# Patient Record
Sex: MALE | Race: WHITE | Employment: OTHER | URBAN - METROPOLITAN AREA
[De-identification: names, ages, dates, MRNs, and addresses within clinical notes are randomized per-mention and may not be internally consistent; named-entity substitution may affect disease eponyms.]

---

## 2019-07-14 ENCOUNTER — HOSPITAL ENCOUNTER (OUTPATIENT)
Dept: MRI IMAGING | Age: 74
Discharge: HOME | End: 2019-07-14
Attending: SPECIALIST
Payer: MEDICARE

## 2019-07-14 DIAGNOSIS — M54.9 BACK PAIN: ICD-10-CM

## 2019-07-14 PROCEDURE — 72148 MRI LUMBAR SPINE W/O DYE: CPT

## 2019-11-04 NOTE — PROGRESS NOTES
Neurology Note    Patient ID:  Claudetta Few  <[id number]>  75 y.o.  1945      Date of Consultation:  November 5, 2019    Referring Physician: Tamra Burns NP    Reason for Consultation:  Leg stiffness    Subjective: my legs are stiff. History of Present Illness:   Claudetta Few is a 75 y.o. male who presents for an evaluation of balance difficulties and leg stiffness. He states that he has had these difficulties for well over a year now. He states he has been losing his flexibility and mobility in his legs. He began to seek additional attention approximately 2 months ago. He did see his primary care doctor and then did have a referral to a neurosurgeon. He did have imaging of his spine which did reportedly show lumbar degeneration. He then did have 2 injections into his spine which provided very little in the way of improvement. Of note, he does have 2 separate charts with in the [facility] system. He is listed under Sulema Buchanan in 1 and GWENDOLYN Mckay in today's note. He was then arranged to receive physical therapy twice a week. He has been there for 2 weeks. No real benefit. He has fallen in the past, about 2 months ago was in last fall. He doesn't use a cane or hiking stick. He does feels that he is slower. Turning makes his balance notably worse. He does have shower bars now. He did have one fall in the shower a few years back. At times, he does feel that his legs are heavy and that his feet do not have good contact with the ground. He states there is a slight bit of numbness but no true pain. He does not notice any tingling either. In regards to his overall medical health, he did break out with a rash in June/July after having a reaction to a medication and sunlight. Now he is getting treatment for skin cancer. This is continuing to improve.     Past Medical History:   Diagnosis Date    Arthritis     Essential hypertension         Past Surgical History:   Procedure Laterality Date    HX PROSTATE SURGERY          Family History   Problem Relation Age of Onset    Seizures Mother     Stroke Mother         Social History     Tobacco Use    Smoking status: Never Smoker    Smokeless tobacco: Never Used   Substance Use Topics    Alcohol use: Yes        Allergies not on file     Prior to Admission medications    Medication Sig Start Date End Date Taking? Authorizing Provider   carvedilol (COREG) 25 mg tablet Take 25 mg by mouth two (2) times daily (with meals). Yes Provider, Historical   omega 3-dha-epa-fish oil (FISH OIL) 100-160-1,000 mg cap Take  by mouth daily. Yes Provider, Historical   multivitamin (ONE A DAY) tablet Take 1 Tab by mouth daily. Yes Provider, Historical     On celebrex to help with arthritis. Review of Systems:    General, constitutional: negative  Eyes, vision: negative  Ears, nose, throat: negative  Cardiovascular, heart: negative  Respiratory: negative  Gastrointestinal: negative  Genitourinary: negative  Musculoskeletal: Back and joint pain  Skin and integumentary: Multiple skin bruises  Psychiatric: negative  Endocrine: negative  Neurological: negative, except for HPI  Hematologic/lymphatic: negative  Allergy/immunology: negative      Objective:     Visit Vitals  /70   Pulse 69   Ht 5' 11\" (1.803 m)   Wt 183 lb (83 kg)   SpO2 98%   BMI 25.52 kg/m²       Physical Exam:      General:  appears well nourished in no acute distress  Neck: no carotid bruits  Lungs: clear to auscultation  Heart:  no murmurs, regular rate  Lower extremity: peripheral pulses palpable and no edema. He does have a notable bruise on his left dorsum of the foot  Skin: intact, ever there is multiple skin lesions    Neurological exam:    Awake, alert, oriented to person, place and time  Recent and remote memory were normal  Attention and concentration were intact  Language was intact.   There was no aphasia  Speech: no dysarthria  Fund of knowledge was preserved    Cranial nerves:   II-XII were tested    Perrrla  Fundoscopic examination revealed venous pulsations and no clear abnormalities  Visual fields were full  Eomi, no evidence of nystagmus  Facial sensation:  normal and symmetric  Facial motor: normal and symmetric  Hearing is diminished  SCM strength intact  Tongue: midline without fasciculations    Motor: Tone normal    No evidence of fasciculations    Strength testing:   deltoid triceps biceps Wrist ext. Wrist flex. intrinsics Hip flex. Hip ext. Knee ext. Knee flex Dorsi flex Plantar flex   Right 5 5 5 5 5 5 5 5 5 5 5 5   Left 5 5 5 5 5 5 5 5 5 5 5 5         Sensory:  Upper extremity: intact to pp, light touch, and vibration > 10 seconds  Lower extremity: His vibration is absent at his toes and present for only 2 seconds at his ankles. It is present for 6 seconds and his knees. His pinprick is decreased to just above his ankles    Reflexes:    Right Left  Biceps  2 2  Triceps 2 2  Brachiorad. 2 2  Patella  3 3  Achilles 1 1    Plantar response:  flexor bilaterally      Cerebellar testing:  no tremor apparent, finger/nose and frank were intact    Romberg: Present    Gait: steady however wide-based. He is unable to tandem gait. He also could not walk on his toes or heels. Labs:     I do not have any laboratory results for my review. Imaging:    I did review his lumbar spine MRI which is listed under [facility]. This was performed in July 2019. There is multilevel degenerative disease that was apparent. I did also review the final interpretation of the radiologist      Assessment and Plan: This is a pleasant 25-year-old gentleman with multiple problems including lumbar degeneration which is impacting his overall neurological health who presents with difficulty with balance and leg stiffness. 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   Interestingly, he does have brisk patellar reflexes which give a suggestion of a possible myeloneuropathy. Balance difficulties: There is clearly some element of a neuropathy, which may be a myeloneuropathy. .  This may be due to combined neurological issues of a neuropathy as well as a  cervical or thoracic cord impingement. I discussed this with the patient today. I would like to obtain serological testing looking for causes of neuropathy. I discussed the rationale with him today and we will obtain his labs  I would like him to obtain an EMG nerve conduction study to help determine whether this is demyelinating versus axonal.  He should continue with physical therapy. I do think he should get a hiking stick to help with his balance as I do fear for falls with him. Neuropathy:  we reviewed the causes contributing to the neuropathy. We discussed the importance of exercise and activity. I also reviewed the importance of safety with ambulation and ways to prevents falls. I did provide him with educational information about peripheral neuropathy. 2. Lumbar spine disease:    He should continue with his stretches and exercises as well as physical therapy. He will return to clinic in 1 month's time to review all of the serological testing as well as the EMG nerve conduction study results.                Signed By:  Jane Barron DO FAAN    November 5, 2019

## 2019-11-05 ENCOUNTER — OFFICE VISIT (OUTPATIENT)
Dept: NEUROLOGY | Age: 74
End: 2019-11-05

## 2019-11-05 VITALS
HEART RATE: 69 BPM | DIASTOLIC BLOOD PRESSURE: 70 MMHG | BODY MASS INDEX: 25.62 KG/M2 | WEIGHT: 183 LBS | OXYGEN SATURATION: 98 % | HEIGHT: 71 IN | SYSTOLIC BLOOD PRESSURE: 140 MMHG

## 2019-11-05 DIAGNOSIS — M62.9 DISORDER OF MUSCLE, UNSPECIFIED: ICD-10-CM

## 2019-11-05 DIAGNOSIS — R73.9 HYPERGLYCEMIA, UNSPECIFIED: ICD-10-CM

## 2019-11-05 DIAGNOSIS — R68.89 OTHER GENERAL SYMPTOMS AND SIGNS: ICD-10-CM

## 2019-11-05 DIAGNOSIS — G62.9 NEUROPATHY: Primary | ICD-10-CM

## 2019-11-05 RX ORDER — CARVEDILOL 25 MG/1
25 TABLET ORAL 2 TIMES DAILY WITH MEALS
COMMUNITY
End: 2020-01-07 | Stop reason: SDUPTHER

## 2019-11-05 RX ORDER — BISMUTH SUBSALICYLATE 262 MG
1 TABLET,CHEWABLE ORAL DAILY
COMMUNITY
End: 2020-01-07 | Stop reason: SDUPTHER

## 2019-11-05 NOTE — PATIENT INSTRUCTIONS

## 2019-11-06 LAB
ALBUMIN SERPL ELPH-MCNC: NORMAL G/DL
ALBUMIN SERPL ELPH-MCNC: NORMAL G/DL
ALBUMIN SERPL-MCNC: 4.1 G/DL (ref 3.5–4.8)
ALBUMIN SERPL-MCNC: 4.1 G/DL (ref 3.5–4.8)
ALBUMIN/GLOB SERPL: 1.6 {RATIO} (ref 1.2–2.2)
ALBUMIN/GLOB SERPL: 1.6 {RATIO} (ref 1.2–2.2)
ALBUMIN/GLOB SERPL: NORMAL {RATIO}
ALBUMIN/GLOB SERPL: NORMAL {RATIO}
ALP SERPL-CCNC: 66 IU/L (ref 39–117)
ALP SERPL-CCNC: 66 IU/L (ref 39–117)
ALPHA1 GLOB SERPL ELPH-MCNC: NORMAL G/DL
ALPHA1 GLOB SERPL ELPH-MCNC: NORMAL G/DL
ALPHA2 GLOB SERPL ELPH-MCNC: NORMAL G/DL
ALPHA2 GLOB SERPL ELPH-MCNC: NORMAL G/DL
ALT SERPL-CCNC: 15 IU/L (ref 0–44)
ALT SERPL-CCNC: 15 IU/L (ref 0–44)
AST SERPL-CCNC: 25 IU/L (ref 0–40)
AST SERPL-CCNC: 25 IU/L (ref 0–40)
B-GLOBULIN SERPL ELPH-MCNC: NORMAL G/DL
B-GLOBULIN SERPL ELPH-MCNC: NORMAL G/DL
BASOPHILS # BLD AUTO: 0.1 X10E3/UL (ref 0–0.2)
BASOPHILS # BLD AUTO: NORMAL 10*3/UL
BASOPHILS NFR BLD AUTO: 1 %
BASOPHILS NFR BLD AUTO: NORMAL %
BILIRUB SERPL-MCNC: 0.4 MG/DL (ref 0–1.2)
BILIRUB SERPL-MCNC: 0.4 MG/DL (ref 0–1.2)
BUN SERPL-MCNC: 12 MG/DL (ref 8–27)
BUN SERPL-MCNC: 12 MG/DL (ref 8–27)
BUN/CREAT SERPL: 12 (ref 10–24)
BUN/CREAT SERPL: 12 (ref 10–24)
CALCIUM SERPL-MCNC: 9.1 MG/DL (ref 8.6–10.2)
CALCIUM SERPL-MCNC: 9.1 MG/DL (ref 8.6–10.2)
CENTROMERE B AB SER-ACNC: <0.2 AI (ref 0–0.9)
CENTROMERE B AB SER-ACNC: NORMAL
CHLORIDE SERPL-SCNC: 97 MMOL/L (ref 96–106)
CHLORIDE SERPL-SCNC: 97 MMOL/L (ref 96–106)
CHROMATIN AB SERPL-ACNC: <0.2 AI (ref 0–0.9)
CHROMATIN AB SERPL-ACNC: NORMAL
CO2 SERPL-SCNC: 21 MMOL/L (ref 20–29)
CO2 SERPL-SCNC: 21 MMOL/L (ref 20–29)
CREAT SERPL-MCNC: 0.98 MG/DL (ref 0.76–1.27)
CREAT SERPL-MCNC: 0.98 MG/DL (ref 0.76–1.27)
CRP SERPL HS-MCNC: 4.56 MG/L (ref 0–3)
CRP SERPL HS-MCNC: 4.56 MG/L (ref 0–3)
DSDNA AB SER-ACNC: <1 IU/ML (ref 0–9)
DSDNA AB SER-ACNC: NORMAL [IU]/ML
ENA JO1 AB SER-ACNC: <0.2 AI (ref 0–0.9)
ENA JO1 AB SER-ACNC: NORMAL
ENA RNP AB SER-ACNC: <0.2 AI (ref 0–0.9)
ENA RNP AB SER-ACNC: NORMAL
ENA SCL70 AB SER-ACNC: <0.2 AI (ref 0–0.9)
ENA SCL70 AB SER-ACNC: NORMAL
ENA SM AB SER-ACNC: <0.2 AI (ref 0–0.9)
ENA SM AB SER-ACNC: NORMAL
ENA SM+RNP AB SER-ACNC: <0.2 AI (ref 0–0.9)
ENA SM+RNP AB SER-ACNC: NORMAL
ENA SS-A AB SER-ACNC: <0.2 AI (ref 0–0.9)
ENA SS-A AB SER-ACNC: NORMAL
ENA SS-B AB SER-ACNC: <0.2 AI (ref 0–0.9)
ENA SS-B AB SER-ACNC: NORMAL
EOSINOPHIL # BLD AUTO: 0.6 X10E3/UL (ref 0–0.4)
EOSINOPHIL # BLD AUTO: NORMAL 10*3/UL
EOSINOPHIL NFR BLD AUTO: 7 %
EOSINOPHIL NFR BLD AUTO: NORMAL %
ERYTHROCYTE [DISTWIDTH] IN BLOOD BY AUTOMATED COUNT: 15.1 % (ref 12.3–15.4)
ERYTHROCYTE [DISTWIDTH] IN BLOOD BY AUTOMATED COUNT: NORMAL %
GAMMA GLOB SERPL ELPH-MCNC: NORMAL G/DL
GAMMA GLOB SERPL ELPH-MCNC: NORMAL G/DL
GLOBULIN SER CALC-MCNC: 2.6 G/DL (ref 1.5–4.5)
GLOBULIN SER CALC-MCNC: 2.6 G/DL (ref 1.5–4.5)
GLOBULIN SER-MCNC: NORMAL G/DL
GLOBULIN SER-MCNC: NORMAL G/DL
GLUCOSE SERPL-MCNC: 92 MG/DL (ref 65–99)
GLUCOSE SERPL-MCNC: 92 MG/DL (ref 65–99)
HBA1C MFR BLD: 5.4 % (ref 4.8–5.6)
HBA1C MFR BLD: NORMAL %
HCT VFR BLD AUTO: 32.6 % (ref 37.5–51)
HCT VFR BLD AUTO: NORMAL %
HGB BLD-MCNC: 11.2 G/DL (ref 13–17.7)
HGB BLD-MCNC: NORMAL G/DL
IGA SERPL-MCNC: NORMAL MG/DL
IGA SERPL-MCNC: NORMAL MG/DL
IGG SERPL-MCNC: NORMAL MG/DL
IGG SERPL-MCNC: NORMAL MG/DL
IGM SERPL-MCNC: NORMAL MG/DL
IGM SERPL-MCNC: NORMAL MG/DL
IMM GRANULOCYTES # BLD AUTO: 0.3 X10E3/UL (ref 0–0.1)
IMM GRANULOCYTES # BLD AUTO: NORMAL 10*3/UL
IMM GRANULOCYTES NFR BLD AUTO: 3 %
IMM GRANULOCYTES NFR BLD AUTO: NORMAL %
IMMATURE CELLS, 115398: NORMAL
INTERPRETATION SERPL IEP-IMP: NORMAL
INTERPRETATION SERPL IEP-IMP: NORMAL
LABORATORY REPORT: NORMAL
LABORATORY REPORT: NORMAL
LYMPHOCYTES # BLD AUTO: 1.8 X10E3/UL (ref 0.7–3.1)
LYMPHOCYTES # BLD AUTO: NORMAL 10*3/UL
LYMPHOCYTES NFR BLD AUTO: 21 %
LYMPHOCYTES NFR BLD AUTO: NORMAL %
Lab: NORMAL
Lab: NORMAL
M PROTEIN SERPL ELPH-MCNC: NORMAL G/DL
M PROTEIN SERPL ELPH-MCNC: NORMAL G/DL
MCH RBC QN AUTO: 33.2 PG (ref 26.6–33)
MCH RBC QN AUTO: NORMAL PG
MCHC RBC AUTO-ENTMCNC: 34.4 G/DL (ref 31.5–35.7)
MCHC RBC AUTO-ENTMCNC: NORMAL G/DL
MCV RBC AUTO: 97 FL (ref 79–97)
MCV RBC AUTO: NORMAL FL
METHYLMALONATE SERPL-SCNC: NORMAL
METHYLMALONATE SERPL-SCNC: NORMAL
MONOCYTES # BLD AUTO: 1.2 X10E3/UL (ref 0.1–0.9)
MONOCYTES # BLD AUTO: NORMAL 10*3/UL
MONOCYTES NFR BLD AUTO: 13 %
MONOCYTES NFR BLD AUTO: NORMAL %
MORPHOLOGY BLD-IMP: NORMAL
NEUTROPHILS # BLD AUTO: 5 X10E3/UL (ref 1.4–7)
NEUTROPHILS # BLD AUTO: NORMAL 10*3/UL
NEUTROPHILS NFR BLD AUTO: 55 %
NEUTROPHILS NFR BLD AUTO: NORMAL %
NRBC BLD AUTO-RTO: NORMAL %
PLATELET # BLD AUTO: 161 X10E3/UL (ref 150–450)
PLATELET # BLD AUTO: NORMAL 10*3/UL
PLEASE NOTE:, 149534: NORMAL
PLEASE NOTE:, 149534: NORMAL
POTASSIUM SERPL-SCNC: 4.4 MMOL/L (ref 3.5–5.2)
POTASSIUM SERPL-SCNC: 4.4 MMOL/L (ref 3.5–5.2)
PROT SERPL-MCNC: 6.7 G/DL (ref 6–8.5)
PROT SERPL-MCNC: 6.7 G/DL (ref 6–8.5)
RBC # BLD AUTO: 3.37 X10E6/UL (ref 4.14–5.8)
RBC # BLD AUTO: NORMAL 10*6/UL
RIBOSOMAL P AB SER-ACNC: <0.2 AI (ref 0–0.9)
RIBOSOMAL P AB SER-ACNC: NORMAL
SEE BELOW:, 164879: NORMAL
SEE BELOW:, 164879: NORMAL
SODIUM SERPL-SCNC: 131 MMOL/L (ref 134–144)
SODIUM SERPL-SCNC: 131 MMOL/L (ref 134–144)
T4 FREE SERPL-MCNC: 0.85 NG/DL (ref 0.82–1.77)
T4 FREE SERPL-MCNC: 0.85 NG/DL (ref 0.82–1.77)
TSH SERPL DL<=0.005 MIU/L-ACNC: 5.42 UIU/ML (ref 0.45–4.5)
TSH SERPL DL<=0.005 MIU/L-ACNC: 5.42 UIU/ML (ref 0.45–4.5)
VIT B12 SERPL-MCNC: 877 PG/ML (ref 232–1245)
VIT B12 SERPL-MCNC: 877 PG/ML (ref 232–1245)
WBC # BLD AUTO: 8.9 X10E3/UL (ref 3.4–10.8)
WBC # BLD AUTO: NORMAL 10*3/UL

## 2019-11-08 ENCOUNTER — OFFICE VISIT (OUTPATIENT)
Dept: NEUROLOGY | Age: 74
End: 2019-11-08

## 2019-11-08 VITALS
WEIGHT: 183 LBS | BODY MASS INDEX: 25.62 KG/M2 | HEART RATE: 68 BPM | SYSTOLIC BLOOD PRESSURE: 145 MMHG | HEIGHT: 71 IN | OXYGEN SATURATION: 98 % | DIASTOLIC BLOOD PRESSURE: 62 MMHG

## 2019-11-08 DIAGNOSIS — G60.9 IDIOPATHIC PERIPHERAL NEUROPATHY: ICD-10-CM

## 2019-11-08 LAB
ALBUMIN SERPL ELPH-MCNC: NORMAL G/DL
ALBUMIN SERPL-MCNC: 4.1 G/DL (ref 3.5–4.8)
ALBUMIN/GLOB SERPL: 1.6 {RATIO} (ref 1.2–2.2)
ALBUMIN/GLOB SERPL: NORMAL {RATIO}
ALP SERPL-CCNC: 66 IU/L (ref 39–117)
ALPHA1 GLOB SERPL ELPH-MCNC: NORMAL G/DL
ALPHA2 GLOB SERPL ELPH-MCNC: NORMAL G/DL
ALT SERPL-CCNC: 15 IU/L (ref 0–44)
AST SERPL-CCNC: 25 IU/L (ref 0–40)
B-GLOBULIN SERPL ELPH-MCNC: NORMAL G/DL
BASOPHILS # BLD AUTO: 0.1 X10E3/UL (ref 0–0.2)
BASOPHILS NFR BLD AUTO: 1 %
BILIRUB SERPL-MCNC: 0.4 MG/DL (ref 0–1.2)
BUN SERPL-MCNC: 12 MG/DL (ref 8–27)
BUN/CREAT SERPL: 12 (ref 10–24)
CALCIUM SERPL-MCNC: 9.1 MG/DL (ref 8.6–10.2)
CENTROMERE B AB SER-ACNC: <0.2 AI (ref 0–0.9)
CHLORIDE SERPL-SCNC: 97 MMOL/L (ref 96–106)
CHROMATIN AB SERPL-ACNC: <0.2 AI (ref 0–0.9)
CO2 SERPL-SCNC: 21 MMOL/L (ref 20–29)
CREAT SERPL-MCNC: 0.98 MG/DL (ref 0.76–1.27)
CRP SERPL HS-MCNC: 4.56 MG/L (ref 0–3)
DSDNA AB SER-ACNC: <1 IU/ML (ref 0–9)
ENA JO1 AB SER-ACNC: <0.2 AI (ref 0–0.9)
ENA RNP AB SER-ACNC: <0.2 AI (ref 0–0.9)
ENA SCL70 AB SER-ACNC: <0.2 AI (ref 0–0.9)
ENA SM AB SER-ACNC: <0.2 AI (ref 0–0.9)
ENA SM+RNP AB SER-ACNC: <0.2 AI (ref 0–0.9)
ENA SS-A AB SER-ACNC: <0.2 AI (ref 0–0.9)
ENA SS-B AB SER-ACNC: <0.2 AI (ref 0–0.9)
EOSINOPHIL # BLD AUTO: 0.6 X10E3/UL (ref 0–0.4)
EOSINOPHIL NFR BLD AUTO: 7 %
ERYTHROCYTE [DISTWIDTH] IN BLOOD BY AUTOMATED COUNT: 15.1 % (ref 12.3–15.4)
GAMMA GLOB SERPL ELPH-MCNC: NORMAL G/DL
GLOBULIN SER CALC-MCNC: 2.6 G/DL (ref 1.5–4.5)
GLOBULIN SER-MCNC: NORMAL G/DL
GLUCOSE SERPL-MCNC: 92 MG/DL (ref 65–99)
HBA1C MFR BLD: 5.4 % (ref 4.8–5.6)
HCT VFR BLD AUTO: 32.6 % (ref 37.5–51)
HGB BLD-MCNC: 11.2 G/DL (ref 13–17.7)
IGA SERPL-MCNC: NORMAL MG/DL
IGG SERPL-MCNC: NORMAL MG/DL
IGM SERPL-MCNC: NORMAL MG/DL
IMM GRANULOCYTES # BLD AUTO: 0.3 X10E3/UL (ref 0–0.1)
IMM GRANULOCYTES NFR BLD AUTO: 3 %
INTERPRETATION SERPL IEP-IMP: NORMAL
LABORATORY REPORT: NORMAL
LYMPHOCYTES # BLD AUTO: 1.8 X10E3/UL (ref 0.7–3.1)
LYMPHOCYTES NFR BLD AUTO: 21 %
Lab: NORMAL
M PROTEIN SERPL ELPH-MCNC: NORMAL G/DL
MCH RBC QN AUTO: 33.2 PG (ref 26.6–33)
MCHC RBC AUTO-ENTMCNC: 34.4 G/DL (ref 31.5–35.7)
MCV RBC AUTO: 97 FL (ref 79–97)
METHYLMALONATE SERPL-SCNC: 205 NMOL/L (ref 0–378)
MONOCYTES # BLD AUTO: 1.2 X10E3/UL (ref 0.1–0.9)
MONOCYTES NFR BLD AUTO: 13 %
NEUTROPHILS # BLD AUTO: 5 X10E3/UL (ref 1.4–7)
NEUTROPHILS NFR BLD AUTO: 55 %
PLATELET # BLD AUTO: 161 X10E3/UL (ref 150–450)
PLEASE NOTE:, 149534: NORMAL
POTASSIUM SERPL-SCNC: 4.4 MMOL/L (ref 3.5–5.2)
PROT SERPL-MCNC: 6.7 G/DL (ref 6–8.5)
RBC # BLD AUTO: 3.37 X10E6/UL (ref 4.14–5.8)
RIBOSOMAL P AB SER-ACNC: <0.2 AI (ref 0–0.9)
SEE BELOW:, 164879: NORMAL
SODIUM SERPL-SCNC: 131 MMOL/L (ref 134–144)
T4 FREE SERPL-MCNC: 0.85 NG/DL (ref 0.82–1.77)
TSH SERPL DL<=0.005 MIU/L-ACNC: 5.42 UIU/ML (ref 0.45–4.5)
VIT B12 SERPL-MCNC: 877 PG/ML (ref 232–1245)
WBC # BLD AUTO: 8.9 X10E3/UL (ref 3.4–10.8)

## 2019-11-08 NOTE — PROCEDURES
ELECTRODIAGNOSTIC REPORT      Test Date:  2019    Patient: Marie Torres : 1945 Physician: Dr. Kojo Presley D.O.   ID#: [id number] SEX: Male Ref. Phys:      Patient History / Exam:  Please see the neuromuscular consult    EMG & NCV Finding    Nerve conduction studies as listed below were absent for the bilateral superficial peroneal sensory and sural sensory. The right median sensory, radial sensory were normal.  The left peroneal motor was normal.  The right peroneal motor showed only a very mild reduction in conduction velocity with low normal amplitude. The right median motor and ulnar motor studies were normal.  The bilateral tibial motor responses were low normal.    Disposable concentric needle examination of the left lower extremity was essentially normal.    Impression: This study was abnormal.  There is electrodiagnostic evidence upon today's examination suggestin. A sensory greater than motor length dependent bilateral peripheral neuropathy. 2.  There was no evidence of a lower extremity radiculopathy. _____________  Cheryl VILLALPANDO FAAN    Nerve Conduction Studies  Anti Sensory Summary Table     Stim Site NR Onset (ms) Peak (ms) O-P Amp (µV) Norm Peak (ms) Norm O-P Amp Site1 Site2 Dist (cm) Norm Suman (m/s)   Right Median Anti Sensory (2nd Digit)  31.9°C   Wrist    2.9 3.9 22.3 <4 >11 Wrist 2nd Digit 14.0    Right Radial Anti Sensory (Base 1st Digit)  32.2°C   Wrist    2.0 2.4 31.2 <2.8 7 Wrist Base 1st Digit 10.0    Left Sup Fibular Anti Sensory (Lat ankle)  32.3°C   Lower leg NR    <4.4 >5.0 Lower leg Lat ankle 10.0 >32   Right Sup Fibular Anti Sensory (Lat ankle)  31.1°C   Lower leg NR    <4.4 >5.0 Lower leg Lat ankle 10.0 >32   Left Sural Anti Sensory (Lat Mall)  32.3°C   Calf NR    <4.5 >4.0 Calf Lat Mall 14.0    Right Sural Anti Sensory (Lat Mall)  30.6°C   Calf NR    <4.5 >4.0 Calf Lat Mall 14.0    Right Ulnar Anti Sensory (5th Digit)  32.1°C   Wrist    3.3 4.0 22.4 <4.0 >10 Wrist 5th Digit 14.0      Motor Summary Table     Stim Site NR Onset (ms) Norm Onset (ms) O-P Amp (mV) Norm O-P Amp P-T Amp (mV) Site1 Site2 Dist (cm) Suman (m/s)   Left Fibular Motor (Ext Dig Brev)  32.3°C   Ankle    5.2 <6.5 3.0 >1.1  Ankle Ext Dig Brev 8.0 15   B Fib    13.1  1.9   B Fib Ankle 32.0 41   Poplt    15.2  1.9   Poplt B Fib 10.0 48   Right Fibular Motor (Ext Dig Brev)   Ankle    4.7 <6.5 2.9 >1.1  Ankle Ext Dig Brev 8.0 17   B Fib    13.8  1.5   B Fib Ankle 33.0 36   Poplt    15.9  1.4   Poplt B Fib 11.0 52   Right Median Motor (Abd Poll Brev)  32.3°C   Wrist    3.6 <4.5 8.1 >4.1  Wrist Abd Poll Brev 8.0 22   Elbow    8.4  7.8   Elbow Wrist 27.0 56   Left Tibial Motor (Abd Rehman Brev)  32.1°C   Ankle    4.8 <6.1 2.7 >1.1  Ankle Abd Rehman Brev 8.0 17   Knee    15.0  2.1   Knee Ankle 40.0 39   Right Tibial Motor (Abd Rehman Brev)  31.3°C   Ankle    4.5 <6.1 3.0 >1.1  Ankle Abd Rehman Brev 8.0 18   Knee    15.1  2.3   Knee Ankle 41.0 39   Right Ulnar Motor (Abd Dig Minimi)  32.4°C   Wrist    3.1 <3.1 10.9 >7.0  Wrist Abd Dig Minimi 8.0 26   B Elbow    7.8  8.7   B Elbow Wrist 24.0 51   A Elbow    9.8  8.5   A Elbow B Elbow 10.0 50       EMG     Side Muscle Nerve Root Ins Act Fibs Psw Recrt Duration Amp Poly Comment                 Left AntTibialis Dp Br Peron L4-5 Nml Nml Nml Nml Nml Nml Nml    Left MedGastroc Tibial S1-2 Incr Nml Nml Nml Nml Nml Nml    Left Peroneus Long   Nml Nml Nml Nml Nml Nml Nml    Left VastusLat Femoral L2-4 Nml Nml Nml Nml Nml Nml Nml    Left Tensor Fascia Katherine Sciatic L5 Nml Nml Nml Nml Nml Nml Nml    Left GluteusMed SupGluteal L4-S1 Nml Nml Nml Nml Nml Nml Nml      Waveforms:

## 2019-11-11 LAB
ALBUMIN SERPL ELPH-MCNC: 3.9 G/DL (ref 2.9–4.4)
ALBUMIN SERPL-MCNC: 4.1 G/DL (ref 3.5–4.8)
ALBUMIN/GLOB SERPL: 1.4 {RATIO} (ref 0.7–1.7)
ALBUMIN/GLOB SERPL: 1.6 {RATIO} (ref 1.2–2.2)
ALP SERPL-CCNC: 66 IU/L (ref 39–117)
ALPHA1 GLOB SERPL ELPH-MCNC: 0.2 G/DL (ref 0–0.4)
ALPHA2 GLOB SERPL ELPH-MCNC: 0.5 G/DL (ref 0.4–1)
ALT SERPL-CCNC: 15 IU/L (ref 0–44)
AST SERPL-CCNC: 25 IU/L (ref 0–40)
B-GLOBULIN SERPL ELPH-MCNC: 0.8 G/DL (ref 0.7–1.3)
BASOPHILS # BLD AUTO: 0.1 X10E3/UL (ref 0–0.2)
BASOPHILS NFR BLD AUTO: 1 %
BILIRUB SERPL-MCNC: 0.4 MG/DL (ref 0–1.2)
BUN SERPL-MCNC: 12 MG/DL (ref 8–27)
BUN/CREAT SERPL: 12 (ref 10–24)
CALCIUM SERPL-MCNC: 9.1 MG/DL (ref 8.6–10.2)
CENTROMERE B AB SER-ACNC: <0.2 AI (ref 0–0.9)
CHLORIDE SERPL-SCNC: 97 MMOL/L (ref 96–106)
CHROMATIN AB SERPL-ACNC: <0.2 AI (ref 0–0.9)
CO2 SERPL-SCNC: 21 MMOL/L (ref 20–29)
CREAT SERPL-MCNC: 0.98 MG/DL (ref 0.76–1.27)
CRP SERPL HS-MCNC: 4.56 MG/L (ref 0–3)
DSDNA AB SER-ACNC: <1 IU/ML (ref 0–9)
ENA JO1 AB SER-ACNC: <0.2 AI (ref 0–0.9)
ENA RNP AB SER-ACNC: <0.2 AI (ref 0–0.9)
ENA SCL70 AB SER-ACNC: <0.2 AI (ref 0–0.9)
ENA SM AB SER-ACNC: <0.2 AI (ref 0–0.9)
ENA SM+RNP AB SER-ACNC: <0.2 AI (ref 0–0.9)
ENA SS-A AB SER-ACNC: <0.2 AI (ref 0–0.9)
ENA SS-B AB SER-ACNC: <0.2 AI (ref 0–0.9)
EOSINOPHIL # BLD AUTO: 0.6 X10E3/UL (ref 0–0.4)
EOSINOPHIL NFR BLD AUTO: 7 %
ERYTHROCYTE [DISTWIDTH] IN BLOOD BY AUTOMATED COUNT: 15.1 % (ref 12.3–15.4)
GAMMA GLOB SERPL ELPH-MCNC: 1.2 G/DL (ref 0.4–1.8)
GLOBULIN SER CALC-MCNC: 2.6 G/DL (ref 1.5–4.5)
GLOBULIN SER-MCNC: 2.8 G/DL (ref 2.2–3.9)
GLUCOSE SERPL-MCNC: 92 MG/DL (ref 65–99)
HBA1C MFR BLD: 5.4 % (ref 4.8–5.6)
HCT VFR BLD AUTO: 32.6 % (ref 37.5–51)
HGB BLD-MCNC: 11.2 G/DL (ref 13–17.7)
IGA SERPL-MCNC: 133 MG/DL (ref 61–437)
IGG SERPL-MCNC: 1322 MG/DL (ref 700–1600)
IGM SERPL-MCNC: 76 MG/DL (ref 15–143)
IMM GRANULOCYTES # BLD AUTO: 0.3 X10E3/UL (ref 0–0.1)
IMM GRANULOCYTES NFR BLD AUTO: 3 %
INTERPRETATION SERPL IEP-IMP: NORMAL
LYMPHOCYTES # BLD AUTO: 1.8 X10E3/UL (ref 0.7–3.1)
LYMPHOCYTES NFR BLD AUTO: 21 %
Lab: NORMAL
M PROTEIN SERPL ELPH-MCNC: NORMAL G/DL
MCH RBC QN AUTO: 33.2 PG (ref 26.6–33)
MCHC RBC AUTO-ENTMCNC: 34.4 G/DL (ref 31.5–35.7)
MCV RBC AUTO: 97 FL (ref 79–97)
METHYLMALONATE SERPL-SCNC: 205 NMOL/L (ref 0–378)
MONOCYTES # BLD AUTO: 1.2 X10E3/UL (ref 0.1–0.9)
MONOCYTES NFR BLD AUTO: 13 %
NEUTROPHILS # BLD AUTO: 5 X10E3/UL (ref 1.4–7)
NEUTROPHILS NFR BLD AUTO: 55 %
PLATELET # BLD AUTO: 161 X10E3/UL (ref 150–450)
PLEASE NOTE:, 149534: NORMAL
POTASSIUM SERPL-SCNC: 4.4 MMOL/L (ref 3.5–5.2)
PROT SERPL-MCNC: 6.7 G/DL (ref 6–8.5)
RBC # BLD AUTO: 3.37 X10E6/UL (ref 4.14–5.8)
RIBOSOMAL P AB SER-ACNC: <0.2 AI (ref 0–0.9)
SEE BELOW:, 164879: NORMAL
SODIUM SERPL-SCNC: 131 MMOL/L (ref 134–144)
T4 FREE SERPL-MCNC: 0.85 NG/DL (ref 0.82–1.77)
TSH SERPL DL<=0.005 MIU/L-ACNC: 5.42 UIU/ML (ref 0.45–4.5)
VIT B12 SERPL-MCNC: 877 PG/ML (ref 232–1245)
WBC # BLD AUTO: 8.9 X10E3/UL (ref 3.4–10.8)

## 2019-11-15 ENCOUNTER — TELEPHONE (OUTPATIENT)
Dept: NEUROLOGY | Age: 74
End: 2019-11-15

## 2019-11-18 NOTE — TELEPHONE ENCOUNTER
Hi Mr Tayler Roman,    There was no significant lab abnormalities to explain your neuropathy. Your inflammation marker was slightly elevated, but did not determine a specific cause.     Sincerely,  Dr. Halle Woods

## 2019-12-24 ENCOUNTER — HOSPITAL ENCOUNTER (EMERGENCY)
Age: 74
Discharge: HOME | End: 2019-12-24
Attending: EMERGENCY MEDICINE
Payer: MEDICARE

## 2019-12-24 ENCOUNTER — APPOINTMENT (OUTPATIENT)
Dept: GENERAL RADIOLOGY | Age: 74
End: 2019-12-24
Attending: EMERGENCY MEDICINE
Payer: MEDICARE

## 2019-12-24 VITALS
RESPIRATION RATE: 11 BRPM | OXYGEN SATURATION: 98 % | HEART RATE: 68 BPM | DIASTOLIC BLOOD PRESSURE: 57 MMHG | WEIGHT: 180 LBS | HEIGHT: 71 IN | SYSTOLIC BLOOD PRESSURE: 136 MMHG | TEMPERATURE: 98.4 F | BODY MASS INDEX: 25.2 KG/M2

## 2019-12-24 DIAGNOSIS — R55 SYNCOPE AND COLLAPSE: Primary | ICD-10-CM

## 2019-12-24 LAB
ALBUMIN SERPL-MCNC: 3.3 G/DL (ref 3.5–5)
ALBUMIN/GLOB SERPL: 0.9 {RATIO} (ref 1.1–2.2)
ALP SERPL-CCNC: 98 U/L (ref 45–117)
ALT SERPL-CCNC: 46 U/L (ref 12–78)
ANION GAP SERPL CALC-SCNC: 6 MMOL/L (ref 5–15)
AST SERPL-CCNC: 45 U/L (ref 15–37)
BASOPHILS # BLD: 0.1 K/UL (ref 0–0.1)
BASOPHILS NFR BLD: 1 % (ref 0–1)
BILIRUB SERPL-MCNC: 0.5 MG/DL (ref 0.2–1)
BUN SERPL-MCNC: 13 MG/DL (ref 6–20)
BUN/CREAT SERPL: 14 (ref 12–20)
CALCIUM SERPL-MCNC: 8.1 MG/DL (ref 8.5–10.1)
CHLORIDE SERPL-SCNC: 102 MMOL/L (ref 97–108)
CO2 SERPL-SCNC: 25 MMOL/L (ref 21–32)
CREAT SERPL-MCNC: 0.93 MG/DL (ref 0.7–1.3)
DIFFERENTIAL METHOD BLD: ABNORMAL
EOSINOPHIL # BLD: 0.5 K/UL (ref 0–0.4)
EOSINOPHIL NFR BLD: 5 % (ref 0–7)
ERYTHROCYTE [DISTWIDTH] IN BLOOD BY AUTOMATED COUNT: 15.9 % (ref 11.5–14.5)
GLOBULIN SER CALC-MCNC: 3.7 G/DL (ref 2–4)
GLUCOSE SERPL-MCNC: 102 MG/DL (ref 65–100)
HCT VFR BLD AUTO: 32.7 % (ref 36.6–50.3)
HGB BLD-MCNC: 11.2 G/DL (ref 12.1–17)
IMM GRANULOCYTES # BLD AUTO: 0.1 K/UL (ref 0–0.04)
IMM GRANULOCYTES NFR BLD AUTO: 1 % (ref 0–0.5)
LYMPHOCYTES # BLD: 1.6 K/UL (ref 0.8–3.5)
LYMPHOCYTES NFR BLD: 15 % (ref 12–49)
MCH RBC QN AUTO: 33.1 PG (ref 26–34)
MCHC RBC AUTO-ENTMCNC: 34.3 G/DL (ref 30–36.5)
MCV RBC AUTO: 96.7 FL (ref 80–99)
MONOCYTES # BLD: 1.2 K/UL (ref 0–1)
MONOCYTES NFR BLD: 11 % (ref 5–13)
NEUTS SEG # BLD: 7.3 K/UL (ref 1.8–8)
NEUTS SEG NFR BLD: 67 % (ref 32–75)
NRBC # BLD: 0 K/UL (ref 0–0.01)
NRBC BLD-RTO: 0 PER 100 WBC
PLATELET # BLD AUTO: 123 K/UL (ref 150–400)
PMV BLD AUTO: 9.3 FL (ref 8.9–12.9)
POTASSIUM SERPL-SCNC: 4.1 MMOL/L (ref 3.5–5.1)
PROT SERPL-MCNC: 7 G/DL (ref 6.4–8.2)
RBC # BLD AUTO: 3.38 M/UL (ref 4.1–5.7)
SODIUM SERPL-SCNC: 133 MMOL/L (ref 136–145)
TROPONIN I SERPL-MCNC: <0.05 NG/ML
WBC # BLD AUTO: 10.7 K/UL (ref 4.1–11.1)

## 2019-12-24 PROCEDURE — 71045 X-RAY EXAM CHEST 1 VIEW: CPT

## 2019-12-24 PROCEDURE — 99285 EMERGENCY DEPT VISIT HI MDM: CPT

## 2019-12-24 PROCEDURE — 74011250636 HC RX REV CODE- 250/636: Performed by: EMERGENCY MEDICINE

## 2019-12-24 PROCEDURE — 84484 ASSAY OF TROPONIN QUANT: CPT

## 2019-12-24 PROCEDURE — 93005 ELECTROCARDIOGRAM TRACING: CPT

## 2019-12-24 PROCEDURE — 80053 COMPREHEN METABOLIC PANEL: CPT

## 2019-12-24 PROCEDURE — 36415 COLL VENOUS BLD VENIPUNCTURE: CPT

## 2019-12-24 PROCEDURE — 85025 COMPLETE CBC W/AUTO DIFF WBC: CPT

## 2019-12-24 RX ADMIN — SODIUM CHLORIDE 500 ML: 900 INJECTION, SOLUTION INTRAVENOUS at 13:05

## 2019-12-24 NOTE — ED TRIAGE NOTES
Pt was at home at table with friend when per EMS friend stated pt went unresponsive and friend assisted pt to floor. Pt did not hit head or fall. Per friend pt was out for about a minute and then came back around but was lethargic. BS per .  Pt AOx4   Pt states last night he had a sore throat and nasal congestion and took an [place] to help him sleep

## 2019-12-24 NOTE — DISCHARGE INSTRUCTIONS
Patient Education        Fainting: Care Instructions  Your Care Instructions    When you faint, or pass out, you lose consciousness for a short time. A brief drop in blood flow to the brain often causes it. When you fall or lie down, more blood flows to your brain and you regain consciousness. Emotional stress, pain, or overheating--especially if you have been standing--can make you faint. In these cases, fainting is usually not serious. But fainting can be a sign of a more serious problem. Your doctor may want you to have more tests to rule out other causes. The treatment you need depends on the reason why you fainted. The doctor has checked you carefully, but problems can develop later. If you notice any problems or new symptoms, get medical treatment right away. Follow-up care is a key part of your treatment and safety. Be sure to make and go to all appointments, and call your doctor if you are having problems. It's also a good idea to know your test results and keep a list of the medicines you take. How can you care for yourself at home? · Drink plenty of fluids to prevent dehydration. If you have kidney, heart, or liver disease and have to limit fluids, talk with your doctor before you increase your fluid intake. When should you call for help? Call 911 anytime you think you may need emergency care. For example, call if:    · You have symptoms of a heart problem. These may include:  ? Chest pain or pressure. ? Severe trouble breathing. ? A fast or irregular heartbeat. ? Lightheadedness or sudden weakness. ? Coughing up pink, foamy mucus. ? Passing out. After you call 911, the  may tell you to chew 1 adult-strength or 2 to 4 low-dose aspirin. Wait for an ambulance. Do not try to drive yourself.     · You have symptoms of a stroke. These may include:  ? Sudden numbness, tingling, weakness, or loss of movement in your face, arm, or leg, especially on only one side of your body.   ? Sudden vision changes. ? Sudden trouble speaking. ? Sudden confusion or trouble understanding simple statements. ? Sudden problems with walking or balance. ? A sudden, severe headache that is different from past headaches.     · You passed out (lost consciousness) again.    Watch closely for changes in your health, and be sure to contact your doctor if:    · You do not get better as expected. Where can you learn more? Go to http://roxane-jennifer.info/. Enter D989 in the search box to learn more about \"Fainting: Care Instructions. \"  Current as of: June 26, 2019  Content Version: 12.2  © 0792-9167 "[employer]", [employer]. Care instructions adapted under license by [employer] (which disclaims liability or warranty for this information). If you have questions about a medical condition or this instruction, always ask your healthcare professional. [address] any warranty or liability for your use of this information.

## 2019-12-24 NOTE — ED PROVIDER NOTES
EMERGENCY DEPARTMENT HISTORY AND PHYSICAL EXAM      Date: 12/24/2019  Patient Name: Nanda Silveira. Patient Age and Sex: 75 y.o. male     History of Presenting Illness     Chief Complaint   Patient presents with    Syncope     Per EMS pt had sycopal episode at home witnessed by friend around 1200pm today       History Provided By: Patient    HPI: Nanda Silveira. is a 28-year-old male presenting with syncope. Patient states that today he was sitting at the kitchen table talking with a friend when he started to get hot, sweaty, lightheaded and then passed out. His friend brought him slowly to the floor so he did not hit his head. Was out for a few minutes and was disoriented coming back. Feels better now just weak all over. Patient states that about 5 or 6 days ago he was dealing with diarrhea and had a syncopal episode when standing up from the kitchen table. Patient states he has been eating and drinking. Denies any cough, fevers, chest pain, shortness of breath, abdominal pain. There are no other complaints, changes, or physical findings at this time. PCP: Tiffani Gann MD    No current facility-administered medications on file prior to encounter. Current Outpatient Medications on File Prior to Encounter   Medication Sig Dispense Refill    carvedilol (COREG) 25 mg tablet Take 25 mg by mouth two (2) times daily (with meals).  omega 3-dha-epa-fish oil (FISH OIL) 100-160-1,000 mg cap Take  by mouth daily.  multivitamin (ONE A DAY) tablet Take 1 Tab by mouth daily.  HYDROcodone-acetaminophen (NORCO) 5-325 mg per tablet Take 1 Tab by mouth every four (4) hours as needed for Pain. Max Daily Amount: 6 Tabs. 20 Tab 0    bisacodyl (DULCOLAX) 5 mg EC tablet Take 2 Tabs by mouth daily as needed for Constipation. 30 Tab 1    multivitamins-minerals-lutein (CENTRUM SILVER) Tab Take  by mouth daily.         sodium chloride (OCEAN) 0.65 % nasal spray 1 Spray by Nasal route as needed.  carvedilol (COREG) 25 mg tablet Take 25 mg by mouth two (2) times daily (with meals).  simvastatin (ZOCOR) 20 mg tablet Take 20 mg by mouth nightly.  omega-3 fatty acids-vitamin e (FISH OIL) 1,000 mg Cap Take 2 Caps by mouth daily.  hydrochlorothiazide (HYDRODIURIL) 25 mg tablet Take 25 mg by mouth daily. Past History     Past Medical History:  Past Medical History:   Diagnosis Date    Arthritis     back,neck,shoulders    Arthritis     CAD (coronary artery disease)     per 10/2011 cardiology note    Cancer [facility]) Oct. 2011    prostate    Cardiomyopathy, alcoholic ([facility] [address]     per 48/5201 cardiology note    Essential hypertension     Hypercholesteremia     Hypertension     Other ill-defined conditions(799.89)     elevated cholesterol       Past Surgical History:  Past Surgical History:   Procedure Laterality Date    CARDIAC SURG PROCEDURE UNLIST      cardiac cath x2 normal,2003    COLONOSCOPY N/A 11/8/2016    COLON[facility]ita [facility]tt [facility]OSCOPY    HX HEENT      tonsillectomy    HX ORTHOPAEDIC      left ankle plate & screws    HX OTHER SURGICAL      colonoscopy    HX PROSTATE SURGERY      HX PROSTATECTOMY  10/19/11    ROBOTIC ASSISTED LAPAROSCOPIC PROSTATECTOMY WITH LEFT PELVIC LYMPH NODE DISSECTION    AR PROSTATE BIOPSY, NEEDLE, SATURATION SAMPLING      08/17/2011       Family History:  Family History   Problem Relation Age of Onset    Seizures Mother     Stroke Mother        Social History:  Social History     Tobacco Use    Smoking status: Never Smoker    Smokeless tobacco: Never Used   Substance Use Topics    Alcohol use: Yes    Drug use: Never       Allergies: Allergies   Allergen Reactions    Adhesive Itching     bandaide causes itching,irritates skin  (9/1/15 - patient states that bandaids only cause irritation if left on for long period.   Tested negative for latex allergy)         Review of Systems   Review of Systems   Constitutional: Positive for diaphoresis and fatigue. Negative for chills and fever. Respiratory: Negative for cough and shortness of breath. Cardiovascular: Negative for chest pain and leg swelling. Gastrointestinal: Positive for diarrhea. Negative for abdominal pain, constipation, nausea and vomiting. Genitourinary: Negative for dysuria and hematuria. Neurological: Positive for weakness (general). Negative for numbness. All other systems reviewed and are negative. Physical Exam   Physical Exam  Vitals signs and nursing note reviewed. Constitutional:       Appearance: He is well-developed. HENT:      Head: Normocephalic and atraumatic. Comments: Slightly dry mucous membranes  Eyes:      Conjunctiva/sclera: Conjunctivae normal.   Neck:      Musculoskeletal: Normal range of motion and neck supple. Cardiovascular:      Rate and Rhythm: Normal rate and regular rhythm. Pulmonary:      Effort: Pulmonary effort is normal. No respiratory distress. Breath sounds: Normal breath sounds. Abdominal:      General: There is no distension. Palpations: Abdomen is soft. Tenderness: There is no tenderness. Musculoskeletal: Normal range of motion. Skin:     General: Skin is warm and dry. Neurological:      Mental Status: He is alert and oriented to person, place, and time.           Diagnostic Study Results     Labs -     Recent Results (from the past 12 hour(s))   CBC WITH AUTOMATED DIFF    Collection Time: 12/24/19  1:01 PM   Result Value Ref Range    WBC 10.7 4.1 - 11.1 K/uL    RBC 3.38 (L) 4.10 - 5.70 M/uL    HGB 11.2 (L) 12.1 - 17.0 g/dL    HCT 32.7 (L) 36.6 - 50.3 %    MCV 96.7 80.0 - 99.0 FL    MCH 33.1 26.0 - 34.0 PG    MCHC 34.3 30.0 - 36.5 g/dL    RDW 15.9 (H) 11.5 - 14.5 %    PLATELET 704 (L) 669 - 400 K/uL    MPV 9.3 8.9 - 12.9 FL    NRBC 0.0 0  WBC    ABSOLUTE NRBC 0.00 0.00 - 0.01 K/uL    NEUTROPHILS 67 32 - 75 %    LYMPHOCYTES 15 12 - 49 % MONOCYTES 11 5 - 13 %    EOSINOPHILS 5 0 - 7 %    BASOPHILS 1 0 - 1 %    IMMATURE GRANULOCYTES 1 (H) 0.0 - 0.5 %    ABS. NEUTROPHILS 7.3 1.8 - 8.0 K/UL    ABS. LYMPHOCYTES 1.6 0.8 - 3.5 K/UL    ABS. MONOCYTES 1.2 (H) 0.0 - 1.0 K/UL    ABS. EOSINOPHILS 0.5 (H) 0.0 - 0.4 K/UL    ABS. BASOPHILS 0.1 0.0 - 0.1 K/UL    ABS. IMM. GRANS. 0.1 (H) 0.00 - 0.04 K/UL    DF AUTOMATED     METABOLIC PANEL, COMPREHENSIVE    Collection Time: 12/24/19  1:01 PM   Result Value Ref Range    Sodium 133 (L) 136 - 145 mmol/L    Potassium 4.1 3.5 - 5.1 mmol/L    Chloride 102 97 - 108 mmol/L    CO2 25 21 - 32 mmol/L    Anion gap 6 5 - 15 mmol/L    Glucose 102 (H) 65 - 100 mg/dL    BUN 13 6 - 20 MG/DL    Creatinine 0.93 0.70 - 1.30 MG/DL    BUN/Creatinine ratio 14 12 - 20      GFR est AA >60 >60 ml/min/1.73m2    GFR est non-AA >60 >60 ml/min/1.73m2    Calcium 8.1 (L) 8.5 - 10.1 MG/DL    Bilirubin, total 0.5 0.2 - 1.0 MG/DL    ALT (SGPT) 46 12 - 78 U/L    AST (SGOT) 45 (H) 15 - 37 U/L    Alk. phosphatase 98 45 - 117 U/L    Protein, total 7.0 6.4 - 8.2 g/dL    Albumin 3.3 (L) 3.5 - 5.0 g/dL    Globulin 3.7 2.0 - 4.0 g/dL    A-G Ratio 0.9 (L) 1.1 - 2.2     TROPONIN I    Collection Time: 12/24/19  1:01 PM   Result Value Ref Range    Troponin-I, Qt. <0.05 <0.05 ng/mL       Radiologic Studies -   XR CHEST PORT   Final Result   IMPRESSION: No acute findings. CT Results  (Last 48 hours)    None        CXR Results  (Last 48 hours)               12/24/19 1323  XR CHEST PORT Final result    Impression:  IMPRESSION: No acute findings. Narrative:  EXAM: XR CHEST PORT       INDICATION: syncope       COMPARISON: June 2012       FINDINGS: A portable AP radiograph of the chest was obtained at 1259 hours. The   patient is on a cardiac monitor. The lungs are clear. There is atherosclerosis   of the aorta. The bones and soft tissues are grossly within normal limits.                     Medical Decision Making   I am the first provider for this patient. I reviewed the vital signs, available nursing notes, past medical history, past surgical history, family history and social history. Vital Signs-Reviewed the patient's vital signs. Patient Vitals for the past 12 hrs:   Temp Pulse Resp BP SpO2   12/24/19 1257 -- 77 -- 140/62 --   12/24/19 1256 -- (!) 7 -- 140/64 --   12/24/19 1254 -- 73 -- 141/64 --   12/24/19 1250 -- -- -- -- 100 %   12/24/19 1246 98 °F (36.7 °C) 72 14 145/67 99 %       Records Reviewed: Nursing Notes and Old Medical Records    Provider Notes (Medical Decision Making):   Patient presenting with syncope in the setting of recent viral infection and diarrhea. Most likely orthostatic syncope versus dehydration. Will get labs, vitals, EKG and orthostatics. Less likely ACS, PE, but possibly electrolyte related. ED Course:   Initial assessment performed. The patients presenting problems have been discussed, and they are in agreement with the care plan formulated and outlined with them. I have encouraged them to ask questions as they arise throughout their visit. ED Course as of Dec 24 1408   Tue Dec 24, 2019   1314 EKG interpreted by me. Shows normal sinus rhythm with a rate of 70. Old left bundle branch block. No ST elevations depressions concerning for ischemia. [JS]      ED Course User Index  [JS] Ginette Landry MD     Critical Care Time:   0    Disposition:  Discharge Note:  The patient has been re-evaluated and is ready for discharge. Reviewed available results with patient. Counseled patient on diagnosis and care plan. Patient has expressed understanding, and all questions have been answered. Patient agrees with plan and agrees to follow up as recommended, or to return to the ED if their symptoms worsen. Discharge instructions have been provided and explained to the patient, along with reasons to return to the ED. PLAN:  Current Discharge Medication List        2.    Follow-up Information     Follow up With Specialties Details Why Contact Info    Ruby Holliday MD Family Practice  As needed [address] [address]  [phone]          3. Return to ED if worse     Diagnosis     Clinical Impression:   1. Syncope and collapse        Attestations:    Aristides Ortiz M.D. Please note that this dictation was completed with ParaShoot, the computer voice recognition software. Quite often unanticipated grammatical, syntax, homophones, and other interpretive errors are inadvertently transcribed by the computer software. Please disregard these errors. Please excuse any errors that have escaped final proofreading. Thank you.

## 2019-12-24 NOTE — ED NOTES
Reviewed discharge instructions with patient. No questions verbalized at this time. Pt ambulatory on discharge.

## 2019-12-25 LAB
ATRIAL RATE: 70 BPM
CALCULATED P AXIS, ECG09: 19 DEGREES
CALCULATED R AXIS, ECG10: -22 DEGREES
CALCULATED T AXIS, ECG11: 79 DEGREES
DIAGNOSIS, 93000: NORMAL
P-R INTERVAL, ECG05: 156 MS
Q-T INTERVAL, ECG07: 440 MS
QRS DURATION, ECG06: 152 MS
QTC CALCULATION (BEZET), ECG08: 475 MS
VENTRICULAR RATE, ECG03: 70 BPM

## 2020-01-07 ENCOUNTER — OFFICE VISIT (OUTPATIENT)
Dept: NEUROLOGY | Age: 75
End: 2020-01-07

## 2020-01-07 VITALS
WEIGHT: 179 LBS | OXYGEN SATURATION: 97 % | SYSTOLIC BLOOD PRESSURE: 128 MMHG | HEART RATE: 72 BPM | HEIGHT: 71 IN | DIASTOLIC BLOOD PRESSURE: 78 MMHG | BODY MASS INDEX: 25.06 KG/M2

## 2020-01-07 DIAGNOSIS — G60.9 IDIOPATHIC PERIPHERAL NEUROPATHY: Primary | ICD-10-CM

## 2020-01-07 RX ORDER — AMLODIPINE BESYLATE 5 MG/1
5 TABLET ORAL DAILY
COMMUNITY
End: 2022-08-23

## 2020-01-07 RX ORDER — CELECOXIB 200 MG/1
CAPSULE ORAL DAILY
COMMUNITY
End: 2022-08-23

## 2020-01-07 NOTE — PROGRESS NOTES
Neurology Note    Patient ID:  Riley Yao  [id number]  51 y.o.  1945      Date of Consultation:  January 7, 2020    Referring Physician: Ophelia Hines NP    Reason for Consultation:  Leg stiffness    Subjective: my legs are stiff. History of Present Illness:   Riley Bland is a 75 y.o. male who returns to the neurology clinic at [facility] for an evaluation. Please see my initial history and physical from November 5, 2019 which outlines the history of his present illness. After that visit he did have an EMG nerve conduction study performed on November 8. 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 He also did have serology looking for a possible etiology for his neuropathy, but no clear identifiable cause was found. Since that visit, he Did have a bad case of diarrhea and then the next day he went to see his pcp. He tried to keep hydrated. He then stood up too quickly after getting home and fell to the ground. He passed out but came through quickly. This then happened again and was dehydrated. Came to the ER and given additional fluids. He still is a bit orthostatic and needs to be cautious of his movement. There have been no more episodes. He is using hiking stick outside of the house now and that helps. He still has the numbness in his feet bilaterally. This is no worse. He continues to follow closely with his dermatologist for all of his skin lesions.     Past Medical History:   Diagnosis Date    Arthritis     back,neck,shoulders    Arthritis     CAD (coronary artery disease)     per 10/2011 cardiology note    Cancer [facility]) Oct. 2011    prostate    Cardiomyopathy, alcoholic ([facility] [address]     per 04/7267 cardiology note    Essential hypertension     Hypercholesteremia     Hypertension     Other ill-defined conditions(799.89)     elevated cholesterol        Past Surgical History:   Procedure Laterality Date    CARDIAC SURG PROCEDURE UNLIST      cardiac cath x2 normal,2003    COLONOSCOPY N/A 11/8/2016    COLONOS[facility] Adnrew Cottrell [facility] at [facility] ENDOSCOPY    HX HEENT      tonsillectomy    HX ORTHOPAEDIC      left ankle plate & screws    HX OTHER SURGICAL      colonoscopy    HX PROSTATE SURGERY      HX PROSTATECTOMY  10/19/11    ROBOTIC ASSISTED LAPAROSCOPIC PROSTATECTOMY WITH LEFT PELVIC LYMPH NODE DISSECTION    SD PROSTATE BIOPSY, NEEDLE, SATURATION SAMPLING      08/17/2011        Family History   Problem Relation Age of Onset    Seizures Mother     Stroke Mother         Social History     Tobacco Use    Smoking status: Never Smoker    Smokeless tobacco: Never Used   Substance Use Topics    Alcohol use: Yes        Allergies   Allergen Reactions    Adhesive Itching     bandaide causes itching,irritates skin  (9/1/15 - patient states that bandaids only cause irritation if left on for long period. Tested negative for latex allergy)        Prior to Admission medications    Medication Sig Start Date End Date Taking? Authorizing Provider   amLODIPine (NORVASC) 5 mg tablet Take 5 mg by mouth daily. Yes Provider, Historical   celecoxib (CELEBREX) 200 mg capsule Take  by mouth daily. Yes Provider, Historical   omega 3-dha-epa-fish oil (FISH OIL) 100-160-1,000 mg cap Take  by mouth daily. Yes Provider, Historical   multivitamins-minerals-lutein (CENTRUM SILVER) Tab Take  by mouth daily. Yes Other, MD Sheila   carvedilol (COREG) 25 mg tablet Take 25 mg by mouth two (2) times daily (with meals). Yes Provider, Historical   HYDROcodone-acetaminophen (NORCO) 5-325 mg per tablet Take 1 Tab by mouth every four (4) hours as needed for Pain. Max Daily Amount: 6 Tabs. 6/4/16   Terrence Sterling NP   bisacodyl (DULCOLAX) 5 mg EC tablet Take 2 Tabs by mouth daily as needed for Constipation. 6/17/12   Milana Moreno PA-C   sodium chloride (OCEAN) 0.65 % nasal spray 1 Spray by Nasal route as needed. Provider, Historical   simvastatin (ZOCOR) 20 mg tablet Take 20 mg by mouth nightly. Provider, Historical   hydrochlorothiazide (HYDRODIURIL) 25 mg tablet Take 25 mg by mouth daily. Provider, Historical     On celebrex to help with arthritis. Review of Systems:    General, constitutional: negative  Eyes, vision: negative  Ears, nose, throat: negative  Cardiovascular, heart: negative  Respiratory: negative  Gastrointestinal: negative  Genitourinary: negative  Musculoskeletal: Back and joint pain  Skin and integumentary: Multiple skin bruises  Psychiatric: negative  Endocrine: negative  Neurological: negative, except for HPI  Hematologic/lymphatic: negative  Allergy/immunology: negative      Objective:     Visit Vitals  /78   Pulse 72   Ht 5' 11\" (1.803 m)   Wt 179 lb (81.2 kg)   SpO2 97%   BMI 24.97 kg/m²       Physical Exam:      General:  appears well nourished in no acute distress  Neck: no carotid bruits  Lungs: clear to auscultation  Heart:  no murmurs, regular rate  Lower extremity: peripheral pulses palpable and no edema. Skin: intact, ever there is multiple skin lesions    Neurological exam:    Awake, alert, oriented to person, place and time  Recent and remote memory were normal  Attention and concentration were intact  Language was intact. There was no aphasia  Speech: no dysarthria  Fund of knowledge was preserved    Cranial nerves:   II-XII were tested    [employer] fields were full  Eomi, no evidence of nystagmus  Facial sensation:  normal and symmetric  Facial motor: normal and symmetric  Hearing is diminished  SCM strength intact  Tongue: midline without fasciculations    Motor: Tone normal    No evidence of fasciculations    Strength testing:   deltoid triceps biceps Wrist ext. Wrist flex. intrinsics Hip flex. Hip ext. Knee ext.   Knee flex Dorsi flex Plantar flex   Right 5 5 5 5 5 5 5 5 5 5 5 5   Left 5 5 5 5 5 5 5 5 5 5 5 5         Sensory:  Upper extremity: intact to pp, light touch, and vibration > 10 seconds  Lower extremity: His vibration is absent at his toes and present for only 2 seconds at his ankles. It is present for 6 seconds and his knees. His pinprick is decreased to just above his ankles  This is unchanged from his prior visit. Reflexes:    Right Left  Biceps  2 2  Triceps 2 2  Brachiorad. 2 2  Patella  3 3  Achilles 1 1    Plantar response:  flexor bilaterally      Cerebellar testing:  no tremor apparent, finger/nose and frank were intact    Romberg: Present    Gait: steady however wide-based. He is unable to tandem gait. He also could not walk on his toes or heels. Labs:     Labs pertinent to his neuropathy were checked. This includes a normal vitamin B12 level. A normal serum immunofixation. A normal methylmalonic acid. A normal hemoglobin A1c. Normal liver functioning  Normal free T4  Sjogren's testing was normal  Normal CARSON        Assessment and Plan: This is a pleasant 76-year-old gentleman with multiple problems including lumbar degeneration which is impacting his overall neurological health who presents with difficulty with balance and leg stiffness. 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 1. Sensory motor axonal neuropathy  He did have significant serology which was performed which did not provide any clear etiology for his neuropathy. This does fall in the category of idiopathic. I did explain this to him at length today. His lumbar spine disease does contribute some to his overall symptoms of sensory loss. We did talk about medication that can help with neuropathic pain but at this time he only has symptoms of numbness. Given this, we will hold off on any medication. If he does develop neuropathic pain, he will notify me and we can look into starting a medication    Neuropathy:  we reviewed the causes contributing to the neuropathy.   We discussed the importance of exercise and activity. I also reviewed the importance of safety with ambulation and ways to prevents falls. I really stressed to him the importance of preventing falls. He will continue to use his hiking stick and I commended him for this. 2. Lumbar spine disease  He will continue with his stretching and core strengthening. There was no surgical intervention that was recommended at this time. The patient should return to clinic in 6 months    Renewed medication: none today    I spent 25    minutes with the patient  with over 50 % of the time counseling and coordinating the care plan in regards to the diagnosis, diagnostic testing, and treatment plan. The patient had the ability to ask questions and all questions were answered.          Signed By:  Ricardo Espinoza DO FAAN    January 7, 2020

## 2020-01-07 NOTE — PATIENT INSTRUCTIONS
A Healthy Lifestyle: Care Instructions  Your Care Instructions    A healthy lifestyle can help you feel good, stay at a healthy weight, and have plenty of energy for both work and play. A healthy lifestyle is something you can share with your whole family. A healthy lifestyle also can lower your risk for serious health problems, such as high blood pressure, heart disease, and diabetes. You can follow a few steps listed below to improve your health and the health of your family. Follow-up care is a key part of your treatment and safety. Be sure to make and go to all appointments, and call your doctor if you are having problems. It's also a good idea to know your test results and keep a list of the medicines you take. How can you care for yourself at home? · Do not eat too much sugar, fat, or fast foods. You can still have dessert and treats now and then. The goal is moderation. · Start small to improve your eating habits. Pay attention to portion sizes, drink less juice and soda pop, and eat more fruits and vegetables. ? Eat a healthy amount of food. A 3-ounce serving of meat, for example, is about the size of a deck of cards. Fill the rest of your plate with vegetables and whole grains. ? Limit the amount of soda and sports drinks you have every day. Drink more water when you are thirsty. ? Eat at least 5 servings of fruits and vegetables every day. It may seem like a lot, but it is not hard to reach this goal. A serving or helping is 1 piece of fruit, 1 cup of vegetables, or 2 cups of leafy, raw vegetables. Have an apple or some carrot sticks as an afternoon snack instead of a candy bar. Try to have fruits and/or vegetables at every meal.  · Make exercise part of your daily routine. You may want to start with simple activities, such as walking, bicycling, or slow swimming. Try to be active 30 to 60 minutes every day. You do not need to do all 30 to 60 minutes all at once.  For example, you can exercise 3 times a day for 10 or 20 minutes. Moderate exercise is safe for most people, but it is always a good idea to talk to your doctor before starting an exercise program.  · Keep moving. Austyn Hymanter the lawn, work in the garden, or [employer]. Take the stairs instead of the elevator at work. · If you smoke, quit. People who smoke have an increased risk for heart attack, stroke, cancer, and other lung illnesses. Quitting is hard, but there are ways to boost your chance of quitting tobacco for good. ? Use nicotine gum, patches, or lozenges. ? Ask your doctor about stop-smoking programs and medicines. ? Keep trying. In addition to reducing your risk of diseases in the future, you will notice some benefits soon after you stop using tobacco. If you have shortness of breath or asthma symptoms, they will likely get better within a few weeks after you quit. · Limit how much alcohol you drink. Moderate amounts of alcohol (up to 2 drinks a day for men, 1 drink a day for women) are okay. But drinking too much can lead to liver problems, high blood pressure, and other health problems. Family health  If you have a family, there are many things you can do together to improve your health. · Eat meals together as a family as often as possible. · Eat healthy foods. This includes fruits, vegetables, lean meats and dairy, and whole grains. · Include your family in your fitness plan. Most people think of activities such as jogging or tennis as the way to fitness, but there are many ways you and your family can be more active. Anything that makes you breathe hard and gets your heart pumping is exercise. Here are some tips:  ? Walk to do errands or to take your child to school or the bus.  ? Go for a family bike ride after dinner instead of watching TV. Where can you learn more? Go to http://roxane-jennifer.info/. Enter R529 in the search box to learn more about \"A Healthy Lifestyle: Care Instructions. \"  Current as of: May 28, 2019  Content Version: 12.2  © 1600-0066 [employer]. Care instructions adapted under license by [employer] (which disclaims liability or warranty for this information). If you have questions about a medical condition or this instruction, always ask your healthcare professional. [address] any warranty or liability for your use of this information.

## 2020-07-05 NOTE — PROGRESS NOTES
Neurology Note    Patient ID:  Summer Wylie  [id number]  43 y.o.  1945      Date of Consultation:  July 6, 2020    Referring Physician: Arely Carter NP    Reason for Consultation: Neuropathy    Subjective: my feet are still numb       History of Present Illness:   Summer Grant is a 75 y.o. male who returns to the neurology clinic at [facility] for an evaluation. He was last seen in clinic on January 7, 2020. Please see my history of present illness, examination, and treatment based plan from that day. He was being followed for an idiopathic sensorimotor axonal neuropathy. Since that time, he feels that the neuropathy is about the same. Numbness is about the same. He feels this most in his toes. He feels cold often. He has not had any falls. He continues to use his hiking stick outside. He denies any new weakness. He continues to follow closely with his dermatologist for all of his skin lesions. It continues to bruise quite easily. He did have some diarrhea recently, but not bad today. There has been no hospitalizations since he was last here.         Past Medical History:   Diagnosis Date    Arthritis     back,neck,shoulders    Arthritis     CAD (coronary artery disease)     per 10/2011 cardiology note    Cancer [facility]) Oct. 2011    prostate    Cardiomyopathy, alcoholic ([facility] [address]     per 81/4138 cardiology note    Essential hypertension     Hypercholesteremia     Hypertension     Other ill-defined conditions(799.89)     elevated cholesterol        Past Surgical History:   Procedure Laterality Date    CARDIAC SURG PROCEDURE UNLIST      cardiac cath x2 normal,2003    COLONOSCOPY N/A 11/8/2016    COLONOSCOPY performed by [facility]igna Jaquez [facility] at [facility] ENDOSCOPY    HX HEENT      tonsillectomy    HX ORTHOPAEDIC      left ankle plate & screws    HX OTHER SURGICAL      colonoscopy    HX PROSTATE SURGERY      HX PROSTATECTOMY  10/19/11    ROBOTIC ASSISTED LAPAROSCOPIC PROSTATECTOMY WITH LEFT PELVIC LYMPH NODE DISSECTION    LA PROSTATE BIOPSY, NEEDLE, SATURATION SAMPLING      08/17/2011        Family History   Problem Relation Age of Onset    Seizures Mother     Stroke Mother         Social History     Tobacco Use    Smoking status: Never Smoker    Smokeless tobacco: Never Used   Substance Use Topics    Alcohol use: Yes        Allergies   Allergen Reactions    Adhesive Itching     bandaide causes itching,irritates skin  (9/1/15 - patient states that bandaids only cause irritation if left on for long period. Tested negative for latex allergy)        Prior to Admission medications    Medication Sig Start Date End Date Taking? Authorizing Provider   losartan (COZAAR) 50 mg tablet Take  by mouth daily. Yes Provider, Historical   omega 3-dha-epa-fish oil (FISH OIL) 100-160-1,000 mg cap Take  by mouth daily. Yes Provider, Historical   multivitamins-minerals-lutein (CENTRUM SILVER) Tab Take  by mouth daily. Yes Other, MD Sheila   carvedilol (COREG) 25 mg tablet Take 25 mg by mouth two (2) times daily (with meals). Yes Provider, Historical   amLODIPine (NORVASC) 5 mg tablet Take 5 mg by mouth daily. Provider, Historical   celecoxib (CELEBREX) 200 mg capsule Take  by mouth daily. Provider, Historical   HYDROcodone-acetaminophen (NORCO) 5-325 mg per tablet Take 1 Tab by mouth every four (4) hours as needed for Pain. Max Daily Amount: 6 Tabs. 6/4/16   Adina Quiroz NP   bisacodyl (DULCOLAX) 5 mg EC tablet Take 2 Tabs by mouth daily as needed for Constipation. 6/17/12   Petty Moreno PA-C   sodium chloride (OCEAN) 0.65 % nasal spray 1 Spray by Nasal route as needed. Provider, Historical   simvastatin (ZOCOR) 20 mg tablet Take 20 mg by mouth nightly. Provider, Historical   hydrochlorothiazide (HYDRODIURIL) 25 mg tablet Take 25 mg by mouth daily. Provider, Historical     On celebrex to help with arthritis.      Review of Systems:    General, constitutional: negative  Eyes, vision: negative  Ears, nose, throat: negative  Cardiovascular, heart: negative  Respiratory: negative  Gastrointestinal: negative  Genitourinary: negative  Musculoskeletal: Back and joint pain  Skin and integumentary: Multiple skin bruises  Psychiatric: negative  Endocrine: negative  Neurological: negative, except for HPI  Hematologic/lymphatic: negative  Allergy/immunology: negative      Objective:     Visit Vitals  /64   Pulse 94   Ht 5' 11\" (1.803 m)   Wt 182 lb (82.6 kg)   SpO2 98%   BMI 25.38 kg/m²       Physical Exam:  General:  appears well nourished in no acute distress  Neck: no carotid bruits  Lungs: clear to auscultation  Heart:  no murmurs, regular rate  Lower extremity: peripheral pulses palpable and no edema. Skin: intact, ever there is multiple skin lesions    Neurological exam:    Awake, alert, oriented to person, place and time  Recent and remote memory were normal  Attention and concentration were intact  Language was intact. There was no aphasia  Speech: no dysarthria  Fund of knowledge was preserved    Cranial nerves:   II-XII were tested    [employer] fields were full  Eomi, no evidence of nystagmus  Facial sensation:  normal and symmetric  Facial motor: normal and symmetric  Hearing is diminished  SCM strength intact  Tongue: midline without fasciculations    Motor: Tone normal    No evidence of fasciculations    Strength testing:   deltoid triceps biceps Wrist ext. Wrist flex. intrinsics Hip flex. Hip ext. Knee ext. Knee flex Dorsi flex Plantar flex   Right 5 5 5 5 5 5 5 5 5 5 5 5   Left 5 5 5 5 5 5 5 5 5 5 5 5         Sensory:  Upper extremity: intact to pp, light touch, and vibration > 10 seconds  Lower extremity: His vibration is absent at his toes and present for only 2 seconds at his ankles. It is present for 6 seconds and his knees.   His pinprick is decreased to just above his ankles  This is essentially unchanged from his prior visit of January      Reflexes:    Right Left  Biceps  2 2  Triceps 2 2  Brachiorad. 2 2  Patella  3 3  Achilles 1 1    Plantar response:  flexor bilaterally      Cerebellar testing:  no tremor apparent, finger/nose and frank were intact    Romberg: Present    Gait: steady however wide-based. He is unable to tandem gait. He also could not walk on his toes or heels. Labs:     Labs pertinent to his neuropathy were checked. This includes a normal vitamin B12 level. A normal serum immunofixation. A normal methylmalonic acid. A normal hemoglobin A1c. Normal liver functioning  Normal free T4  Sjogren's testing was normal  Normal CARSON        Assessment and Plan: This is a pleasant 69-year-old gentleman with multiple problems including lumbar degeneration which is impacting his overall neurological health who presents with difficulty with balance and leg stiffness. 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 Examination is relatively unchanged from prior visit 6 months ago    1. Sensory motor axonal neuropathy  He did have significant serology which was performed which did not provide any clear etiology for his neuropathy. This does fall in the category of idiopathic. I did explain this to him at length today. His lumbar spine disease does contribute some to his overall symptoms of sensory loss. We did talk about medication that can help with neuropathic pain but at this time he only has symptoms of numbness. Given this, we will hold off on any medication. He is content with this. If he does develop neuropathic pain, he will notify me and we can look into starting a medication    Neuropathy:  we reviewed the causes contributing to the neuropathy. We discussed the importance of exercise and activity. I also reviewed the importance of safety with ambulation and ways to prevents falls.    I really stressed to him the importance of preventing falls. He will continue to use his hiking stick and I commended him for this. We also talked about the importance of trying to increase his daily activity. 2. Lumbar spine disease  He will continue with his stretching and core strengthening. There was no surgical intervention that was recommended at this time. 3. Coronavirus pandemic:  I did discuss with the patient at length the importance of social distancing and proper hygiene especially during these times. The patient is at a higher risk of having a more severe course of the disease and needs to follow all CDC recommendations. The patient acknowledges. The patient should return to clinic in 6 months    Renewed medication: none today    I spent  30    minutes with the patient  with over 50 % of the time counseling and coordinating the care plan in regards to the diagnosis, diagnostic testing, and treatment plan. The patient had the ability to ask questions and all questions were answered.          Signed By:  Maria Del Carmen Root DO FAAN    July 6, 2020

## 2020-07-06 ENCOUNTER — OFFICE VISIT (OUTPATIENT)
Dept: NEUROLOGY | Age: 75
End: 2020-07-06

## 2020-07-06 VITALS
BODY MASS INDEX: 25.48 KG/M2 | HEIGHT: 71 IN | OXYGEN SATURATION: 98 % | DIASTOLIC BLOOD PRESSURE: 64 MMHG | HEART RATE: 94 BPM | SYSTOLIC BLOOD PRESSURE: 122 MMHG | WEIGHT: 182 LBS

## 2020-07-06 DIAGNOSIS — G60.9 IDIOPATHIC PERIPHERAL NEUROPATHY: Primary | ICD-10-CM

## 2020-07-06 RX ORDER — LOSARTAN POTASSIUM 50 MG/1
TABLET ORAL DAILY
COMMUNITY
End: 2022-08-23

## 2021-01-02 ENCOUNTER — HOSPITAL ENCOUNTER (EMERGENCY)
Age: 76
Discharge: HOME | End: 2021-01-03
Attending: EMERGENCY MEDICINE
Payer: MEDICARE

## 2021-01-02 VITALS
DIASTOLIC BLOOD PRESSURE: 69 MMHG | OXYGEN SATURATION: 96 % | HEART RATE: 60 BPM | RESPIRATION RATE: 16 BRPM | SYSTOLIC BLOOD PRESSURE: 163 MMHG | HEIGHT: 71 IN | WEIGHT: 170 LBS | TEMPERATURE: 97.4 F | BODY MASS INDEX: 23.8 KG/M2

## 2021-01-02 DIAGNOSIS — M79.18 RHOMBOID MUSCLE PAIN: Primary | ICD-10-CM

## 2021-01-02 PROCEDURE — 74011250636 HC RX REV CODE- 250/636: Performed by: EMERGENCY MEDICINE

## 2021-01-02 PROCEDURE — 99283 EMERGENCY DEPT VISIT LOW MDM: CPT

## 2021-01-02 PROCEDURE — 74011000250 HC RX REV CODE- 250: Performed by: EMERGENCY MEDICINE

## 2021-01-02 PROCEDURE — 74011250637 HC RX REV CODE- 250/637: Performed by: EMERGENCY MEDICINE

## 2021-01-02 PROCEDURE — 96372 THER/PROPH/DIAG INJ SC/IM: CPT

## 2021-01-02 RX ORDER — NAPROXEN 500 MG/1
500 TABLET ORAL
Qty: 20 TAB | Refills: 0 | Status: SHIPPED | OUTPATIENT
Start: 2021-01-02 | End: 2022-08-23

## 2021-01-02 RX ORDER — METHOCARBAMOL 500 MG/1
500 TABLET, FILM COATED ORAL 4 TIMES DAILY
Qty: 20 TAB | Refills: 0 | Status: SHIPPED | OUTPATIENT
Start: 2021-01-02 | End: 2022-08-23

## 2021-01-02 RX ORDER — LIDOCAINE 4 G/100G
1 PATCH TOPICAL EVERY 12 HOURS
Qty: 10 PATCH | Refills: 0 | Status: SHIPPED | OUTPATIENT
Start: 2021-01-02 | End: 2022-08-23

## 2021-01-02 RX ORDER — METHOCARBAMOL 750 MG/1
750 TABLET, FILM COATED ORAL
Status: COMPLETED | OUTPATIENT
Start: 2021-01-02 | End: 2021-01-02

## 2021-01-02 RX ORDER — KETOROLAC TROMETHAMINE 30 MG/ML
30 INJECTION, SOLUTION INTRAMUSCULAR; INTRAVENOUS
Status: COMPLETED | OUTPATIENT
Start: 2021-01-02 | End: 2021-01-02

## 2021-01-02 RX ORDER — LIDOCAINE 4 G/100G
1 PATCH TOPICAL EVERY 24 HOURS
Status: DISCONTINUED | OUTPATIENT
Start: 2021-01-02 | End: 2021-01-03 | Stop reason: HOSPADM

## 2021-01-02 RX ADMIN — METHOCARBAMOL TABLETS 750 MG: 750 TABLET, COATED ORAL at 23:43

## 2021-01-02 RX ADMIN — KETOROLAC TROMETHAMINE 30 MG: 30 INJECTION, SOLUTION INTRAMUSCULAR; INTRAVENOUS at 23:43

## 2021-01-03 NOTE — ED TRIAGE NOTES
Pt presents w/ c/o right upper back and shoulder pain x3 days.  Pt called EMS because it was keeping him awake

## 2021-01-03 NOTE — ED PROVIDER NOTES
EMERGENCY DEPARTMENT HISTORY AND PHYSICAL EXAM      Date: 1/2/2021  Patient Name: Asael Swift. Patient Age and Sex: 67 y.o. male     History of Presenting Illness     Chief Complaint   Patient presents with    Back Pain    Shoulder Pain       History Provided By: Patient    HPI: Asael Swift. 69-year-old male with a history of hypertension, arthritis, presenting with back pain. Patient states that overnight was having terrible pain near his shoulder blade. States that every time he moves his arm has terrible pain near his shoulder pain and radiating to his spine. Denies any trauma to the area or any trauma to his shoulder. Patient states that he took extra strength Tylenol with minimal response. Patient states that at 6 AM the pain got even worse. States he has been struggling all day. Has not taken any anti-inflammatories. Denies any pulling of his shoulder or any trauma that he can remember. States that he did have a biopsy removal from his back recently. There are no other complaints, changes, or physical findings at this time. PCP: Dhruv Holder MD    No current facility-administered medications on file prior to encounter. Current Outpatient Medications on File Prior to Encounter   Medication Sig Dispense Refill    losartan (COZAAR) 50 mg tablet Take  by mouth daily.  amLODIPine (NORVASC) 5 mg tablet Take 5 mg by mouth daily.  celecoxib (CELEBREX) 200 mg capsule Take  by mouth daily.  omega 3-dha-epa-fish oil (FISH OIL) 100-160-1,000 mg cap Take  by mouth daily.  HYDROcodone-acetaminophen (NORCO) 5-325 mg per tablet Take 1 Tab by mouth every four (4) hours as needed for Pain. Max Daily Amount: 6 Tabs. 20 Tab 0    bisacodyl (DULCOLAX) 5 mg EC tablet Take 2 Tabs by mouth daily as needed for Constipation. 30 Tab 1    multivitamins-minerals-lutein (CENTRUM SILVER) Tab Take  by mouth daily.         sodium chloride (OCEAN) 0.65 % nasal spray 1 Spray by Nasal route as needed.  carvedilol (COREG) 25 mg tablet Take 25 mg by mouth two (2) times daily (with meals).  simvastatin (ZOCOR) 20 mg tablet Take 20 mg by mouth nightly.  hydrochlorothiazide (HYDRODIURIL) 25 mg tablet Take 25 mg by mouth daily. Past History     Past Medical History:  Past Medical History:   Diagnosis Date    Arthritis     back,neck,shoulders    Arthritis     CAD (coronary artery disease)     per 10/2011 cardiology note    Cancer [facility]) Oct. 2011    prostate    Cardiomyopathy, alcoholic ([facility] [address]     per 38/9274 cardiology note    Essential hypertension     Hypercholesteremia     Hypertension     Other ill-defined conditions(799.89)     elevated cholesterol       Past Surgical History:  Past Surgical History:   Procedure Laterality Date    CARDIAC SURG PROCEDURE UNLIST      cardiac cath x2 normal,2003    COLONOSCOPY N/A 11/8/2016    COL[facility]OS[facility] Lui Delcid [facility] ENDOSCOPY    HX HEENT      tonsillectomy    HX ORTHOPAEDIC      left ankle plate & screws    HX OTHER SURGICAL      colonoscopy    HX PROSTATE SURGERY      HX PROSTATECTOMY  10/19/11    ROBOTIC ASSISTED LAPAROSCOPIC PROSTATECTOMY WITH LEFT PELVIC LYMPH NODE DISSECTION    MO PROSTATE BIOPSY, NEEDLE, SATURATION SAMPLING      08/17/2011       Family History:  Family History   Problem Relation Age of Onset    Seizures Mother     Stroke Mother        Social History:  Social History     Tobacco Use    Smoking status: Never Smoker    Smokeless tobacco: Never Used   Substance Use Topics    Alcohol use: Yes    Drug use: Never       Allergies: Allergies   Allergen Reactions    Adhesive Itching     bandaide causes itching,irritates skin  (9/1/15 - patient states that bandaids only cause irritation if left on for long period. Tested negative for latex allergy)         Review of Systems   Review of Systems   Constitutional: Negative for chills and fever.    Respiratory: Negative for cough and shortness of breath. Cardiovascular: Negative for chest pain. Gastrointestinal: Negative for abdominal pain, constipation, diarrhea, nausea and vomiting. Genitourinary: Negative for dysuria, frequency and hematuria. Musculoskeletal: Positive for back pain and myalgias. Neurological: Negative for weakness and numbness. All other systems reviewed and are negative. Physical Exam   Physical Exam  Constitutional:       General: He is not in acute distress. Appearance: He is well-developed. HENT:      Head: Normocephalic and atraumatic. Nose: Nose normal.      Mouth/Throat:      Mouth: Mucous membranes are moist.   Eyes:      Extraocular Movements: Extraocular movements intact. Conjunctiva/sclera: Conjunctivae normal.   Neck:      Musculoskeletal: Normal range of motion. Cardiovascular:      Comments: Well perfused  Pulmonary:      Effort: Pulmonary effort is normal. No respiratory distress. Musculoskeletal: Normal range of motion. Comments: She is laying on his left side. He is very tender medial to his right scapula and wincing in pain. Pain elicited when ranging his shoulder though able to do it. Neurological:      General: No focal deficit present. Mental Status: He is alert and oriented to person, place, and time. Psychiatric:         Mood and Affect: Mood normal.          Diagnostic Study Results     Labs -   No results found for this or any previous visit (from the past 12 hour(s)). Radiologic Studies -   No orders to display     CT Results  (Last 48 hours)    None        CXR Results  (Last 48 hours)    None            Medical Decision Making   I am the first provider for this patient. I reviewed the vital signs, available nursing notes, past medical history, past surgical history, family history and social history. Vital Signs-Reviewed the patient's vital signs.   Patient Vitals for the past 12 hrs:   Temp Pulse Resp BP SpO2   01/02/21 2308 97.4 °F (36.3 °C) 60 16 (!) 163/69 96 %       Records Reviewed: Nursing Notes and Old Medical Records    Provider Notes (Medical Decision Making):   Presenting with pain over his rhomboid muscle and near his scapula. Most likely muscle strain or muscle spasms. No trauma so unlikely any fracture. We will plan to treat his pain with muscle relaxants and anti-inflammatories. ED Course:   Initial assessment performed. The patients presenting problems have been discussed, and they are in agreement with the care plan formulated and outlined with them. I have encouraged them to ask questions as they arise throughout their visit. Critical Care Time:   0    Disposition:  Discharge Note:  The patient has been re-evaluated and is ready for discharge. Reviewed available results with patient. Counseled patient on diagnosis and care plan. Patient has expressed understanding, and all questions have been answered. Patient agrees with plan and agrees to follow up as recommended, or to return to the ED if their symptoms worsen. Discharge instructions have been provided and explained to the patient, along with reasons to return to the ED. PLAN:  Current Discharge Medication List      START taking these medications    Details   naproxen (NAPROSYN) 500 mg tablet Take 1 Tab by mouth every twelve (12) hours as needed for Pain. Qty: 20 Tab, Refills: 0      methocarbamoL (Robaxin) 500 mg tablet Take 1 Tab by mouth four (4) times daily. Qty: 20 Tab, Refills: 0      lidocaine 4 % patch 1 Patch by TransDERmal route every twelve (12) hours every twelve (12) hours. Qty: 10 Patch, Refills: 0           2. Follow-up Information     Follow up With Specialties Details Why Contact Info    Rory Dennis MD Family Medicine  As needed [address]  [phone]          3. Return to ED if worse     Diagnosis     Clinical Impression:   1.  Rhomboid muscle pain        Attestations:    Bobbi Fournier Aman Mijares M.D. Please note that this dictation was completed with [employer], the computer voice recognition software. Quite often unanticipated grammatical, syntax, homophones, and other interpretive errors are inadvertently transcribed by the computer software. Please disregard these errors. Please excuse any errors that have escaped final proofreading. Thank you.

## 2021-01-11 ENCOUNTER — OFFICE VISIT (OUTPATIENT)
Dept: NEUROLOGY | Age: 76
End: 2021-01-11
Payer: MEDICARE

## 2021-01-11 VITALS
BODY MASS INDEX: 25.06 KG/M2 | HEART RATE: 83 BPM | HEIGHT: 71 IN | TEMPERATURE: 98.2 F | OXYGEN SATURATION: 98 % | SYSTOLIC BLOOD PRESSURE: 120 MMHG | DIASTOLIC BLOOD PRESSURE: 80 MMHG | WEIGHT: 179 LBS | RESPIRATION RATE: 18 BRPM

## 2021-01-11 DIAGNOSIS — G60.9 IDIOPATHIC PERIPHERAL NEUROPATHY: ICD-10-CM

## 2021-01-11 DIAGNOSIS — M51.36 LUMBAR DEGENERATIVE DISC DISEASE: ICD-10-CM

## 2021-01-11 DIAGNOSIS — G62.9 NEUROPATHY: Primary | ICD-10-CM

## 2021-01-11 PROCEDURE — G8427 DOCREV CUR MEDS BY ELIG CLIN: HCPCS | Performed by: PSYCHIATRY & NEUROLOGY

## 2021-01-11 PROCEDURE — G8432 DEP SCR NOT DOC, RNG: HCPCS | Performed by: PSYCHIATRY & NEUROLOGY

## 2021-01-11 PROCEDURE — 99214 OFFICE O/P EST MOD 30 MIN: CPT | Performed by: PSYCHIATRY & NEUROLOGY

## 2021-01-11 PROCEDURE — 1101F PT FALLS ASSESS-DOCD LE1/YR: CPT | Performed by: PSYCHIATRY & NEUROLOGY

## 2021-01-11 PROCEDURE — G8536 NO DOC ELDER MAL SCRN: HCPCS | Performed by: PSYCHIATRY & NEUROLOGY

## 2021-01-11 PROCEDURE — G8420 CALC BMI NORM PARAMETERS: HCPCS | Performed by: PSYCHIATRY & NEUROLOGY

## 2021-01-11 NOTE — PROGRESS NOTES
Neurology Note    Patient ID:  Syd Velazquez  [id number]  67 y.o.  1945      Date of Consultation:  January 11, 2021      Subjective: my feet are still numb       History of Present Illness:   Syd Mandel is a 67 y.o. male who returns to the neurology clinic at [facility] for an evaluation. He was last seen in clinic on July 6,2020. Please see my history of present illness, examination, and treatment based plan from that day. He was being followed for an idiopathic sensorimotor axonal neuropathy. Since that time, he feels that the neuropathy is about the same or maybe just a slight bit worse. Numbness is about the same. He feels this most in his toes. He feels cold more often. This is probably the only change. He does now sleep in socks. He has not had any falls. He continues to use his hiking stick outside. He denies any new weakness. He did have one ER visit after falling asleep in a chair. He developed pain in his right scapula area. He tried tylenol which wasn't helping. Also with a fever/chills. He was given a muscle relaxant/pain shot and began to feel much better. .        No falls.      He  Asks about getting a DMV placard for his neuropathy    Past Medical History:   Diagnosis Date    Arthritis     back,neck,shoulders    Arthritis     CAD (coronary artery disease)     per 10/2011 cardiology note    Cancer [facility]) Oct. 2011    prostate    Cardiomyopathy, alcoholic ([address]     per 46/1083 cardiology note    Essential hypertension     Hypercholesteremia     Hypertension     Other ill-defined conditions(799.89)     elevated cholesterol        Past Surgical History:   Procedure Laterality Date    COLONOSCOPY N/A 11/8/2016    COLONOSCOPY performed by Gage Ferguson MD at [facility] ENDOSCOPY    HX HEENT      tonsillectomy    HX ORTHOPAEDIC      left ankle plate & screws    HX OTHER SURGICAL      colonoscopy    HX PROSTATE SURGERY      HX PROSTATECTOMY  10/19/11    ROBOTIC ASSISTED LAPAROSCOPIC PROSTATECTOMY WITH LEFT PELVIC LYMPH NODE DISSECTION    MD CARDIAC SURG PROCEDURE UNLIST      cardiac cath x2 normal,2003    MD PROSTATE BIOPSY, NEEDLE, SATURATION SAMPLING      08/17/2011        Family History   Problem Relation Age of Onset    Seizures Mother     Stroke Mother         Social History     Tobacco Use    Smoking status: Never Smoker    Smokeless tobacco: Never Used   Substance Use Topics    Alcohol use: Yes        Allergies   Allergen Reactions    Adhesive Itching     bandaide causes itching,irritates skin  (9/1/15 - patient states that bandaids only cause irritation if left on for long period. Tested negative for latex allergy)        Prior to Admission medications    Medication Sig Start Date End Date Taking? Authorizing Provider   naproxen (NAPROSYN) 500 mg tablet Take 1 Tab by mouth every twelve (12) hours as needed for Pain. 1/2/21  Yes Bib Petty MD   methocarbamoL (Robaxin) 500 mg tablet Take 1 Tab by mouth four (4) times daily. 1/2/21  Yes Bib Petty MD   lidocaine 4 % patch 1 Patch by TransDERmal route every twelve (12) hours every twelve (12) hours. 1/2/21  Yes Bib Petty MD   losartan (COZAAR) 50 mg tablet Take  by mouth daily. Yes Provider, Historical   celecoxib (CELEBREX) 200 mg capsule Take  by mouth daily. Yes Provider, Historical   omega 3-dha-epa-fish oil (FISH OIL) 100-160-1,000 mg cap Take  by mouth daily. Yes Provider, Historical   bisacodyl (DULCOLAX) 5 mg EC tablet Take 2 Tabs by mouth daily as needed for Constipation. 6/17/12  Yes Lindsay Hanson PA-C   multivitamins-minerals-lutein (CENTRUM SILVER) Tab Take  by mouth daily. Yes Other, MD Sheila   sodium chloride (OCEAN) 0.65 % nasal spray 1 Spray by Nasal route as needed. Yes Provider, Historical   carvedilol (COREG) 25 mg tablet Take 25 mg by mouth two (2) times daily (with meals).    Yes Provider, Historical   simvastatin (ZOCOR) 20 mg tablet Take 20 mg by mouth nightly. Yes Provider, Historical   hydrochlorothiazide (HYDRODIURIL) 25 mg tablet Take 25 mg by mouth daily. Yes Provider, Historical   amLODIPine (NORVASC) 5 mg tablet Take 5 mg by mouth daily. Provider, Historical   HYDROcodone-acetaminophen (NORCO) 5-325 mg per tablet Take 1 Tab by mouth every four (4) hours as needed for Pain. Max Daily Amount: 6 Tabs. 6/4/16   Cisco Taylor NP       Review of Systems:    General, constitutional: negative  Eyes, vision: negative  Ears, nose, throat: negative  Cardiovascular, heart: negative  Respiratory: negative  Gastrointestinal: negative  Genitourinary: negative  Musculoskeletal: Back and joint pain  Skin and integumentary: Multiple skin bruises  Psychiatric: negative  Endocrine: negative  Neurological: negative, except for HPI  Hematologic/lymphatic: negative  Allergy/immunology: negative      Objective:     Visit Vitals  /80   Pulse 83   Temp 98.2 °F (36.8 °C) (Temporal)   Resp 18   Ht 5' 11\" (1.803 m)   Wt 179 lb (81.2 kg)   SpO2 98%   BMI 24.97 kg/m²       Physical Exam:  General:  appears well nourished in no acute distress  Neck: no carotid bruits  Lungs: clear to auscultation  Heart:  no murmurs, regular rate  Lower extremity: peripheral pulses palpable and no edema. Skin: intact, ever there is multiple skin lesions    Neurological exam:    Awake, alert, oriented to person, place and time  Recent and remote memory were normal  Attention and concentration were intact  Language was intact. There was no aphasia  Speech: no dysarthria  Fund of knowledge was preserved    Cranial nerves:   II-XII were tested    Perrrla  Visual fields were full  Eomi, no evidence of nystagmus  Facial sensation:  normal and symmetric  Facial motor: normal and symmetric  Hearing is diminished. unchanged  SCM strength intact  Tongue: midline without fasciculations    Motor:    Tone normal    No evidence of fasciculations    Strength testing:   deltoid triceps biceps Wrist ext. Wrist flex. intrinsics Hip flex. Hip ext. Knee ext.  Knee flex Dorsi flex Plantar flex   Right 5 5 5 5 5 5 5 5 5 5 5 5   Left 5 5 5 5 5 5 5 5 5 5 5 5         Sensory:  Upper extremity: intact to pp, light touch, and vibration > 10 seconds  Lower extremity: His vibration is absent at his toes and present for only 2 seconds at his ankles.  It is present for 6 seconds at his knees.  This is unchanged.  His pinprick is decreased to mid shin.      Reflexes:    Right Left  Biceps  2 2  Triceps 2 2  Brachiorad. 2 2  Patella  3 3  Achilles 1 1    Plantar response:  flexor bilaterally      Cerebellar testing:  no tremor apparent, finger/nose and frank were intact    Romberg: Present    Gait: steady however wide-based.  He is unable to tandem gait.  He also could not walk on his toes or heels.    Labs:     Labs pertinent to his neuropathy were checked in the past:  This includes a normal vitamin B12 level.  A normal serum immunofixation.  A normal methylmalonic acid.  A normal hemoglobin A1c.  Normal liver functioning  Normal free T4  Sjogren's testing was normal  Normal CARSON        Assessment and Plan:   This is a pleasant 75-year-old gentleman with multiple problems including lumbar degeneration which is impacting his overall neurological health who presents with difficulty with balance and leg stiffness.  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   Examination is relatively unchanged from prior visit 6 months ago    1. Sensory motor axonal neuropathy    He did have significant serology which was performed which did not provide any clear etiology for his neuropathy.  This does fall in the category of idiopathic.  I did explain this to him at length today.  His lumbar spine disease does contribute some to his overall symptoms of sensory loss.    We did talk about medication that can help with neuropathic  pain but at this time he only has symptoms of numbness. Given this, we will hold off on any medication. He is content with this. If he does develop neuropathic pain, he will notify me and we can look into starting a medication    I talked about sleeping with socks/heated blanket to help his symptoms at night. Neuropathy:  we reviewed the causes contributing to the neuropathy. We discussed the importance of exercise and activity. I also reviewed the importance of safety with ambulation and ways to prevents falls. I really stressed to him the importance of preventing falls. He will continue to use his hiking stick and I commended him for this. We also talked about the importance of trying to increase his daily activity. 2. Lumbar spine disease  He will continue with his stretching and core strengthening. There was no surgical intervention that was recommended at this time. 3. Coronavirus pandemic:  I did discuss with the patient at length the importance of social distancing and proper hygiene especially during these times. The patient is at a higher risk of having a more severe course of the disease and needs to follow all CDC recommendations. The patient acknowledges. I did encourage him to get the vaccine when he is able. The patient should return to clinic in 6 months    Renewed medication: none today    I spent  35    minutes with the patient  with over 50 % of the time counseling and coordinating the care plan in regards to the diagnosis, diagnostic testing, and treatment plan. The patient had the ability to ask questions and all questions were answered.          Signed By:  Mahnaz Tyson DO FAAN    January 11, 2021

## 2021-07-23 NOTE — PROGRESS NOTES
Neurology Note    Patient ID:  Danuta Hooker  [id number]  67 y.o.  1945      Date of Consultation:  July 26, 2021      Subjective: my feet are still numb       History of Present Illness:   Danuta Henderson is a 67 y.o. male who returns to the neurology clinic at [facility] for an evaluation. He was last seen in clinic on January 11, 2021. Please see my history of present illness, examination, and treatment plan from that day. He was being followed for an idiopathic sensorimotor axonal neuropathy. Since that time, he feels that the neuropathy is about the same. Does not feel that it is any worse. The numbness has been stable. He does continue to have a predominantly in his toes and continues to wear his socks more often. Fortunately, he has not had any falls since he was last here. He does continue to use his hiking stick when outside. He did have 1 syncopal event. Afterwards, he did have a significant cardiac work-up including an echocardiogram, monitoring, and a stress test which were all normal.  He was told this was related to dehydration and he needed to increase his fluid intake daily. Since doing that, there has been no new episodes of syncope and he has been feeling well. He does feel that his legs are heavier when walking. He is noticing more stiffness in his back and is now taking Aleve daily. He does notice that if he does not take Aleve 1 day, the symptoms are worse. Still has dermatitis. His dermatologist is considering chemotherapy.       Past Medical History:   Diagnosis Date    Arthritis     back,neck,shoulders    Arthritis     CAD (coronary artery disease)     per 10/2011 cardiology note    Cancer [facility]) Oct. 2011    prostate    Cardiomyopathy, alcoholic ([facility] [address]     per 87/4838 cardiology note    Essential hypertension     Hypercholesteremia     Hypertension     Other ill-defined conditions(799.89)     elevated cholesterol Past Surgical History:   Procedure Laterality Date    COLONOSCOPY N/A 11/8/2016    COLONOSCOPY performed by Alma Valdez MD at [facility]"" ENDOSCOPY    HX HEENT      tonsillectomy    HX ORTHOPAEDIC      left ankle plate & screws    HX OTHER SURGICAL      colonoscopy    HX PROSTATE SURGERY      HX PROSTATECTOMY  10/19/11    ROBOTIC ASSISTED LAPAROSCOPIC PROSTATECTOMY WITH LEFT PELVIC LYMPH NODE DISSECTION    HI CARDIAC SURG PROCEDURE UNLIST      cardiac cath x2 normal,2003    HI PROSTATE BIOPSY, NEEDLE, SATURATION SAMPLING      08/17/2011        Family History   Problem Relation Age of Onset    Seizures Mother     Stroke Mother         Social History     Tobacco Use    Smoking status: Never Smoker    Smokeless tobacco: Never Used   Substance Use Topics    Alcohol use: Yes        Allergies   Allergen Reactions    Adhesive Itching     bandaide causes itching,irritates skin  (9/1/15 - patient states that bandaids only cause irritation if left on for long period. Tested negative for latex allergy)        Prior to Admission medications    Medication Sig Start Date End Date Taking? Authorizing Provider   naproxen (NAPROSYN) 500 mg tablet Take 1 Tab by mouth every twelve (12) hours as needed for Pain. 1/2/21  Yes Laura Waggoner MD   methocarbamoL (Robaxin) 500 mg tablet Take 1 Tab by mouth four (4) times daily. 1/2/21  Yes Laura Waggoner MD   lidocaine 4 % patch 1 Patch by TransDERmal route every twelve (12) hours every twelve (12) hours. 1/2/21  Yes Laura Waggoner MD   losartan (COZAAR) 50 mg tablet Take  by mouth daily. Yes Provider, Historical   amLODIPine (NORVASC) 5 mg tablet Take 5 mg by mouth daily. Yes Provider, Historical   celecoxib (CELEBREX) 200 mg capsule Take  by mouth daily. Yes Provider, Historical   omega 3-dha-epa-fish oil (FISH OIL) 100-160-1,000 mg cap Take  by mouth daily.    Yes Provider, Historical   HYDROcodone-acetaminophen (NORCO) 5-325 mg per tablet Take 1 Tab by mouth every four (4) hours as needed for Pain. Max Daily Amount: 6 Tabs. 6/4/16  Yes Ravinder Potter NP   bisacodyl (DULCOLAX) 5 mg EC tablet Take 2 Tabs by mouth daily as needed for Constipation. 6/17/12  Yes Patria Jacob PA-C   multivitamins-minerals-lutein (CENTRUM SILVER) Tab Take  by mouth daily. Yes Other, MD Sheila   sodium chloride (OCEAN) 0.65 % nasal spray 1 Spray by Nasal route as needed. Yes Provider, Historical   carvedilol (COREG) 25 mg tablet Take 25 mg by mouth two (2) times daily (with meals). Yes Provider, Historical   simvastatin (ZOCOR) 20 mg tablet Take 20 mg by mouth nightly. Yes Provider, Historical   hydrochlorothiazide (HYDRODIURIL) 25 mg tablet Take 25 mg by mouth daily. Yes Provider, Historical       Review of Systems:    General, constitutional: negative  Eyes, vision: negative  Ears, nose, throat: negative  Cardiovascular, heart: negative  Respiratory: negative  Gastrointestinal: negative  Genitourinary: negative  Musculoskeletal: Back and joint pain  Skin and integumentary: Multiple skin bruises  Psychiatric: negative  Endocrine: negative  Neurological: negative, except for HPI  Hematologic/lymphatic: negative  Allergy/immunology: negative      Objective:     Visit Vitals  /70 (BP 1 Location: Right arm, BP Patient Position: Sitting, BP Cuff Size: Adult)   Pulse 81   Resp 16   Wt 176 lb (79.8 kg)   SpO2 96%   BMI 24.55 kg/m²       Physical Exam:  General:  appears well nourished in no acute distress  Neck: no carotid bruits  Lungs: clear to auscultation  Heart:  no murmurs, regular rate  Lower extremity: peripheral pulses palpable and no edema. Skin: intact, ever there is multiple skin lesions    Neurological exam:    Awake, alert, oriented to person, place and time  Recent and remote memory were normal  Attention and concentration were intact  Language was intact.   There was no aphasia  Speech: no dysarthria  Fund of knowledge was preserved    Cranial nerves: II-XII were tested    Perla  Visual fields were full  Eomi, no evidence of nystagmus  Facial sensation:  normal and symmetric  Facial motor: normal and symmetric  Hearing is diminished. unchanged  SCM strength intact  Tongue: midline without fasciculations    Motor: Tone normal    No evidence of fasciculations    Strength testing:   deltoid triceps biceps Wrist ext. Wrist flex. intrinsics Hip flex. Hip ext. Knee ext. Knee flex Dorsi flex Plantar flex   Right 5 5 5 5 5 5 5 5 5 5 5 5   Left 5 5 5 5 5 5 5 5 5 5 5 5       Sensory:  Upper extremity: intact to pp, light touch, and vibration > 10 seconds  Lower extremity: His vibration is absent at his toes and present for only 2 seconds at his ankles. It is present for 6 seconds at his knees. This is unchanged. His pinprick is decreased to mid shin. Reflexes:    Right Left  Biceps  2 2  Triceps 2 2  Brachiorad. 2 2  Patella  3 3  Achilles 1 1    Plantar response:  flexor bilaterally      Cerebellar testing:  no tremor apparent, finger/nose and frank were intact    Romberg: Present    Gait: steady however wide-based. He is unable to tandem gait. Unchanged. Labs:     Labs pertinent to his neuropathy were checked in the past:  This includes a normal vitamin B12 level. A normal serum immunofixation. A normal methylmalonic acid. A normal hemoglobin A1c. Normal liver functioning  Normal free T4  Sjogren's testing was normal  Normal CARSON        Assessment and Plan: This is a pleasant 42-year-old gentleman with multiple problems including lumbar degeneration which is impacting his overall neurological health who presents with difficulty with balance and leg stiffness. 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 Examination is relatively unchanged from prior visit 6 months ago, however his gait is a bit more antalgic.     1. Sensory motor axonal neuropathy    He did have significant serology which was performed which did not provide any clear etiology for his neuropathy. This does fall in the category of idiopathic. We reviewed this again today. His lumbar spine disease does contribute some to his overall symptoms of sensory loss. We did talk about medication that can help with neuropathic pain but at this time he only has symptoms of numbness. Given this, we will hold off on any medication. He is content with this. If he does develop neuropathic pain, he will notify me and we can look into starting a medication    Neuropathy:  we reviewed the causes contributing to the neuropathy. We discussed the importance of exercise and activity. I also reviewed the importance of safety with ambulation and ways to prevents falls. I really stressed to him the importance of preventing falls. He will continue to use his hiking stick and I commended him for this. We also talked about the importance of trying to increase his daily activity. 2. Lumbar spine disease:  His gait suggests that this is a bit worse. He will continue with his stretching and core strengthening. There was no surgical intervention that was recommended at this time. He will continue with anti-inflammatories    3. Coronavirus pandemic:  He received his vaccine. The patient should return to clinic in 6 months  Renewed medication: none at this time    I spent  30  minutes on the day of the encounter preparing the office visit by reviewing medical records, obtaining a history, performing examination, counseling and educating the patient on diagnosis, documenting in the clinical medical record, and coordinating the care for the patient. The patient had the ability to ask questions and all questions were answered.           Signed By:  Antione Campos DO FAAN    July 26, 2021

## 2021-07-26 ENCOUNTER — OFFICE VISIT (OUTPATIENT)
Dept: NEUROLOGY | Age: 76
End: 2021-07-26
Payer: MEDICARE

## 2021-07-26 VITALS
SYSTOLIC BLOOD PRESSURE: 110 MMHG | BODY MASS INDEX: 24.55 KG/M2 | RESPIRATION RATE: 16 BRPM | DIASTOLIC BLOOD PRESSURE: 70 MMHG | HEART RATE: 81 BPM | WEIGHT: 176 LBS | OXYGEN SATURATION: 96 %

## 2021-07-26 DIAGNOSIS — G60.9 IDIOPATHIC PERIPHERAL NEUROPATHY: Primary | ICD-10-CM

## 2021-07-26 DIAGNOSIS — M51.36 LUMBAR DEGENERATIVE DISC DISEASE: ICD-10-CM

## 2021-07-26 PROCEDURE — G8420 CALC BMI NORM PARAMETERS: HCPCS | Performed by: PSYCHIATRY & NEUROLOGY

## 2021-07-26 PROCEDURE — 1101F PT FALLS ASSESS-DOCD LE1/YR: CPT | Performed by: PSYCHIATRY & NEUROLOGY

## 2021-07-26 PROCEDURE — 99214 OFFICE O/P EST MOD 30 MIN: CPT | Performed by: PSYCHIATRY & NEUROLOGY

## 2021-07-26 PROCEDURE — G8536 NO DOC ELDER MAL SCRN: HCPCS | Performed by: PSYCHIATRY & NEUROLOGY

## 2021-07-26 PROCEDURE — G8427 DOCREV CUR MEDS BY ELIG CLIN: HCPCS | Performed by: PSYCHIATRY & NEUROLOGY

## 2021-07-26 PROCEDURE — G8432 DEP SCR NOT DOC, RNG: HCPCS | Performed by: PSYCHIATRY & NEUROLOGY

## 2021-07-26 NOTE — LETTER
7/26/2021    Patient: Bertie Ormond. YOB: 1945   Date of Visit: 7/26/2021     Lon Cedeno MD  [address] [place] [address]  Via Fax: [phone]    Dear Lon Cedeno MD,      Thank you for referring Mr. Doreen Velez to [address] [place] [address] for evaluation. My notes for this consultation are attached. If you have questions, please do not hesitate to call me. I look forward to following your patient along with you.       Sincerely,    Marcin Guerra, DO

## 2022-01-28 NOTE — PROGRESS NOTES
Neurology Note    Patient ID:  Frank Torres  [id number]  67 y.o.  1945      Date of Consultation:  January 31, 2022      Subjective: my feet are still numb       History of Present Illness:   Frank Soler is a 67 y.o. male who returns to the neurology clinic at [facility] for an evaluation. He was last seen in clinic on July 26, 2021. Please see my history of present illness, examination, and treatment plan from that day. He was being followed for an idiopathic sensorimotor axonal neuropathy. Since that time, he feels that the neuropathy is about the same. He continues to have persistent numbness. There is no pain, burning, or tingling. He does not feel that the numbness is any worse than previously. It is predominantly in his toes. He has not had any falls since he was last here. He still does have a hiking stick which he will use outside. There have been no more syncopal events. He does continue to struggle with his dermatitis. He is being considered for different type of chemotherapy treatments for his dermatitis and has an appointment in February to further discuss this.     Past Medical History:   Diagnosis Date    Arthritis     back,neck,shoulders    Arthritis     CAD (coronary artery disease)     per 10/2011 cardiology note    Cancer [facility]) Oct. 2011    prostate    Cardiomyopathy, alcoholic ([facility] [address]     per 30/8728 cardiology note    Essential hypertension     Hypercholesteremia     Hypertension     Other ill-defined conditions(799.89)     elevated cholesterol        Past Surgical History:   Procedure Laterality Date    COLONOSCOPY N/A 11/8/2016    COLONOSCOPY performed by Chantale Gomez MD at [facility] ENDOSCOPY    HX HEENT      tonsillectomy    HX ORTHOPAEDIC      left ankle plate & screws    HX OTHER SURGICAL      colonoscopy    HX PROSTATE SURGERY      HX PROSTATECTOMY  10/19/11    ROBOTIC ASSISTED LAPAROSCOPIC PROSTATECTOMY WITH LEFT PELVIC LYMPH NODE DISSECTION    ID CARDIAC SURG PROCEDURE UNLIST      cardiac cath x2 normal,2003    ID PROSTATE BIOPSY, NEEDLE, SATURATION SAMPLING      08/17/2011        Family History   Problem Relation Age of Onset    Seizures Mother     Stroke Mother         Social History     Tobacco Use    Smoking status: Never Smoker    Smokeless tobacco: Never Used   Substance Use Topics    Alcohol use: Yes        Allergies   Allergen Reactions    Adhesive Itching     bandaide causes itching,irritates skin  (9/1/15 - patient states that bandaids only cause irritation if left on for long period. Tested negative for latex allergy)        Prior to Admission medications    Medication Sig Start Date End Date Taking? Authorizing Provider   clotrimazole (MYCELEX) 10 mg charline DISSOLVE 1 CHARLINE SLOWLY IN MOUTH 5 TIMES A DAY FOR 14 DAYS GENERIC FOR [facility] 12/2/21  Yes Provider, Historical   naproxen (NAPROSYN) 500 mg tablet Take 1 Tab by mouth every twelve (12) hours as needed for Pain. 1/2/21  Yes Johnette Cushing, MD   methocarbamoL (Robaxin) 500 mg tablet Take 1 Tab by mouth four (4) times daily. 1/2/21  Yes Johnette Cushing, MD   lidocaine 4 % patch 1 Patch by TransDERmal route every twelve (12) hours every twelve (12) hours. 1/2/21  Yes Johnette Cushing, MD   losartan (COZAAR) 50 mg tablet Take  by mouth daily. Yes Provider, Historical   amLODIPine (NORVASC) 5 mg tablet Take 5 mg by mouth daily. Yes Provider, Historical   celecoxib (CELEBREX) 200 mg capsule Take  by mouth daily. Yes Provider, Historical   omega 3-dha-epa-fish oil (FISH OIL) 100-160-1,000 mg cap Take  by mouth daily. Yes Provider, Historical   HYDROcodone-acetaminophen (NORCO) 5-325 mg per tablet Take 1 Tab by mouth every four (4) hours as needed for Pain. Max Daily Amount: 6 Tabs. 6/4/16  Yes Deborah Hair, NP   bisacodyl (DULCOLAX) 5 mg EC tablet Take 2 Tabs by mouth daily as needed for Constipation.  6/17/12  Yes Desiree Pino PA-C multivitamins-minerals-lutein (CENTRUM SILVER) Tab Take  by mouth daily. Yes Other, MD Sheila   sodium chloride (OCEAN) 0.65 % nasal spray 1 Spray by Nasal route as needed. Yes Provider, Historical   carvedilol (COREG) 25 mg tablet Take 25 mg by mouth two (2) times daily (with meals). Yes Provider, Historical   simvastatin (ZOCOR) 20 mg tablet Take 20 mg by mouth nightly. Yes Provider, Historical   hydrochlorothiazide (HYDRODIURIL) 25 mg tablet Take 25 mg by mouth daily. Yes Provider, Historical       Review of Systems:    General, constitutional: negative  Eyes, vision: negative  Ears, nose, throat: negative  Cardiovascular, heart: negative  Respiratory: negative  Gastrointestinal: negative  Genitourinary: negative  Musculoskeletal: Back and joint pain  Skin and integumentary: Multiple skin bruises  Psychiatric: negative  Endocrine: negative  Neurological: negative, except for HPI  Hematologic/lymphatic: negative  Allergy/immunology: negative      Objective:     Visit Vitals  /72 (BP 1 Location: Left arm, BP Patient Position: Sitting, BP Cuff Size: Adult)   Pulse 76   Resp 16   Wt 175 lb 6.4 oz (79.6 kg)   SpO2 99%   BMI 24.46 kg/m²       Physical Exam:  General:  appears well nourished in no acute distress  Neck: no carotid bruits  Lungs: clear to auscultation  Heart:  no murmurs, regular rate  Lower extremity: peripheral pulses palpable and no edema. Skin: intact, ever there is multiple skin lesions    Neurological exam:    Awake, alert, oriented to person, place and time  Recent and remote memory were normal  Attention and concentration were intact  Language was intact. There was no aphasia  Speech: no dysarthria  Fund of knowledge was preserved    Cranial nerves:   II-XII were tested    Perrrla  Visual fields were full  Eomi, no evidence of nystagmus  Facial sensation:  normal and symmetric  Facial motor: normal and symmetric  Hearing is diminished.  unchanged  SCM strength intact  Tongue: midline without fasciculations    Motor: Tone normal    No evidence of fasciculations    Strength testing:   deltoid triceps biceps Wrist ext. Wrist flex. intrinsics Hip flex. Hip ext. Knee ext. Knee flex Dorsi flex Plantar flex   Right 5 5 5 5 5 5 5 5 5 5 5 5   Left 5 5 5 5 5 5 5 5 5 5 5 5       Sensory:  Upper extremity: intact to pp, light touch, and vibration > 10 seconds  Lower extremity: His vibration is absent at his toes and present for only 2 seconds at his ankles. It is present for 6 seconds at his knees. This is identical to his last visit 7 months ago. his pinprick is decreased to mid shin. Reflexes:    Right Left  Biceps  2 2  Triceps 2 2  Brachiorad. 2 2  Patella  2 2  Achilles 1 1    Plantar response:  flexor bilaterally      Cerebellar testing:  no tremor apparent, finger/nose and frank were intact    Romberg: Present    Gait: steady however wide-based. He is unable to tandem gait. Unchanged. Labs:     Labs pertinent to his neuropathy were checked in the past:  This includes a normal vitamin B12 level. A normal serum immunofixation. A normal methylmalonic acid. A normal hemoglobin A1c. Normal liver functioning  Normal free T4  Sjogren's testing was normal  Normal CARSON        Assessment and Plan: This is a pleasant 67year-old gentleman with multiple problems including dermatitis and lumbar degeneration which is impacting his overall neurological health who presents with difficulty with balance and leg stiffness. 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 Examination is relatively unchanged from prior visit 7 months ago. 1. Sensory motor axonal neuropathy    He did have significant serology which was performed which did not provide any clear etiology for his neuropathy. This does fall in the category of idiopathic. We reviewed this again today.     His lumbar spine disease does contribute some to his overall symptoms of sensory loss. We did talk about medication that can help with neuropathic pain, but at this time he only has symptoms of numbness. Given this, we will hold off on any medication. He is content with this. If he does develop neuropathic pain, he will notify me and we can look into starting a medication    Neuropathy:  we reviewed the causes contributing to the neuropathy. We discussed the importance of exercise and activity. I also reviewed the importance of safety with ambulation and ways to prevents falls. I really stressed to him the importance of preventing falls. He will continue to use his hiking stick and I commended him for this. We also talked about the importance of trying to increase his daily activity. 2. Lumbar spine disease:  He will continue with his stretching and core strengthening. There was no surgical intervention recommended previously  He will continue with anti-inflammatories    3. Dermatitis:  He will continue to follow closely with his primary care doctor and his dermatologist.       The patient should return to clinic in 9 months  Renewed medication: none at this time    I spent  34  minutes on the day of the encounter preparing the office visit by reviewing medical records, obtaining a history, performing examination, counseling and educating the patient on diagnosis, documenting in the clinical medical record, and coordinating the care for the patient. The patient had the ability to ask questions and all questions were answered.           Signed By:  Taylor Norman DO FAAN    January 31, 2022

## 2022-01-31 ENCOUNTER — OFFICE VISIT (OUTPATIENT)
Dept: NEUROLOGY | Age: 77
End: 2022-01-31
Payer: MEDICARE

## 2022-01-31 VITALS
OXYGEN SATURATION: 99 % | WEIGHT: 175.4 LBS | SYSTOLIC BLOOD PRESSURE: 138 MMHG | DIASTOLIC BLOOD PRESSURE: 72 MMHG | HEART RATE: 76 BPM | RESPIRATION RATE: 16 BRPM | BODY MASS INDEX: 24.46 KG/M2

## 2022-01-31 DIAGNOSIS — G60.9 IDIOPATHIC PERIPHERAL NEUROPATHY: Primary | ICD-10-CM

## 2022-01-31 DIAGNOSIS — G62.9 NEUROPATHY: ICD-10-CM

## 2022-01-31 DIAGNOSIS — M51.36 LUMBAR DEGENERATIVE DISC DISEASE: ICD-10-CM

## 2022-01-31 PROCEDURE — G8420 CALC BMI NORM PARAMETERS: HCPCS | Performed by: PSYCHIATRY & NEUROLOGY

## 2022-01-31 PROCEDURE — G8510 SCR DEP NEG, NO PLAN REQD: HCPCS | Performed by: PSYCHIATRY & NEUROLOGY

## 2022-01-31 PROCEDURE — G8536 NO DOC ELDER MAL SCRN: HCPCS | Performed by: PSYCHIATRY & NEUROLOGY

## 2022-01-31 PROCEDURE — G8427 DOCREV CUR MEDS BY ELIG CLIN: HCPCS | Performed by: PSYCHIATRY & NEUROLOGY

## 2022-01-31 PROCEDURE — 99214 OFFICE O/P EST MOD 30 MIN: CPT | Performed by: PSYCHIATRY & NEUROLOGY

## 2022-01-31 PROCEDURE — 1101F PT FALLS ASSESS-DOCD LE1/YR: CPT | Performed by: PSYCHIATRY & NEUROLOGY

## 2022-01-31 RX ORDER — CLOTRIMAZOLE 10 MG/1
LOZENGE ORAL; TOPICAL
COMMUNITY
Start: 2021-12-02 | End: 2022-08-23

## 2022-01-31 NOTE — LETTER
1/31/2022    Patient: Stephanie Canales. YOB: 1945   Date of Visit: 1/31/2022     Samy Painting MD  [address] [place] [address]  Via Fax: [phone]    Dear Samy Painting MD,      Thank you for referring Mr. Bandar Batista to [address] [place] [address] for evaluation. My notes for this consultation are attached. If you have questions, please do not hesitate to call me. I look forward to following your patient along with you.       Sincerely,    Marcin Guerra, DO

## 2022-08-03 ENCOUNTER — HOSPITAL ENCOUNTER (OUTPATIENT)
Dept: GENERAL RADIOLOGY | Age: 77
Discharge: HOME | End: 2022-08-03
Payer: MEDICARE

## 2022-08-03 ENCOUNTER — TRANSCRIBE ORDER (OUTPATIENT)
Dept: REGISTRATION | Age: 77
End: 2022-08-03

## 2022-08-03 DIAGNOSIS — R06.02 SHORTNESS OF BREATH: ICD-10-CM

## 2022-08-03 DIAGNOSIS — R06.02 SHORTNESS OF BREATH: Primary | ICD-10-CM

## 2022-08-03 PROCEDURE — 71046 X-RAY EXAM CHEST 2 VIEWS: CPT

## 2022-08-12 ENCOUNTER — APPOINTMENT (OUTPATIENT)
Dept: GENERAL RADIOLOGY | Age: 77
End: 2022-08-12
Attending: EMERGENCY MEDICINE
Payer: MEDICARE

## 2022-08-12 ENCOUNTER — APPOINTMENT (OUTPATIENT)
Dept: CT IMAGING | Age: 77
End: 2022-08-12
Attending: EMERGENCY MEDICINE
Payer: MEDICARE

## 2022-08-12 ENCOUNTER — HOSPITAL ENCOUNTER (EMERGENCY)
Age: 77
Discharge: ANOTHER HOSPITAL | End: 2022-08-13
Attending: EMERGENCY MEDICINE
Payer: MEDICARE

## 2022-08-12 ENCOUNTER — HOSPITAL ENCOUNTER (EMERGENCY)
Age: 77
Discharge: HOME | End: 2022-08-12
Attending: STUDENT IN AN ORGANIZED HEALTH CARE EDUCATION/TRAINING PROGRAM | Admitting: STUDENT IN AN ORGANIZED HEALTH CARE EDUCATION/TRAINING PROGRAM
Payer: MEDICARE

## 2022-08-12 VITALS
DIASTOLIC BLOOD PRESSURE: 69 MMHG | HEART RATE: 74 BPM | OXYGEN SATURATION: 100 % | RESPIRATION RATE: 16 BRPM | WEIGHT: 172.4 LBS | SYSTOLIC BLOOD PRESSURE: 155 MMHG | HEIGHT: 71 IN | BODY MASS INDEX: 24.14 KG/M2 | TEMPERATURE: 97.3 F

## 2022-08-12 DIAGNOSIS — R21 RASH: Primary | ICD-10-CM

## 2022-08-12 DIAGNOSIS — R55 SYNCOPE AND COLLAPSE: ICD-10-CM

## 2022-08-12 DIAGNOSIS — L30.9 DERMATITIS: Primary | ICD-10-CM

## 2022-08-12 DIAGNOSIS — T14.8XXA MULTIPLE SKIN TEARS: ICD-10-CM

## 2022-08-12 LAB
ALBUMIN SERPL-MCNC: 2.2 G/DL (ref 3.5–5)
ALBUMIN SERPL-MCNC: 3.3 G/DL (ref 3.5–5)
ALBUMIN/GLOB SERPL: 1.1 {RATIO} (ref 1.1–2.2)
ALBUMIN/GLOB SERPL: 1.2 {RATIO} (ref 1.1–2.2)
ALP SERPL-CCNC: 58 U/L (ref 45–117)
ALP SERPL-CCNC: 84 U/L (ref 45–117)
ALT SERPL-CCNC: 14 U/L (ref 12–78)
ALT SERPL-CCNC: 21 U/L (ref 12–78)
ANION GAP SERPL CALC-SCNC: 8 MMOL/L (ref 5–15)
ANION GAP SERPL CALC-SCNC: 9 MMOL/L (ref 5–15)
APPEARANCE UR: CLEAR
AST SERPL-CCNC: 14 U/L (ref 15–37)
AST SERPL-CCNC: 22 U/L (ref 15–37)
BASOPHILS # BLD: 0 K/UL (ref 0–0.1)
BASOPHILS NFR BLD: 0 % (ref 0–1)
BILIRUB SERPL-MCNC: 0.3 MG/DL (ref 0.2–1)
BILIRUB SERPL-MCNC: 0.4 MG/DL (ref 0.2–1)
BILIRUB UR QL: NEGATIVE
BUN SERPL-MCNC: 11 MG/DL (ref 6–20)
BUN SERPL-MCNC: 9 MG/DL (ref 6–20)
BUN/CREAT SERPL: 15 (ref 12–20)
BUN/CREAT SERPL: 17 (ref 12–20)
CALCIUM SERPL-MCNC: 6 MG/DL (ref 8.5–10.1)
CALCIUM SERPL-MCNC: 8.5 MG/DL (ref 8.5–10.1)
CHLORIDE SERPL-SCNC: 110 MMOL/L (ref 97–108)
CHLORIDE SERPL-SCNC: 97 MMOL/L (ref 97–108)
CO2 SERPL-SCNC: 17 MMOL/L (ref 21–32)
CO2 SERPL-SCNC: 23 MMOL/L (ref 21–32)
COLOR UR: NORMAL
CREAT SERPL-MCNC: 0.53 MG/DL (ref 0.7–1.3)
CREAT SERPL-MCNC: 0.72 MG/DL (ref 0.7–1.3)
DIFFERENTIAL METHOD BLD: ABNORMAL
EOSINOPHIL # BLD: 0.2 K/UL (ref 0–0.4)
EOSINOPHIL NFR BLD: 2 % (ref 0–7)
ERYTHROCYTE [DISTWIDTH] IN BLOOD BY AUTOMATED COUNT: 14 % (ref 11.5–14.5)
GLOBULIN SER CALC-MCNC: 2 G/DL (ref 2–4)
GLOBULIN SER CALC-MCNC: 2.8 G/DL (ref 2–4)
GLUCOSE SERPL-MCNC: 125 MG/DL (ref 65–100)
GLUCOSE SERPL-MCNC: 125 MG/DL (ref 65–100)
GLUCOSE UR STRIP.AUTO-MCNC: NEGATIVE MG/DL
HCT VFR BLD AUTO: 27.8 % (ref 36.6–50.3)
HGB BLD-MCNC: 9.7 G/DL (ref 12.1–17)
HGB UR QL STRIP: NEGATIVE
IMM GRANULOCYTES # BLD AUTO: 0 K/UL (ref 0–0.04)
IMM GRANULOCYTES NFR BLD AUTO: 0 % (ref 0–0.5)
KETONES UR QL STRIP.AUTO: NEGATIVE MG/DL
LEUKOCYTE ESTERASE UR QL STRIP.AUTO: NEGATIVE
LYMPHOCYTES # BLD: 1.6 K/UL (ref 0.8–3.5)
LYMPHOCYTES NFR BLD: 18 % (ref 12–49)
MCH RBC QN AUTO: 32.2 PG (ref 26–34)
MCHC RBC AUTO-ENTMCNC: 34.9 G/DL (ref 30–36.5)
MCV RBC AUTO: 92.4 FL (ref 80–99)
METAMYELOCYTES NFR BLD MANUAL: 1 %
MONOCYTES # BLD: 0.5 K/UL (ref 0–1)
MONOCYTES NFR BLD: 5 % (ref 5–13)
MYELOCYTES NFR BLD MANUAL: 2 %
NEUTS SEG # BLD: 6.5 K/UL (ref 1.8–8)
NEUTS SEG NFR BLD: 72 % (ref 32–75)
NITRITE UR QL STRIP.AUTO: NEGATIVE
NRBC # BLD: 0 K/UL (ref 0–0.01)
NRBC BLD-RTO: 0 PER 100 WBC
PH UR STRIP: 8 [PH] (ref 5–8)
PLATELET # BLD AUTO: 142 K/UL (ref 150–400)
PMV BLD AUTO: 9.2 FL (ref 8.9–12.9)
POTASSIUM SERPL-SCNC: 3 MMOL/L (ref 3.5–5.1)
POTASSIUM SERPL-SCNC: 4.3 MMOL/L (ref 3.5–5.1)
PROT SERPL-MCNC: 4.2 G/DL (ref 6.4–8.2)
PROT SERPL-MCNC: 6.1 G/DL (ref 6.4–8.2)
PROT UR STRIP-MCNC: NEGATIVE MG/DL
RBC # BLD AUTO: 3.01 M/UL (ref 4.1–5.7)
RBC MORPH BLD: ABNORMAL
SODIUM SERPL-SCNC: 128 MMOL/L (ref 136–145)
SODIUM SERPL-SCNC: 136 MMOL/L (ref 136–145)
SP GR UR REFRACTOMETRY: 1.01
TROPONIN-HIGH SENSITIVITY: 4 NG/L (ref 0–76)
UR CULT HOLD, URHOLD: NORMAL
UROBILINOGEN UR QL STRIP.AUTO: 0.2 EU/DL (ref 0.2–1)
WBC # BLD AUTO: 9 K/UL (ref 4.1–11.1)

## 2022-08-12 PROCEDURE — 84484 ASSAY OF TROPONIN QUANT: CPT

## 2022-08-12 PROCEDURE — 80053 COMPREHEN METABOLIC PANEL: CPT

## 2022-08-12 PROCEDURE — 70450 CT HEAD/BRAIN W/O DYE: CPT

## 2022-08-12 PROCEDURE — 71045 X-RAY EXAM CHEST 1 VIEW: CPT

## 2022-08-12 PROCEDURE — 36415 COLL VENOUS BLD VENIPUNCTURE: CPT

## 2022-08-12 PROCEDURE — 73070 X-RAY EXAM OF ELBOW: CPT

## 2022-08-12 PROCEDURE — 72125 CT NECK SPINE W/O DYE: CPT

## 2022-08-12 PROCEDURE — 93005 ELECTROCARDIOGRAM TRACING: CPT

## 2022-08-12 PROCEDURE — 99285 EMERGENCY DEPT VISIT HI MDM: CPT

## 2022-08-12 PROCEDURE — 85025 COMPLETE CBC W/AUTO DIFF WBC: CPT

## 2022-08-12 PROCEDURE — 81003 URINALYSIS AUTO W/O SCOPE: CPT

## 2022-08-12 PROCEDURE — 99283 EMERGENCY DEPT VISIT LOW MDM: CPT | Performed by: STUDENT IN AN ORGANIZED HEALTH CARE EDUCATION/TRAINING PROGRAM

## 2022-08-12 RX ORDER — CLOBETASOL PROPIONATE 0.5 MG/G
OINTMENT TOPICAL 2 TIMES DAILY
Qty: 30 G | Refills: 0 | Status: SHIPPED | OUTPATIENT
Start: 2022-08-12 | End: 2022-08-23

## 2022-08-12 NOTE — DISCHARGE INSTRUCTIONS
Please begin using the prescribed steroid cream.  It is important to leave an area of affected skin clear of all steroids so it can be biopsied next week. Please call [facility] dermatology to schedule appointment for next week.

## 2022-08-12 NOTE — ED PROVIDER NOTES
EMERGENCY DEPARTMENT HISTORY AND PHYSICAL EXAM      Date: 8/12/2022  Patient Name: Esau Gallo History of Presenting Illness     Chief Complaint   Patient presents with    Rash     Pt ambulatory into triage with a cc of rash on chest x 3 months; pt was seen at [facility] and diagnosed with squamous cell carcinoma and states the medications given are not helping and wants to be evaluated again       History Provided By: Patient    HPI: Esau Gallo, 67 y.o. male with a past medical history significant for hypertension, dermatitis presents to the ED with cc of rash. He notes he has been dealing with this med rash for many months. He is on multiple dermatological visits and biopsies. He has been using 3 different creams and antihistamines with little relief. He presents today with worsening pain and inability to sleep secondary to the pain. He notes the pain is worse at night. Located on his chest and back and arms. It is burning and itchy. It is 7 out of 10. He denies fever but does have some flushing early in the morning. He denies any mouth sores. The creams and medicines do not seem to be helping. He is unable to get an appoint with his dermatologist until he talk with his primary care physician who is on vacation. There are no other complaints, changes, or physical findings at this time. PCP: Ayesha Mena MD    No current facility-administered medications on file prior to encounter. Current Outpatient Medications on File Prior to Encounter   Medication Sig Dispense Refill    clotrimazole (MYCELEX) 10 mg charline DISSOLVE 1 CHARLINE SLOWLY IN MOUTH 5 TIMES A DAY FOR 14 DAYS GENERIC FOR MYCELEX      naproxen (NAPROSYN) 500 mg tablet Take 1 Tab by mouth every twelve (12) hours as needed for Pain. 20 Tab 0    methocarbamoL (Robaxin) 500 mg tablet Take 1 Tab by mouth four (4) times daily.  20 Tab 0    lidocaine 4 % patch 1 Patch by TransDERmal route every twelve (12) hours every twelve (12) hours. 10 Patch 0    losartan (COZAAR) 50 mg tablet Take  by mouth daily. amLODIPine (NORVASC) 5 mg tablet Take 5 mg by mouth daily. celecoxib (CELEBREX) 200 mg capsule Take  by mouth daily. omega 3-dha-epa-fish oil (FISH OIL) 100-160-1,000 mg cap Take  by mouth daily. HYDROcodone-acetaminophen (NORCO) 5-325 mg per tablet Take 1 Tab by mouth every four (4) hours as needed for Pain. Max Daily Amount: 6 Tabs. 20 Tab 0    bisacodyl (DULCOLAX) 5 mg EC tablet Take 2 Tabs by mouth daily as needed for Constipation. 30 Tab 1    multivitamins-minerals-lutein (CENTRUM SILVER) Tab Take  by mouth daily. sodium chloride (OCEAN) 0.65 % nasal spray 1 Spray by Nasal route as needed. carvedilol (COREG) 25 mg tablet Take 25 mg by mouth two (2) times daily (with meals). simvastatin (ZOCOR) 20 mg tablet Take 20 mg by mouth nightly. hydrochlorothiazide (HYDRODIURIL) 25 mg tablet Take 25 mg by mouth daily.          Past History     Past Medical History:  Past Medical History:   Diagnosis Date    Arthritis     back,neck,shoulders    Arthritis     CAD (coronary artery disease)     per 10/2011 cardiology note    Cancer [facility]) Oct. 2011    prostate    Cardiomyopathy, alcoholic ([facility] [address]     per 77/5320 cardiology note    Essential hypertension     Hypercholesteremia     Hypertension     Other ill-defined conditions(799.89)     elevated cholesterol       Past Surgical History:  Past Surgical History:   Procedure Laterality Date    COLONOSCOPY N/A 11/8/2016    COLONOSCOPY performed by Calvin Abdalla MD at [facility] ENDOSCOPY    HX HEENT      tonsillectomy    HX ORTHOPAEDIC      left ankle plate & screws    HX OTHER SURGICAL      colonoscopy    HX PROSTATE SURGERY      HX PROSTATECTOMY  10/19/11    ROBOTIC ASSISTED LAPAROSCOPIC PROSTATECTOMY WITH LEFT PELVIC LYMPH NODE DISSECTION    [place] CARDIAC SURG PROCEDURE UNLIST      cardiac cath x2 normal,2003    [place] PROSTATE BIOPSY, NEEDLE, SATURATION SAMPLING 08/17/2011       Family History:  Family History   Problem Relation Age of Onset    Seizures Mother     Stroke Mother        Social History:  Social History     Tobacco Use    Smoking status: Never    Smokeless tobacco: Never   Substance Use Topics    Alcohol use: Yes    Drug use: Never       Allergies: Allergies   Allergen Reactions    Adhesive Itching     bandaide causes itching,irritates skin  (9/1/15 - patient states that bandaids only cause irritation if left on for long period. Tested negative for latex allergy)         Review of Systems   Review of Systems   Constitutional:  Negative for fever. HENT:  Negative for congestion. Eyes:  Negative for visual disturbance. Respiratory:  Negative for cough, shortness of breath and wheezing. Cardiovascular:         Skin on his chest hurts but denies deep chest pain   Gastrointestinal:  Negative for diarrhea and nausea. Genitourinary:  Negative for dysuria. Musculoskeletal:  Negative for back pain and joint swelling. Skin:  Positive for rash. Neurological:  Negative for headaches. Hematological:  Does not bruise/bleed easily. Physical Exam   Physical Exam  Vitals and nursing note reviewed. Constitutional:       Appearance: Normal appearance. HENT:      Head: Normocephalic and atraumatic. Nose: Nose normal.      Mouth/Throat:      Mouth: Mucous membranes are moist.      Pharynx: Oropharynx is clear. No oropharyngeal exudate or posterior oropharyngeal erythema. Eyes:      Extraocular Movements: Extraocular movements intact. Pupils: Pupils are equal, round, and reactive to light. Cardiovascular:      Rate and Rhythm: Normal rate and regular rhythm. Pulmonary:      Effort: Pulmonary effort is normal.      Breath sounds: Normal breath sounds. Abdominal:      General: Abdomen is flat. Palpations: Abdomen is soft. Musculoskeletal:         General: No swelling or deformity. Normal range of motion.       Cervical back: Normal range of motion. Skin:     Capillary Refill: Capillary refill takes less than 2 seconds. Comments: Diffuse, patchy maculopapular blanching rash with some plaques on the back. The rash extends from his abdomen down bilateral arms and down back diminishing more distally. No purulence noted   Neurological:      General: No focal deficit present. Mental Status: He is alert and oriented to person, place, and time. Psychiatric:         Mood and Affect: Mood normal.         Behavior: Behavior normal.       Diagnostic Study Results     Labs -   No results found for this or any previous visit (from the past 24 hour(s)). Radiologic Studies -   No orders to display     CT Results  (Last 48 hours)      None          CXR Results  (Last 48 hours)      None              Medical Decision Making   I am the first provider for this patient. I reviewed the vital signs, available nursing notes, past medical history, past surgical history, family history and social history. Vital Signs-Reviewed the patient's vital signs. Patient Vitals for the past 12 hrs:   Temp Pulse Resp BP SpO2   08/12/22 1113 97.3 °F (36.3 °C) 74 16 (!) 155/69 100 %       Records Reviewed: Nursing records and medical records reviewed    MDM:  Dermatitis, allergic dermatitis, contact dermatitis, malignancy, autoimmune dermatitis    Provider Notes (Medical Decision Making):   8year-old man with a long standing rash presents with worsening of his rash due to pain and pruritus. Vital signs are stable upon arrival.  Exam does show a diffuse maculopapular rash that does emile and some plaques on the posterior aspect of his back. He is been using multiple creams and medications prescribed by his dermatologist with little relief. We will attempt to reach out to his dermatologist for additional recommendations. Does not appear to be infected at this time. ED Course:   Initial assessment performed.  The patients presenting problems have been discussed, and they are in agreement with the care plan formulated and outlined with them. I have encouraged them to ask questions as they arise throughout their visit. Discussed case with [facility] dermatology. While they cannot give recommendations over the phone without seeing the patient, appropriately, agreed to seeing patient next week. We will start patient on topical steroids and refer back to [facility] dermatology. Discussed leaving a spot clear with no steroids so they can biopsy. Disposition:  Discharge Note:  1:19 PM  The patient has been re-evaluated and is ready for discharge. Reviewed available results with patient. Counseled patient on diagnosis and care plan. Patient has expressed understanding, and all questions have been answered. Patient agrees with plan and agrees to follow up as recommended, or to return to the ED if their symptoms worsen. Discharge instructions have been provided and explained to the patient, along with reasons to return to the ED. DISCHARGE PLAN:  1. Current Discharge Medication List        START taking these medications    Details   clobetasoL (TEMOVATE) 0.05 % ointment Apply  to affected area two (2) times a day. Qty: 30 g, Refills: 0  Start date: 8/12/2022           2. Follow-up Information       Follow up With Specialties Details Why Contact Info    [facility]  Schedule an appointment as soon as possible for a visit   [address] [phone]  [phone]    [facility] [place] [facility] EMERGENCY DEPT Emergency Medicine  If symptoms worsen [address] [place] [address]  [address] [facility]  [phone]          3. Return to ED if worse     Diagnosis     Clinical Impression:   1. Dermatitis        Attestations:    Natalio Murguia MD    Please note that this dictation was completed with Tomveyi Bidamon, the [employer] voice recognition software.   Quite often unanticipated grammatical, syntax, homophones, and other interpretive errors are inadvertently transcribed by the computer software. Please disregard these errors. Please excuse any errors that have escaped final proofreading. Thank you.

## 2022-08-12 NOTE — ED PROVIDER NOTES
EMERGENCY DEPARTMENT HISTORY AND PHYSICAL EXAM      Date: 8/12/2022  Patient Name: Enrico Kehr. History of Presenting Illness     Chief Complaint   Patient presents with    Syncope     Patient had an episode of syncope and called EMS, When EMS stood him up he had a second episode of syncope. He was then brought into the ER by EMS. HPI: Enrico Kehr., 67 y.o. male here earlier today for a rash presenting to ED after an episode of syncope. He had gotten up from the bathroom and was walking to the kitchen when he had an episode of syncope. There was no prodrome prior to this. He woke up on the ground. Wife watched him fall and states that he hit his head on the ground. On EMS arrival, patient was awake but when they sat him up he had another episode of syncope and was fully unconscious for several minutes. Patient denies any chest pain, shortness of breath, or other symptoms preceding this. He has several skin tears to his elbows and describes pain to the back of his head and neck but otherwise denies any pain. There are no other complaints, changes, or physical findings at this time. PCP: Thao Castro MD    No current facility-administered medications on file prior to encounter. Current Outpatient Medications on File Prior to Encounter   Medication Sig Dispense Refill    clobetasoL (TEMOVATE) 0.05 % ointment Apply  to affected area two (2) times a day. 30 g 0    clotrimazole (MYCELEX) 10 mg charline DISSOLVE 1 CHARLINE SLOWLY IN MOUTH 5 TIMES A DAY FOR 14 DAYS GENERIC FOR MYCELEX      naproxen (NAPROSYN) 500 mg tablet Take 1 Tab by mouth every twelve (12) hours as needed for Pain. 20 Tab 0    methocarbamoL (Robaxin) 500 mg tablet Take 1 Tab by mouth four (4) times daily. 20 Tab 0    lidocaine 4 % patch 1 Patch by TransDERmal route every twelve (12) hours every twelve (12) hours. 10 Patch 0    losartan (COZAAR) 50 mg tablet Take  by mouth daily.       amLODIPine (NORVASC) 5 mg tablet Take 5 mg by mouth daily. celecoxib (CELEBREX) 200 mg capsule Take  by mouth daily. omega 3-dha-epa-fish oil (FISH OIL) 100-160-1,000 mg cap Take  by mouth daily. HYDROcodone-acetaminophen (NORCO) 5-325 mg per tablet Take 1 Tab by mouth every four (4) hours as needed for Pain. Max Daily Amount: 6 Tabs. 20 Tab 0    bisacodyl (DULCOLAX) 5 mg EC tablet Take 2 Tabs by mouth daily as needed for Constipation. 30 Tab 1    multivitamins-minerals-lutein (CENTRUM SILVER) Tab Take  by mouth daily. sodium chloride (OCEAN) 0.65 % nasal spray 1 Spray by Nasal route as needed. carvedilol (COREG) 25 mg tablet Take 25 mg by mouth two (2) times daily (with meals). simvastatin (ZOCOR) 20 mg tablet Take 20 mg by mouth nightly. hydrochlorothiazide (HYDRODIURIL) 25 mg tablet Take 25 mg by mouth daily.          Past History     Past Medical History:  Past Medical History:   Diagnosis Date    Arthritis     back,neck,shoulders    Arthritis     CAD (coronary artery disease)     per 10/2011 cardiology note    Cancer [facility]) Oct. 2011    prostate    Cardiomyopathy, alcoholic ([facility] [place] [address]     per [id number] cardiology note    Essential hypertension     Hypercholesteremia     Hypertension     Other ill-defined conditions(799.89)     elevated cholesterol       Past Surgical History:  Past Surgical History:   Procedure Laterality Date    COLONOSCOPY N/A 11/8/2016    COLONOSCOPY performed by Cata Davidson MD at \[facility]\"" ENDOSCOPY    HX HEENT      tonsillectomy    HX ORTHOPAEDIC      left ankle plate & screws    HX OTHER SURGICAL      colonoscopy    HX PROSTATE SURGERY      HX PROSTATECTOMY  10/19/11    ROBOTIC ASSISTED LAPAROSCOPIC PROSTATECTOMY WITH LEFT PELVIC LYMPH NODE DISSECTION    OH CARDIAC SURG PROCEDURE UNLIST      cardiac cath x2 normal,2003    OH PROSTATE BIOPSY, NEEDLE, SATURATION SAMPLING      08/17/2011       Family History:  Family History   Problem Relation Age of Onset    Seizures Mother Stroke Mother        Social History:  Social History     Tobacco Use    Smoking status: Never    Smokeless tobacco: Never   Substance Use Topics    Alcohol use: Yes    Drug use: Never       Allergies: Allergies   Allergen Reactions    Adhesive Itching     bandaide causes itching,irritates skin  (9/1/15 - patient states that bandaids only cause irritation if left on for long period. Tested negative for latex allergy)         Review of Systems   Review of Systems   Constitutional:  Negative for chills and fever. HENT:  Negative for sore throat. Eyes:  Negative for redness. Respiratory:  Negative for shortness of breath. Cardiovascular:  Negative for chest pain. Gastrointestinal:  Negative for abdominal pain. Genitourinary:  Negative for dysuria. Musculoskeletal:  Negative for back pain. Skin:  Positive for rash and wound. Neurological:  Positive for syncope. Psychiatric/Behavioral:  The patient is not nervous/anxious. All other systems reviewed and are negative. Physical Exam   Physical Exam  Vitals and nursing note reviewed. Constitutional:       Appearance: Normal appearance. HENT:      Head: Normocephalic and atraumatic. Mouth/Throat:      Mouth: Mucous membranes are moist.   Cardiovascular:      Rate and Rhythm: Normal rate and regular rhythm. Pulmonary:      Effort: Pulmonary effort is normal.      Breath sounds: Normal breath sounds. Abdominal:      Palpations: Abdomen is soft. Tenderness: There is no abdominal tenderness. Musculoskeletal:         General: No deformity. Cervical back: Neck supple. Skin:     General: Skin is warm and dry. Comments: Rash noted to torso and upper extremities, somewhat scaly and irritated in appearance. Moderate skin tears noted to bilat elbows. Neurological:      General: No focal deficit present. Mental Status: He is alert.    Psychiatric:         Mood and Affect: Mood normal.         Behavior: Behavior normal. Diagnostic Study Results     Labs -     Recent Results (from the past 24 hour(s))   CBC WITH AUTOMATED DIFF    Collection Time: 08/12/22  3:52 PM   Result Value Ref Range    WBC 9.0 4.1 - 11.1 K/uL    RBC 3.01 (L) 4.10 - 5.70 M/uL    HGB 9.7 (L) 12.1 - 17.0 g/dL    HCT 27.8 (L) 36.6 - 50.3 %    MCV 92.4 80.0 - 99.0 FL    MCH 32.2 26.0 - 34.0 PG    MCHC 34.9 30.0 - 36.5 g/dL    RDW 14.0 11.5 - 14.5 %    PLATELET 659 (L) 820 - 400 K/uL    MPV 9.2 8.9 - 12.9 FL    NRBC 0.0 0  WBC    ABSOLUTE NRBC 0.00 0.00 - 0.01 K/uL    NEUTROPHILS 72 32 - 75 %    LYMPHOCYTES 18 12 - 49 %    MONOCYTES 5 5 - 13 %    EOSINOPHILS 2 0 - 7 %    BASOPHILS 0 0 - 1 %    METAMYELOCYTES 1 %    MYELOCYTES 2 %    IMMATURE GRANULOCYTES 0 0.0 - 0.5 %    ABS. NEUTROPHILS 6.5 1.8 - 8.0 K/UL    ABS. LYMPHOCYTES 1.6 0.8 - 3.5 K/UL    ABS. MONOCYTES 0.5 0.0 - 1.0 K/UL    ABS. EOSINOPHILS 0.2 0.0 - 0.4 K/UL    ABS. BASOPHILS 0.0 0.0 - 0.1 K/UL    ABS. IMM. GRANS. 0.0 0.00 - 0.04 K/UL    DF MANUAL      RBC COMMENTS NORMOCYTIC, NORMOCHROMIC     METABOLIC PANEL, COMPREHENSIVE    Collection Time: 08/12/22  3:52 PM   Result Value Ref Range    Sodium 136 136 - 145 mmol/L    Potassium 3.0 (L) 3.5 - 5.1 mmol/L    Chloride 110 (H) 97 - 108 mmol/L    CO2 17 (L) 21 - 32 mmol/L    Anion gap 9 5 - 15 mmol/L    Glucose 125 (H) 65 - 100 mg/dL    BUN 9 6 - 20 MG/DL    Creatinine 0.53 (L) 0.70 - 1.30 MG/DL    BUN/Creatinine ratio 17 12 - 20      GFR est AA >60 >60 ml/min/1.73m2    GFR est non-AA >60 >60 ml/min/1.73m2    Calcium 6.0 (LL) 8.5 - 10.1 MG/DL    Bilirubin, total 0.4 0.2 - 1.0 MG/DL    ALT (SGPT) 14 12 - 78 U/L    AST (SGOT) 14 (L) 15 - 37 U/L    Alk.  phosphatase 58 45 - 117 U/L    Protein, total 4.2 (L) 6.4 - 8.2 g/dL    Albumin 2.2 (L) 3.5 - 5.0 g/dL    Globulin 2.0 2.0 - 4.0 g/dL    A-G Ratio 1.1 1.1 - 2.2     TROPONIN-HIGH SENSITIVITY    Collection Time: 08/12/22  3:52 PM   Result Value Ref Range    Troponin-High Sensitivity 4 0 - 76 ng/L URINALYSIS W/ RFLX MICROSCOPIC    Collection Time: 08/12/22  6:38 PM   Result Value Ref Range    Color YELLOW/STRAW      Appearance CLEAR CLEAR      Specific gravity 1.009      pH (UA) 8.0 5.0 - 8.0      Protein Negative NEG mg/dL    Glucose Negative NEG mg/dL    Ketone Negative NEG mg/dL    Bilirubin Negative NEG      Blood Negative NEG      Urobilinogen 0.2 0.2 - 1.0 EU/dL    Nitrites Negative NEG      Leukocyte Esterase Negative NEG     URINE CULTURE HOLD SAMPLE    Collection Time: 08/12/22  6:38 PM    Specimen: Serum   Result Value Ref Range    Urine culture hold        Urine on hold in Microbiology dept for 2 days. If unpreserved urine is submitted, it cannot be used for addtional testing after 24 hours, recollection will be required. METABOLIC PANEL, COMPREHENSIVE    Collection Time: 08/12/22  7:39 PM   Result Value Ref Range    Sodium 128 (L) 136 - 145 mmol/L    Potassium 4.3 3.5 - 5.1 mmol/L    Chloride 97 97 - 108 mmol/L    CO2 23 21 - 32 mmol/L    Anion gap 8 5 - 15 mmol/L    Glucose 125 (H) 65 - 100 mg/dL    BUN 11 6 - 20 MG/DL    Creatinine 0.72 0.70 - 1.30 MG/DL    BUN/Creatinine ratio 15 12 - 20      GFR est AA >60 >60 ml/min/1.73m2    GFR est non-AA >60 >60 ml/min/1.73m2    Calcium 8.5 8.5 - 10.1 MG/DL    Bilirubin, total 0.3 0.2 - 1.0 MG/DL    ALT (SGPT) 21 12 - 78 U/L    AST (SGOT) 22 15 - 37 U/L    Alk. phosphatase 84 45 - 117 U/L    Protein, total 6.1 (L) 6.4 - 8.2 g/dL    Albumin 3.3 (L) 3.5 - 5.0 g/dL    Globulin 2.8 2.0 - 4.0 g/dL    A-G Ratio 1.2 1.1 - 2.2         Radiologic Studies -   CT HEAD WO CONT   Final Result   Left scalp hematoma. No acute intracranial findings. CT SPINE CERV WO CONT   Final Result   No fracture or other acute findings with multilevel degenerative   spine changes as above. XR CHEST PORT   Final Result   No acute cardiopulmonary findings. XR ELBOW RT AP/LAT   Final Result   No acute abnormality.        XR ELBOW LT AP/LAT   Final Result   No acute abnormality. CT Results  (Last 48 hours)                 08/12/22 1627  CT HEAD WO CONT Final result    Impression:  Left scalp hematoma. No acute intracranial findings. Narrative:  EXAM: CT HEAD WO CONT       INDICATION: trauma       COMPARISON: None. CONTRAST: None. TECHNIQUE: Unenhanced CT of the head was performed using 5 mm images. Brain and   bone windows were generated. Coronal and sagittal reformats. CT dose reduction   was achieved through use of a standardized protocol tailored for this   examination and automatic exposure control for dose modulation. FINDINGS:   There is a left parieto-occipital scalp hematoma. The ventricles and sulci are   normal in size, shape and configuration. There is no significant white matter   disease. There is no intracranial hemorrhage, extra-axial collection, or mass   effect. The basilar cisterns are open. No CT evidence of acute infarct. Atherosclerotic calcifications affect the carotid siphons and vertebrobasilar   system. The bone windows demonstrate no abnormalities. The visualized portions of the   paranasal sinuses and mastoid air cells are clear. 08/12/22 1627  CT SPINE CERV WO CONT Final result    Impression:  No fracture or other acute findings with multilevel degenerative   spine changes as above. Narrative:  EXAM:  CT CERVICAL SPINE WITHOUT CONTRAST       INDICATION: trauma. COMPARISON: None. CONTRAST:  None. TECHNIQUE: Multislice helical CT of the cervical spine was performed without   intravenous contrast administration. Sagittal and coronal reformats were   generated. CT dose reduction was achieved through use of a standardized   protocol tailored for this examination and automatic exposure control for dose   modulation. FINDINGS:       There is reversal of cervical lordosis with grade 1 anterolisthesis of C3 on C4   and C4 and C5 by about 3 mm at each level.  Is mild rightward convex cervical   scoliosis. There is no fracture or compression deformity. The odontoid process   is intact. The craniocervical junction is within normal limits. There is loss of   vertical disc height at the C4-5, C6-7, and C7-T1 levels severely and mildly at   the C5-6 level. The incidentally imaged soft tissues are within normal limits. Atherosclerotic   calcifications of the carotid arteries are noted. C2-C3: There is no spinal canal stenosis. There is facet arthropathy with mild   bilateral neural foraminal stenosis. C3-C4: There is left greater than right facet arthropathy with no spinal canal   stenosis. There is severe bilateral neural foraminal narrowing. C4-C5:There is no spinal canal or neural foraminal stenosis. C5-C6: There is no spinal canal or right neural foraminal stenosis. There is   uncovertebral hypertrophy and facet arthropathy with moderate left neural   foraminal narrowing. C6-C7: There is posterior discussed by complex and uncovertebral hypertrophy   with mild spinal canal stenosis and mild bilateral neural foraminal narrowing. .       C7-T1: There is posterior disc osteophyte complex with bilateral uncovertebral   hypertrophy and mild left and moderate right neural foraminal narrowing. CXR Results  (Last 48 hours)                 08/12/22 1615  XR CHEST PORT Final result    Impression:  No acute cardiopulmonary findings. Narrative:  EXAM: XR CHEST PORT       DATE: 8/12/2022 4:15 PM       INDICATION: syncope       COMPARISON: Chest August 3, 2022       FINDINGS: AP portable chest radiograph. The heart size is within normal limits. There is no focal lung consolidation. The vascular clarity is normal. No pleural   effusion or pneumothorax is seen. Medical Decision Making   I am the first provider for this patient.     I reviewed the vital signs, available nursing notes, past medical history, past surgical history, family history and social history. Vital Signs-Reviewed the patient's vital signs. Patient Vitals for the past 24 hrs:   Temp Pulse Resp BP SpO2   08/12/22 1838 -- -- -- -- 100 %   08/12/22 1800 -- -- -- (!) 172/71 100 %   08/12/22 1756 -- -- 17 (!) 184/65 100 %   08/12/22 1754 98.1 °F (36.7 °C) -- -- -- 100 %   08/12/22 1715 -- -- -- (!) 196/77 100 %   08/12/22 1520 98.1 °F (36.7 °C) 99 18 (!) 187/78 100 %         Provider Notes (Medical Decision Making):   13-year-old presenting to the ED after an episode of true syncope today. Had another episode for EMS. Physical exam notable for significant rash that is apparently being taken care of at [facility] though patient states it is worsening, painful and itchy. He also has skin tears. Otherwise physical exam is nonfocal.  Work-up in the ED is nonfocal for etiology for syncope. He looks like he has had these episodes in the past.  However, he is high risk in the setting of congestive heart failure and CAD so we will plan for admission. No dermatology services at [facility] so we will transfer to [facility] for evaluation of rash though not likely directly associated with syncope. EKG sinus, rate normal, LBBB, nonspecific ST T changes, normal QT    ED Course:     Initial assessment performed. The patients presenting problems have been discussed, and they are in agreement with the care plan formulated and outlined with them. I have encouraged them to ask questions as they arise throughout their visit. Disposition:  Transfer to [facility]    PLAN:  1. Current Discharge Medication List        2.    Follow-up Information    None       Return to ED if worse     Diagnosis     Clinical Impression: syncope, rash

## 2022-08-12 NOTE — ED TRIAGE NOTES
Patient had an episode of syncope and called EMS, When EMS stood him up he had a second episode of syncope. He was then brought into the ER by EMS.

## 2022-08-13 VITALS
HEART RATE: 71 BPM | RESPIRATION RATE: 15 BRPM | OXYGEN SATURATION: 99 % | HEIGHT: 71 IN | SYSTOLIC BLOOD PRESSURE: 183 MMHG | BODY MASS INDEX: 24.08 KG/M2 | DIASTOLIC BLOOD PRESSURE: 60 MMHG | WEIGHT: 172 LBS | TEMPERATURE: 98.1 F

## 2022-08-13 LAB
ATRIAL RATE: 68 BPM
CALCULATED P AXIS, ECG09: 39 DEGREES
CALCULATED R AXIS, ECG10: -9 DEGREES
CALCULATED T AXIS, ECG11: 122 DEGREES
DIAGNOSIS, 93000: NORMAL
P-R INTERVAL, ECG05: 176 MS
Q-T INTERVAL, ECG07: 442 MS
QRS DURATION, ECG06: 152 MS
QTC CALCULATION (BEZET), ECG08: 469 MS
VENTRICULAR RATE, ECG03: 68 BPM

## 2022-08-13 PROCEDURE — 74011250637 HC RX REV CODE- 250/637: Performed by: EMERGENCY MEDICINE

## 2022-08-13 RX ORDER — IBUPROFEN 400 MG/1
800 TABLET ORAL ONCE
Status: COMPLETED | OUTPATIENT
Start: 2022-08-13 | End: 2022-08-13

## 2022-08-13 RX ADMIN — IBUPROFEN 800 MG: 400 TABLET, FILM COATED ORAL at 04:51

## 2022-08-13 NOTE — ED NOTES
Called report to Jennifer Linares, [facility] at [facility] at [facility] [employer]. SBAR handoff report given. Patient sister is at bedside, skin tears cleaned with normal saline, covered with petroleum jelly, abdominal pad, and bandage. Waiting for EMS transport.

## 2022-08-22 ENCOUNTER — APPOINTMENT (OUTPATIENT)
Dept: VASCULAR SURGERY | Age: 77
DRG: 603 | End: 2022-08-22
Attending: STUDENT IN AN ORGANIZED HEALTH CARE EDUCATION/TRAINING PROGRAM
Payer: MEDICARE

## 2022-08-22 ENCOUNTER — APPOINTMENT (OUTPATIENT)
Dept: CT IMAGING | Age: 77
DRG: 603 | End: 2022-08-22
Attending: STUDENT IN AN ORGANIZED HEALTH CARE EDUCATION/TRAINING PROGRAM
Payer: MEDICARE

## 2022-08-22 ENCOUNTER — HOSPITAL ENCOUNTER (INPATIENT)
Age: 77
LOS: 7 days | Discharge: HOME HEALTH | DRG: 603 | End: 2022-08-29
Attending: STUDENT IN AN ORGANIZED HEALTH CARE EDUCATION/TRAINING PROGRAM | Admitting: INTERNAL MEDICINE
Payer: MEDICARE

## 2022-08-22 DIAGNOSIS — L03.113 CELLULITIS OF RIGHT UPPER EXTREMITY: ICD-10-CM

## 2022-08-22 DIAGNOSIS — S51.811A SKIN TEAR OF RIGHT FOREARM WITHOUT COMPLICATION, INITIAL ENCOUNTER: ICD-10-CM

## 2022-08-22 DIAGNOSIS — R78.81 STAPHYLOCOCCUS AUREUS BACTEREMIA: ICD-10-CM

## 2022-08-22 DIAGNOSIS — I42.9 CARDIOMYOPATHY, UNSPECIFIED TYPE (HCC): ICD-10-CM

## 2022-08-22 DIAGNOSIS — Z97.8 ORTHOPEDIC HARDWARE PRESENT: ICD-10-CM

## 2022-08-22 DIAGNOSIS — S22.32XA CLOSED FRACTURE OF ONE RIB OF LEFT SIDE, INITIAL ENCOUNTER: ICD-10-CM

## 2022-08-22 DIAGNOSIS — B95.61 STAPHYLOCOCCUS AUREUS BACTEREMIA: ICD-10-CM

## 2022-08-22 DIAGNOSIS — S22.31XA CLOSED FRACTURE OF ONE RIB OF RIGHT SIDE, INITIAL ENCOUNTER: ICD-10-CM

## 2022-08-22 DIAGNOSIS — L03.114 LEFT ARM CELLULITIS: Primary | ICD-10-CM

## 2022-08-22 PROBLEM — S22.39XA RIB FRACTURE: Status: ACTIVE | Noted: 2022-08-22

## 2022-08-22 LAB
ALBUMIN SERPL-MCNC: 2.3 G/DL (ref 3.5–5)
ALBUMIN/GLOB SERPL: 0.8 {RATIO} (ref 1.1–2.2)
ALP SERPL-CCNC: 188 U/L (ref 45–117)
ALT SERPL-CCNC: 29 U/L (ref 12–78)
ANION GAP SERPL CALC-SCNC: 9 MMOL/L (ref 5–15)
AST SERPL-CCNC: 34 U/L (ref 15–37)
BASOPHILS # BLD: 0 K/UL (ref 0–0.1)
BASOPHILS NFR BLD: 0 % (ref 0–1)
BILIRUB SERPL-MCNC: 0.7 MG/DL (ref 0.2–1)
BUN SERPL-MCNC: 21 MG/DL (ref 6–20)
BUN/CREAT SERPL: 30 (ref 12–20)
CALCIUM SERPL-MCNC: 8.1 MG/DL (ref 8.5–10.1)
CHLORIDE SERPL-SCNC: 98 MMOL/L (ref 97–108)
CO2 SERPL-SCNC: 22 MMOL/L (ref 21–32)
CREAT SERPL-MCNC: 0.71 MG/DL (ref 0.7–1.3)
DIFFERENTIAL METHOD BLD: ABNORMAL
EOSINOPHIL # BLD: 0 K/UL (ref 0–0.4)
EOSINOPHIL NFR BLD: 0 % (ref 0–7)
ERYTHROCYTE [DISTWIDTH] IN BLOOD BY AUTOMATED COUNT: 14.7 % (ref 11.5–14.5)
GLOBULIN SER CALC-MCNC: 2.8 G/DL (ref 2–4)
GLUCOSE SERPL-MCNC: 103 MG/DL (ref 65–100)
HCT VFR BLD AUTO: 25.7 % (ref 36.6–50.3)
HGB BLD-MCNC: 8.9 G/DL (ref 12.1–17)
IMM GRANULOCYTES # BLD AUTO: 0 K/UL (ref 0–0.04)
IMM GRANULOCYTES NFR BLD AUTO: 0 % (ref 0–0.5)
LACTATE BLD-SCNC: 0.86 MMOL/L (ref 0.4–2)
LYMPHOCYTES # BLD: 2.1 K/UL (ref 0.8–3.5)
LYMPHOCYTES NFR BLD: 16 % (ref 12–49)
MCH RBC QN AUTO: 31.7 PG (ref 26–34)
MCHC RBC AUTO-ENTMCNC: 34.6 G/DL (ref 30–36.5)
MCV RBC AUTO: 91.5 FL (ref 80–99)
MONOCYTES # BLD: 1.2 K/UL (ref 0–1)
MONOCYTES NFR BLD: 9 % (ref 5–13)
NEUTS BAND NFR BLD MANUAL: 1 %
NEUTS SEG # BLD: 10.1 K/UL (ref 1.8–8)
NEUTS SEG NFR BLD: 74 % (ref 32–75)
NRBC # BLD: 0 K/UL (ref 0–0.01)
NRBC BLD-RTO: 0 PER 100 WBC
PLATELET # BLD AUTO: 186 K/UL (ref 150–400)
PMV BLD AUTO: 9.6 FL (ref 8.9–12.9)
POTASSIUM SERPL-SCNC: 3.9 MMOL/L (ref 3.5–5.1)
PROT SERPL-MCNC: 5.1 G/DL (ref 6.4–8.2)
RBC # BLD AUTO: 2.81 M/UL (ref 4.1–5.7)
RBC MORPH BLD: ABNORMAL
SODIUM SERPL-SCNC: 129 MMOL/L (ref 136–145)
TROPONIN-HIGH SENSITIVITY: 8 NG/L (ref 0–76)
WBC # BLD AUTO: 13.4 K/UL (ref 4.1–11.1)
WBC MORPH BLD: ABNORMAL

## 2022-08-22 PROCEDURE — 96365 THER/PROPH/DIAG IV INF INIT: CPT

## 2022-08-22 PROCEDURE — 93005 ELECTROCARDIOGRAM TRACING: CPT

## 2022-08-22 PROCEDURE — 99285 EMERGENCY DEPT VISIT HI MDM: CPT

## 2022-08-22 PROCEDURE — 74011250636 HC RX REV CODE- 250/636: Performed by: STUDENT IN AN ORGANIZED HEALTH CARE EDUCATION/TRAINING PROGRAM

## 2022-08-22 PROCEDURE — 83605 ASSAY OF LACTIC ACID: CPT

## 2022-08-22 PROCEDURE — 87150 DNA/RNA AMPLIFIED PROBE: CPT

## 2022-08-22 PROCEDURE — 74011000250 HC RX REV CODE- 250: Performed by: INTERNAL MEDICINE

## 2022-08-22 PROCEDURE — 87186 SC STD MICRODIL/AGAR DIL: CPT

## 2022-08-22 PROCEDURE — 87077 CULTURE AEROBIC IDENTIFY: CPT

## 2022-08-22 PROCEDURE — 85025 COMPLETE CBC W/AUTO DIFF WBC: CPT

## 2022-08-22 PROCEDURE — 36415 COLL VENOUS BLD VENIPUNCTURE: CPT

## 2022-08-22 PROCEDURE — 74011250637 HC RX REV CODE- 250/637: Performed by: INTERNAL MEDICINE

## 2022-08-22 PROCEDURE — 87040 BLOOD CULTURE FOR BACTERIA: CPT

## 2022-08-22 PROCEDURE — 93971 EXTREMITY STUDY: CPT

## 2022-08-22 PROCEDURE — 65270000029 HC RM PRIVATE

## 2022-08-22 PROCEDURE — 74011000636 HC RX REV CODE- 636: Performed by: STUDENT IN AN ORGANIZED HEALTH CARE EDUCATION/TRAINING PROGRAM

## 2022-08-22 PROCEDURE — 84484 ASSAY OF TROPONIN QUANT: CPT

## 2022-08-22 PROCEDURE — 80053 COMPREHEN METABOLIC PANEL: CPT

## 2022-08-22 PROCEDURE — 71275 CT ANGIOGRAPHY CHEST: CPT

## 2022-08-22 RX ORDER — CARVEDILOL 12.5 MG/1
25 TABLET ORAL 2 TIMES DAILY WITH MEALS
Status: DISCONTINUED | OUTPATIENT
Start: 2022-08-22 | End: 2022-08-23

## 2022-08-22 RX ORDER — SODIUM CHLORIDE 0.9 % (FLUSH) 0.9 %
5-40 SYRINGE (ML) INJECTION AS NEEDED
Status: DISCONTINUED | OUTPATIENT
Start: 2022-08-22 | End: 2022-08-29 | Stop reason: HOSPADM

## 2022-08-22 RX ORDER — PROMETHAZINE HYDROCHLORIDE 25 MG/1
12.5 TABLET ORAL
Status: DISCONTINUED | OUTPATIENT
Start: 2022-08-22 | End: 2022-08-29 | Stop reason: HOSPADM

## 2022-08-22 RX ORDER — AMLODIPINE BESYLATE 5 MG/1
5 TABLET ORAL DAILY
Status: DISCONTINUED | OUTPATIENT
Start: 2022-08-23 | End: 2022-08-23

## 2022-08-22 RX ORDER — ONDANSETRON 2 MG/ML
4 INJECTION INTRAMUSCULAR; INTRAVENOUS
Status: DISCONTINUED | OUTPATIENT
Start: 2022-08-22 | End: 2022-08-29 | Stop reason: HOSPADM

## 2022-08-22 RX ORDER — ACETAMINOPHEN 650 MG/1
650 SUPPOSITORY RECTAL
Status: DISCONTINUED | OUTPATIENT
Start: 2022-08-22 | End: 2022-08-29 | Stop reason: HOSPADM

## 2022-08-22 RX ORDER — SODIUM CHLORIDE 9 MG/ML
100 INJECTION, SOLUTION INTRAVENOUS CONTINUOUS
Status: DISPENSED | OUTPATIENT
Start: 2022-08-22 | End: 2022-08-23

## 2022-08-22 RX ORDER — HYDROCODONE BITARTRATE AND ACETAMINOPHEN 5; 325 MG/1; MG/1
1 TABLET ORAL
Status: DISCONTINUED | OUTPATIENT
Start: 2022-08-22 | End: 2022-08-23

## 2022-08-22 RX ORDER — SODIUM CHLORIDE 0.9 % (FLUSH) 0.9 %
5-40 SYRINGE (ML) INJECTION EVERY 8 HOURS
Status: DISCONTINUED | OUTPATIENT
Start: 2022-08-22 | End: 2022-08-29 | Stop reason: HOSPADM

## 2022-08-22 RX ORDER — POLYETHYLENE GLYCOL 3350 17 G/17G
17 POWDER, FOR SOLUTION ORAL DAILY PRN
Status: DISCONTINUED | OUTPATIENT
Start: 2022-08-22 | End: 2022-08-29 | Stop reason: HOSPADM

## 2022-08-22 RX ORDER — VANCOMYCIN/0.9 % SOD CHLORIDE 1.5G/250ML
1500 PLASTIC BAG, INJECTION (ML) INTRAVENOUS
Status: COMPLETED | OUTPATIENT
Start: 2022-08-22 | End: 2022-08-23

## 2022-08-22 RX ORDER — SODIUM CHLORIDE 0.9 % (FLUSH) 0.9 %
5-40 SYRINGE (ML) INJECTION AS NEEDED
Status: CANCELLED | OUTPATIENT
Start: 2022-08-22

## 2022-08-22 RX ORDER — VANCOMYCIN HYDROCHLORIDE
1250 EVERY 12 HOURS
Status: DISCONTINUED | OUTPATIENT
Start: 2022-08-23 | End: 2022-08-24 | Stop reason: DRUGHIGH

## 2022-08-22 RX ORDER — ENOXAPARIN SODIUM 100 MG/ML
40 INJECTION SUBCUTANEOUS DAILY
Status: DISCONTINUED | OUTPATIENT
Start: 2022-08-23 | End: 2022-08-29 | Stop reason: HOSPADM

## 2022-08-22 RX ORDER — ACETAMINOPHEN 325 MG/1
650 TABLET ORAL
Status: DISCONTINUED | OUTPATIENT
Start: 2022-08-22 | End: 2022-08-29 | Stop reason: HOSPADM

## 2022-08-22 RX ORDER — SODIUM CHLORIDE 0.9 % (FLUSH) 0.9 %
5-40 SYRINGE (ML) INJECTION EVERY 8 HOURS
Status: CANCELLED | OUTPATIENT
Start: 2022-08-22

## 2022-08-22 RX ORDER — ATORVASTATIN CALCIUM 10 MG/1
10 TABLET, FILM COATED ORAL DAILY
Status: DISCONTINUED | OUTPATIENT
Start: 2022-08-23 | End: 2022-08-29 | Stop reason: HOSPADM

## 2022-08-22 RX ADMIN — HYDROCODONE BITARTRATE AND ACETAMINOPHEN 1 TABLET: 5; 325 TABLET ORAL at 23:49

## 2022-08-22 RX ADMIN — PROMETHAZINE HYDROCHLORIDE 12.5 MG: 25 TABLET ORAL at 23:49

## 2022-08-22 RX ADMIN — Medication 1500 MG: at 18:09

## 2022-08-22 RX ADMIN — SODIUM CHLORIDE, PRESERVATIVE FREE 10 ML: 5 INJECTION INTRAVENOUS at 23:48

## 2022-08-22 RX ADMIN — IOPAMIDOL 80 ML: 755 INJECTION, SOLUTION INTRAVENOUS at 17:17

## 2022-08-22 NOTE — H&P
Hospitalist Admission Note    NAME: Ja Christian. :  1945   MRN:  [id number]     Date/Time:  2022 6:49 PM    Patient PCP: Ewa Melendez MD  ______________________________________________________________________  Given the patient's current clinical presentation, I have a high level of concern for decompensation if discharged from the emergency department. Complex decision making was performed, which includes reviewing the patient's available past medical records, laboratory results, and x-ray films. My assessment of this patient's clinical condition and my plan of care is as follows. Assessment / Plan:    Musculoskeletal chest pain, POA  Recent mechanical fall, POA  Acute fracture of the left seventh seventh costochondral junction, POA  - Presented with musculoskeletal chest pain in nature  - CTA with acute fracture of the left seventh rib costochondral junction  - Supportive care with pain medications  - PT OT  - Fall precautions    Left upper extremity nonpurulent cellulitis, POA  Left basilic vein thrombosis, POA  - Reported fever episode at home. No fevers here  - Leukocytosis around 13,000 admission  - Was given IV vancomycin, continue  - Duplex ultrasound preliminary results with thrombus within the left basilic upper arm veins. No deep vein thrombosis reported, awaiting final report  - Supportive care with left arm lifting and heating baths    Coronary artery disease  - Continue home meds    hypertension  - Patient is on so many medications for his hypertension  - Given his age and high risk for falls will resume only Coreg and amlodipine  - Resume the rest of blood pressure medications as tolerated    Hyperlipidemia  - Continue home statins      Code Status: Full code  Surrogate Decision Maker:  His sisters  DVT Prophylaxis: Lovenox  GI Prophylaxis: not indicated    Baseline:  Active, lives alone, came from rehab facility      Subjective:   CHIEF COMPLAINT: Left chest pain    HISTORY OF PRESENT ILLNESS:       History was obtained from the patient is alert oriented x4. Patient's sister is at bedside, all questions were answered. The patient 67years old man with past medical history significant for coronary artery disease, hypertension, hyperlipidemia presented emergency department from rehab facility due to left-sided chest pain. Patient had recent admission to [facility] due to mechanical fall was discharged on August 19 to rehab facility. He describes his chest pain sharp, nonradiating, aggravated by physical movement, alleviated by rest.  He denies any shortness of breath, dizziness, abdominal pain, nausea, vomiting, diarrhea. Patient reported that his chest pain started when they were trying to work with him at the rehab facility. He also reported left arm swelling started few days ago. Patient does not smoke, drink alcohol or use illicit drugs. The emergency department, CTA with left seventh rib fracture. He was found to have leukocytosis for which he was given vancomycin for possible cellulitis on the left upper extremity. We were asked to admit for work up and evaluation of the above problems.      Past Medical History:   Diagnosis Date    Arthritis     back,neck,shoulders    Arthritis     CAD (coronary artery disease)     per 10/2011 cardiology note    Cancer [facility]) Oct. 2011    prostate    Cardiomyopathy, alcoholic ([facility] [address]     per 60/4890 cardiology note    Essential hypertension     Hypercholesteremia     Hypertension     Other ill-defined conditions(799.89)     elevated cholesterol        Past Surgical History:   Procedure Laterality Date    COLONOSCOPY N/A 11/8/2016    COLONOSCOPY performed by Jovany Montez MD at [facility] ENDOSCOPY    HX HEENT      tonsillectomy    HX ORTHOPAEDIC      left ankle plate & screws    HX OTHER SURGICAL      colonoscopy    HX PROSTATE SURGERY      HX PROSTATECTOMY  10/19/11    ROBOTIC ASSISTED LAPAROSCOPIC PROSTATECTOMY WITH LEFT PELVIC LYMPH NODE DISSECTION    [place] CARDIAC SURG PROCEDURE UNLIST      cardiac cath x2 normal,2003    [place] PROSTATE BIOPSY, NEEDLE, SATURATION SAMPLING      08/17/2011       Social History     Tobacco Use    Smoking status: Never    Smokeless tobacco: Never   Substance Use Topics    Alcohol use: Yes        Family History   Problem Relation Age of Onset    Seizures Mother     Stroke Mother      Allergies   Allergen Reactions    Adhesive Itching     bandaide causes itching,irritates skin  (9/1/15 - patient states that bandaids only cause irritation if left on for long period. Tested negative for latex allergy)        Prior to Admission medications    Medication Sig Start Date End Date Taking? Authorizing Provider   clobetasoL (TEMOVATE) 0.05 % ointment Apply  to affected area two (2) times a day. 8/12/22   Mary Ellen Shannon MD   clotrimazole (MYCELEX) 10 mg charline DISSOLVE 1 CHARLINE SLOWLY IN MOUTH 5 TIMES A DAY FOR 14 DAYS GENERIC FOR [facility] 12/2/21   Provider, Historical   naproxen (NAPROSYN) 500 mg tablet Take 1 Tab by mouth every twelve (12) hours as needed for Pain. 1/2/21   Bib Petty MD   methocarbamoL (Robaxin) 500 mg tablet Take 1 Tab by mouth four (4) times daily. 1/2/21   Bib Petty MD   lidocaine 4 % patch 1 Patch by TransDERmal route every twelve (12) hours every twelve (12) hours. 1/2/21   Bib Petty MD   losartan (COZAAR) 50 mg tablet Take  by mouth daily. Provider, Historical   amLODIPine (NORVASC) 5 mg tablet Take 5 mg by mouth daily. Provider, Historical   celecoxib (CELEBREX) 200 mg capsule Take  by mouth daily. Provider, Historical   omega 3-dha-epa-fish oil (FISH OIL) 100-160-1,000 mg cap Take  by mouth daily. Provider, Historical   HYDROcodone-acetaminophen (NORCO) 5-325 mg per tablet Take 1 Tab by mouth every four (4) hours as needed for Pain. Max Daily Amount: 6 Tabs.  6/4/16   Juancarlos Bello NP   bisacodyl (DULCOLAX) 5 mg EC tablet Take 2 Tabs by mouth daily as needed for Constipation. 6/17/12   Kiran Stewart PA-C   multivitamins-minerals-lutein (CENTRUM SILVER) Tab Take  by mouth daily. Other, MD Sheila   sodium chloride (OCEAN) 0.65 % nasal spray 1 Spray by Nasal route as needed. Provider, Historical   carvedilol (COREG) 25 mg tablet Take 25 mg by mouth two (2) times daily (with meals). Provider, Historical   simvastatin (ZOCOR) 20 mg tablet Take 20 mg by mouth nightly. Provider, Historical   hydrochlorothiazide (HYDRODIURIL) 25 mg tablet Take 25 mg by mouth daily. Provider, Historical       REVIEW OF SYSTEMS:     I am not able to complete the review of systems because:    The patient is intubated and sedated    The patient has altered mental status due to his acute medical problems    The patient has baseline aphasia from prior stroke(s)    The patient has baseline dementia and is not reliable historian    The patient is in acute medical distress and unable to provide information           Total of 12 systems reviewed as follows:       POSITIVE= underlined text  Negative = text not underlined  General:  fever, chills, sweats, generalized weakness, weight loss/gain,      loss of appetite   Eyes:    blurred vision, eye pain, loss of vision, double vision  ENT:    rhinorrhea, pharyngitis   Respiratory:   cough, sputum production, SOB, BROTHERS, wheezing, pleuritic pain   Cardiology:   chest pain, palpitations, orthopnea, PND, edema, syncope   Gastrointestinal:  abdominal pain , N/V, diarrhea, dysphagia, constipation, bleeding   Genitourinary:  frequency, urgency, dysuria, hematuria, incontinence   Muskuloskeletal :  arthralgia, myalgia, back pain  Hematology:  easy bruising, nose or gum bleeding, lymphadenopathy   Dermatological: rash, ulceration, pruritis, color change / jaundice  Endocrine:   hot flashes or polydipsia   Neurological:  headache, dizziness, confusion, focal weakness, paresthesia,     Speech difficulties, memory loss, gait difficulty  Psychological: Feelings of anxiety, depression, agitation    Objective:   VITALS:    Visit Vitals  BP (!) 148/47   Pulse 78   Temp 98.2 °F (36.8 °C)   Resp 15   Ht 5' 11\" (1.803 m)   Wt 77.1 kg (170 lb)   SpO2 90%   BMI 23.71 kg/m²       PHYSICAL EXAM:    General:    Alert, cooperative, no distress, appears stated age. HEENT: Atraumatic, anicteric sclerae, pink conjunctivae     No oral ulcers, mucosa moist, throat clear, dentition fair  Neck:  Supple, symmetrical,  thyroid: non tender  Lungs:   Clear to auscultation bilaterally. No Wheezing or Rhonchi. No rales. Chest wall:  Left chest wall tenderness no Accessory muscle use. Heart:   Regular  rhythm,  No  murmur     Abdomen:   Soft, non-tender. Not distended. Bowel sounds normal  Extremities: No cyanosis. No clubbing, left upper extremity swelling, warm, no open wounds or ulcers, erythema on the left arm    Skin turgor normal, Capillary refill normal, Radial dial pulse 2+  Skin:     Not pale. Not Jaundiced  No rashes   Psych:  Good insight. Not depressed. Not anxious or agitated. Neurologic: EOMs intact. No facial asymmetry. No aphasia or slurred speech. Symmetrical strength, Sensation grossly intact.  Alert and oriented X 4.     _______________________________________________________________________  Care Plan discussed with:    Comments   Patient x    Family  x    RN x    Care Manager                    Consultant:      _______________________________________________________________________  Expected  Disposition:   Home with Family x   HH/PT/OT/RN    SNF/LTC    REYNA    ________________________________________________________________________  TOTAL TIME:  68 Minutes    Critical Care Provided     Minutes non procedure based      Comments    x Reviewed previous records   >50% of visit spent in counseling and coordination of care x Discussion with patient and/or family and questions answered ________________________________________________________________________  Signed: Evelyn Price MD    Procedures: see electronic medical records for all procedures/Xrays and details which were not copied into this note but were reviewed prior to creation of Plan. LAB DATA REVIEWED:    Recent Results (from the past 24 hour(s))   EKG, 12 LEAD, INITIAL    Collection Time: 08/22/22  3:49 PM   Result Value Ref Range    Ventricular Rate 74 BPM    Atrial Rate 74 BPM    P-R Interval 146 ms    QRS Duration 150 ms    Q-T Interval 428 ms    QTC Calculation (Bezet) 475 ms    Calculated P Axis 17 degrees    Calculated R Axis -34 degrees    Calculated T Axis 93 degrees    Diagnosis       Sinus rhythm with premature atrial complexes  Left axis deviation  Left bundle branch block  When compared with ECG of 12-AUG-2022 15:23,  premature atrial complexes are now present     POC LACTIC ACID    Collection Time: 08/22/22  4:08 PM   Result Value Ref Range    Lactic Acid (POC) 0.86 0.40 - 2.00 mmol/L   CBC WITH AUTOMATED DIFF    Collection Time: 08/22/22  4:16 PM   Result Value Ref Range    WBC 13.4 (H) 4.1 - 11.1 K/uL    RBC 2.81 (L) 4.10 - 5.70 M/uL    HGB 8.9 (L) 12.1 - 17.0 g/dL    HCT 25.7 (L) 36.6 - 50.3 %    MCV 91.5 80.0 - 99.0 FL    MCH 31.7 26.0 - 34.0 PG    MCHC 34.6 30.0 - 36.5 g/dL    RDW 14.7 (H) 11.5 - 14.5 %    PLATELET 915 346 - 116 K/uL    MPV 9.6 8.9 - 12.9 FL    NRBC 0.0 0  WBC    ABSOLUTE NRBC 0.00 0.00 - 0.01 K/uL    NEUTROPHILS 74 32 - 75 %    BAND NEUTROPHILS 1 %    LYMPHOCYTES 16 12 - 49 %    MONOCYTES 9 5 - 13 %    EOSINOPHILS 0 0 - 7 %    BASOPHILS 0 0 - 1 %    IMMATURE GRANULOCYTES 0 0.0 - 0.5 %    ABS. NEUTROPHILS 10.1 (H) 1.8 - 8.0 K/UL    ABS. LYMPHOCYTES 2.1 0.8 - 3.5 K/UL    ABS. MONOCYTES 1.2 (H) 0.0 - 1.0 K/UL    ABS. EOSINOPHILS 0.0 0.0 - 0.4 K/UL    ABS. BASOPHILS 0.0 0.0 - 0.1 K/UL    ABS. IMM.  GRANS. 0.0 0.00 - 0.04 K/UL    DF MANUAL      RBC COMMENTS ANISOCYTOSIS  1+        WBC COMMENTS ATYPICAL LYMPHOCYTES PRESENT     METABOLIC PANEL, COMPREHENSIVE    Collection Time: 08/22/22  4:16 PM   Result Value Ref Range    Sodium 129 (L) 136 - 145 mmol/L    Potassium 3.9 3.5 - 5.1 mmol/L    Chloride 98 97 - 108 mmol/L    CO2 22 21 - 32 mmol/L    Anion gap 9 5 - 15 mmol/L    Glucose 103 (H) 65 - 100 mg/dL    BUN 21 (H) 6 - 20 MG/DL    Creatinine 0.71 0.70 - 1.30 MG/DL    BUN/Creatinine ratio 30 (H) 12 - 20      GFR est AA >60 >60 ml/min/1.73m2    GFR est non-AA >60 >60 ml/min/1.73m2    Calcium 8.1 (L) 8.5 - 10.1 MG/DL    Bilirubin, total 0.7 0.2 - 1.0 MG/DL    ALT (SGPT) 29 12 - 78 U/L    AST (SGOT) 34 15 - 37 U/L    Alk.  phosphatase 188 (H) 45 - 117 U/L    Protein, total 5.1 (L) 6.4 - 8.2 g/dL    Albumin 2.3 (L) 3.5 - 5.0 g/dL    Globulin 2.8 2.0 - 4.0 g/dL    A-G Ratio 0.8 (L) 1.1 - 2.2     TROPONIN-HIGH SENSITIVITY    Collection Time: 08/22/22  4:16 PM   Result Value Ref Range    Troponin-High Sensitivity 8 0 - 76 ng/L

## 2022-08-22 NOTE — ED PROVIDER NOTES
EMERGENCY DEPARTMENT HISTORY AND PHYSICAL EXAM      Date: 8/22/2022  Patient Name: Jimmy Wahl. History of Presenting Illness     Chief Complaint   Patient presents with    Chest Pain         HPI: Jimmy Wahl., 67 y.o. male presents to the ED with cc of left-sided chest pain and left arm swelling. He is currently in a skilled nursing facility after being discharged from [address] [place] [address] on 8/19. Since he was in the hospital, he says he has been having some chest pain on the left side. Had a recent fall, however does not think that he hit his head. He describes as a sharp and is worse with breathing. He otherwise denies shortness of breath or fevers. Does report a mild cough, without purulent sputum. Also over the past 3 days has noting increasing swelling and pain in his left arm. He had a small skin tear there during the fall, however now states that his entire arm is red and swollen. There are no other complaints, changes, or physical findings at this time. PCP: Alexsandra Owen MD    No current facility-administered medications on file prior to encounter. Current Outpatient Medications on File Prior to Encounter   Medication Sig Dispense Refill    clobetasoL (TEMOVATE) 0.05 % ointment Apply  to affected area two (2) times a day. 30 g 0    clotrimazole (MYCELEX) 10 mg nicky DISSOLVE 1 NICKY SLOWLY IN MOUTH 5 TIMES A DAY FOR 14 DAYS GENERIC FOR MYCELEX      naproxen (NAPROSYN) 500 mg tablet Take 1 Tab by mouth every twelve (12) hours as needed for Pain. 20 Tab 0    methocarbamoL (Robaxin) 500 mg tablet Take 1 Tab by mouth four (4) times daily. 20 Tab 0    lidocaine 4 % patch 1 Patch by TransDERmal route every twelve (12) hours every twelve (12) hours. 10 Patch 0    losartan (COZAAR) 50 mg tablet Take  by mouth daily. amLODIPine (NORVASC) 5 mg tablet Take 5 mg by mouth daily. celecoxib (CELEBREX) 200 mg capsule Take  by mouth daily.       omega 3-dha-epa-fish oil (FISH OIL) 100-160-1,000 mg cap Take  by mouth daily. HYDROcodone-acetaminophen (NORCO) 5-325 mg per tablet Take 1 Tab by mouth every four (4) hours as needed for Pain. Max Daily Amount: 6 Tabs. 20 Tab 0    bisacodyl (DULCOLAX) 5 mg EC tablet Take 2 Tabs by mouth daily as needed for Constipation. 30 Tab 1    multivitamins-minerals-lutein (CENTRUM SILVER) Tab Take  by mouth daily. sodium chloride (OCEAN) 0.65 % nasal spray 1 Spray by Nasal route as needed. carvedilol (COREG) 25 mg tablet Take 25 mg by mouth two (2) times daily (with meals). simvastatin (ZOCOR) 20 mg tablet Take 20 mg by mouth nightly. hydrochlorothiazide (HYDRODIURIL) 25 mg tablet Take 25 mg by mouth daily.          Past History     Past Medical History:  Past Medical History:   Diagnosis Date    Arthritis     back,neck,shoulders    Arthritis     CAD (coronary artery disease)     per 10/2011 cardiology note    Cancer [facility]) Oct. 2011    prostate    Cardiomyopathy, alcoholic ([facility] [address]     per 01/2288 cardiology note    Essential hypertension     Hypercholesteremia     Hypertension     Other ill-defined conditions(799.89)     elevated cholesterol       Past Surgical History:  Past Surgical History:   Procedure Laterality Date    COLONOSCOPY N/A 11/8/2016    COLONOSCOPY performed by Irene Keating MD at [facility] ENDOSCOPY    HX HEENT      tonsillectomy    HX ORTHOPAEDIC      left ankle plate & screws    HX OTHER SURGICAL      colonoscopy    HX PROSTATE SURGERY      HX PROSTATECTOMY  10/19/11    ROBOTIC ASSISTED LAPAROSCOPIC PROSTATECTOMY WITH LEFT PELVIC LYMPH NODE DISSECTION    [place] CARDIAC SURG PROCEDURE UNLIST      cardiac cath x2 normal,2003    [place] PROSTATE BIOPSY, NEEDLE, SATURATION SAMPLING      08/17/2011       Family History:  Family History   Problem Relation Age of Onset    Seizures Mother     Stroke Mother        Social History:  Social History     Tobacco Use    Smoking status: Never    Smokeless tobacco: Never   Substance Use Topics Alcohol use: Yes    Drug use: Never       Allergies: Allergies   Allergen Reactions    Adhesive Itching     bandaide causes itching,irritates skin  (9/1/15 - patient states that bandaids only cause irritation if left on for long period. Tested negative for latex allergy)         Review of Systems   no fever  No ear pain  No eye pain  no shortness of breath  Reports left-sided chest pain  no abdominal pain  no dysuria  Reports left arm pain  No lymphadenopathy  No weight loss    Physical Exam   Physical Exam  Constitutional:       General: He is not in acute distress. Appearance: He is not toxic-appearing. HENT:      Head: Normocephalic and atraumatic. Eyes:      Extraocular Movements: Extraocular movements intact. Cardiovascular:      Rate and Rhythm: Normal rate and regular rhythm. Pulmonary:      Effort: Pulmonary effort is normal.      Breath sounds: Normal breath sounds. Chest:      Comments: Along in the left anterior chest wall, there are faint ecchymoses in a horizontal linear distribution that is very focally tender over this rib  Abdominal:      Palpations: Abdomen is soft. Tenderness: There is no abdominal tenderness. Musculoskeletal:      Cervical back: Neck supple. Comments: Right arm with small wound and overlying eschar, no significant surrounding swelling redness or tenderness. Left arm with skin tear over the anterior elbow region, dry eschar overlying, his left arm from the mid upper arm is diffusely red, swollen, and tender to the touch, warm as compared to the right. Good range of motion at the elbow and the wrist, elbow range of motion somewhat limited by swelling but no significant discomfort with range of motion. He has strong radial pulses bilaterally. Sensation to light touch intact over upper extremities bilaterally, full strength with wrist flexion extension on the left. Skin:     General: Skin is warm.    Neurological:      General: No focal deficit present. Mental Status: He is alert. Diagnostic Study Results     Labs -     Recent Results (from the past 24 hour(s))   EKG, 12 LEAD, INITIAL    Collection Time: 08/22/22  3:49 PM   Result Value Ref Range    Ventricular Rate 74 BPM    Atrial Rate 74 BPM    P-R Interval 146 ms    QRS Duration 150 ms    Q-T Interval 428 ms    QTC Calculation (Bezet) 475 ms    Calculated P Axis 17 degrees    Calculated R Axis -34 degrees    Calculated T Axis 93 degrees    Diagnosis       Sinus rhythm with premature atrial complexes  Left axis deviation  Left bundle branch block  When compared with ECG of 12-AUG-2022 15:23,  premature atrial complexes are now present     POC LACTIC ACID    Collection Time: 08/22/22  4:08 PM   Result Value Ref Range    Lactic Acid (POC) 0.86 0.40 - 2.00 mmol/L       Radiologic Studies -   DUPLEX UPPER EXT VENOUS LEFT    (Results Pending)   CTA CHEST W OR W WO CONT    (Results Pending)     CT Results  (Last 48 hours)      None          CXR Results  (Last 48 hours)      None              Medical Decision Making   I am the first provider for this patient. I reviewed the vital signs, available nursing notes, past medical history, past surgical history, family history and social history. Vital Signs-Reviewed the patient's vital signs. Patient Vitals for the past 24 hrs:   Temp Pulse Resp BP SpO2   08/22/22 1554 98.2 °F (36.8 °C) 76 18 (!) 133/51 93 %         Provider Notes (Medical Decision Making):   79-year-old male presenting with left-sided chest pain and left arm pain and swelling. He has some bruising and tenderness of the left chest, concerning for chest wall pain, potential contusion, rib fracture, however given his unclear history on his injuries during his recent falls, will obtain CT chest to assess for any underlying pulmonary injury or pathology. He has diffuse redness swelling and tenderness of the left arm, differential includes DVT, cellulitis, dependent edema, dermatitis. He is neurovascularly intact in this extremity. He is afebrile nontoxic-appearing, unlikely systemic infection. ED Course:     Initial assessment performed. The patients presenting problems have been discussed, and they are in agreement with the care plan formulated and outlined with them. I have encouraged them to ask questions as they arise throughout their visit. EKG is performed at 15: 49, shows sinus rhythm at a rate of 74, , , QTc 475, axis upright, no ST segment elevation or depression concerning for ACS, left bundle branch block present, PACs present, this is interpreted to sinus rhythm with left bundle branch block, left axis deviation and premature complexes. CBC shows leukocytosis of 13.4, lactic acid is not significant elevated at 0.86. Sister is now at bedside and is able to provide further history, she states that he has actually been having fevers of up to 102 in the last couple days, and that this redness and swelling of the left arm has rapidly progressed over several days. CT of the chest is reviewed, shows no PE, shows acute fracture of left seventh rib, also shows left lower lobe pulmonary nodule, patient and sister informed of the nodule. He is resting comfortably on reevaluation, given IV antibiotics, he will be admitted. Vital stable. Ultrasound shows left basilic vein thrombus. Critical Care Time:         Disposition:  Admit    PLAN:  1. Current Discharge Medication List        2.    Follow-up Information    None       Return to ED if worse     Diagnosis     Clinical Impression: Acute left rib fracture, acute left upper extremity cellulitis, acute left basilic vein thrombus

## 2022-08-22 NOTE — Clinical Note
TRANSFER - OUT REPORT:     Verbal report given to: (at bedside). Report consisted of patient's Situation, Background, Assessment and   Recommendations(SBAR). Opportunity for questions and clarification was provided. Patient transported with a Registered Nurse and [address] [place] [address] / [email or web address] [facility] [employer]. Patient transported to: [phone].

## 2022-08-22 NOTE — PROGRESS NOTES
[facility]  Preliminary Report:  Venous Duplex Arm    Left arm venous duplex was performed. Basilic Vein segment is Non-Compressible with Absent Doppler signals, suggesting Occlusive venous obstruction of the aforementioned vessel(s). Preliminary report verbally given to Dr. Damian Prado. Final report to follow.

## 2022-08-22 NOTE — ED TRIAGE NOTES
Pt arrived via EMS from [address] [place] [address] left sided cp with inspiration X2 weeks. Pt had a fall 2 weeks ago and has had the pain in the left side since. Reports left sided chest swelling as well. Denies hitting head. Pt also has swelling to the left arm with redness and warmth.  Reports a healing skin tear to the right arm after falls

## 2022-08-22 NOTE — PROGRESS NOTES
Pharmacy Automatic Renal Dosing Protocol - Antimicrobials    Indication for Antimicrobials: SSTI      Current Regimen of Each Antimicrobial:  Vancomycin 1500 mg x1 then 1250 mg Q12H  (Start Date 22; Day # 1)    Previous Antimicrobial Therapy:    Vancomycin Goal AUC: 400-600 mg*hour/liter per day     Vancomycin Levels  Date Dose & Interval Measured (mcg/mL) Steady State (mcg/mL)                       Date & time of next level:     Significant Cultures:     Radiology / Imaging results: (X-ray, CT scan or MRI):       Labs:  Recent Labs     22  1616   CREA 0.71   BUN 21*   WBC 13.4*     Temp (24hrs), Av.2 °F (36.8 °C), Min:98.2 °F (36.8 °C), Max:98.2 °F (36.8 °C)      Paralysis, amputations, malnutrition:   Creatinine Clearance (mL/min) or Dialysis:  93    Impression/Plan:  Antibiotic as above  BMP daily     Pharmacy will follow daily and adjust medications as appropriate for renal function and/or serum levels. Thank you,  Jo Dean, [facility]      Recommended duration of therapy  [email or web address]/[place]/[facility]/Pharmacy/Clinical%20Companion/Duration%20of%20ABX%20therapy. docx    Renal Dosing  [email or web address]/[place]/[facility]/Pharmacy/Clinical%20Companion/Renal%[email or web address]. pdf

## 2022-08-23 LAB
ACC. NO. FROM MICRO ORDER, ACCP: ABNORMAL
ACINETOBACTER CALCOACETICUS-BAUMANII COMPLEX, ACBCX: NOT DETECTED
ATRIAL RATE: 74 BPM
BACTEROIDES FRAGILIS, BFRA: NOT DETECTED
BIOFIRE COMMENT, BCIDPF: ABNORMAL
C GLABRATA DNA VAG QL NAA+PROBE: NOT DETECTED
CALCULATED P AXIS, ECG09: 17 DEGREES
CALCULATED R AXIS, ECG10: -34 DEGREES
CALCULATED T AXIS, ECG11: 93 DEGREES
CANDIDA ALBICANS: NOT DETECTED
CANDIDA AURIS, CAAU: NOT DETECTED
CANDIDA KRUSEI, CKRP: NOT DETECTED
CANDIDA PARAPSILOSIS, CPAUP: NOT DETECTED
CANDIDA TROPICALIS, CTROP: NOT DETECTED
CRYPTO NEOFORMANS/GATTII, CRYNEG: NOT DETECTED
DIAGNOSIS, 93000: NORMAL
ENTEROBACTER CLOACAE COMPLEX, ECCP: NOT DETECTED
ENTEROBACTERALES SP. , ENBLS: NOT DETECTED
ENTEROCOCCUS FAECALIS, ENFA: NOT DETECTED
ENTEROCOCCUS FAECIUM, ENFAM: NOT DETECTED
ESCHERICHIA COLI: NOT DETECTED
HAEMOPHILUS INFLUENZAE, HMI: NOT DETECTED
KLEBSIELLA AEROGENES, KLAE: NOT DETECTED
KLEBSIELLA OXYTOCA: NOT DETECTED
KLEBSIELLA PNEUMONIAE GROUP, KPPG: NOT DETECTED
LISTERIA MONOCYTOGENES, LMONP: NOT DETECTED
MECA/C AND MREJ (METHICILLIN RESISTANT GENE), MECAC: NOT DETECTED
NEISSERIA MENINGITIDIS, NMNI: NOT DETECTED
P-R INTERVAL, ECG05: 146 MS
PROTEUS, PRP: NOT DETECTED
PSEUDOMONAS AERUGINOSA: NOT DETECTED
Q-T INTERVAL, ECG07: 428 MS
QRS DURATION, ECG06: 150 MS
QTC CALCULATION (BEZET), ECG08: 475 MS
RESISTANT GENE SPACE, REGENE: ABNORMAL
SALMONELLA, SALMO: NOT DETECTED
SERRATIA MARCESCENS: NOT DETECTED
STAPH EPIDERMIDIS, STEP: NOT DETECTED
STAPH LUGDUNENSIS, STALUG: NOT DETECTED
STAPHYLOCOCCUS AUREUS: DETECTED
STAPHYLOCOCCUS, STAPP: DETECTED
STENO MALTOPHILIA, STMA: NOT DETECTED
STREPTOCOCCUS , STPSP: NOT DETECTED
STREPTOCOCCUS AGALACTIAE (GROUP B): NOT DETECTED
STREPTOCOCCUS PNEUMONIAE , SPNP: NOT DETECTED
STREPTOCOCCUS PYOGENES (GROUP A), SPYOP: NOT DETECTED
VENTRICULAR RATE, ECG03: 74 BPM

## 2022-08-23 PROCEDURE — 74011000250 HC RX REV CODE- 250: Performed by: INTERNAL MEDICINE

## 2022-08-23 PROCEDURE — 74011250637 HC RX REV CODE- 250/637: Performed by: INTERNAL MEDICINE

## 2022-08-23 PROCEDURE — 74011000258 HC RX REV CODE- 258: Performed by: INTERNAL MEDICINE

## 2022-08-23 PROCEDURE — 74011250636 HC RX REV CODE- 250/636: Performed by: INTERNAL MEDICINE

## 2022-08-23 PROCEDURE — 97530 THERAPEUTIC ACTIVITIES: CPT

## 2022-08-23 PROCEDURE — 97530 THERAPEUTIC ACTIVITIES: CPT | Performed by: OCCUPATIONAL THERAPIST

## 2022-08-23 PROCEDURE — 65270000029 HC RM PRIVATE

## 2022-08-23 PROCEDURE — 97161 PT EVAL LOW COMPLEX 20 MIN: CPT

## 2022-08-23 PROCEDURE — 97165 OT EVAL LOW COMPLEX 30 MIN: CPT | Performed by: OCCUPATIONAL THERAPIST

## 2022-08-23 RX ORDER — DEXTROMETHORPHAN HYDROBROMIDE, GUAIFENESIN 5; 100 MG/5ML; MG/5ML
650 LIQUID ORAL
COMMUNITY
End: 2022-09-20 | Stop reason: ALTCHOICE

## 2022-08-23 RX ORDER — CETIRIZINE HCL 10 MG
10 TABLET ORAL DAILY
COMMUNITY

## 2022-08-23 RX ORDER — CETIRIZINE HCL 10 MG
10 TABLET ORAL DAILY
Status: DISCONTINUED | OUTPATIENT
Start: 2022-08-24 | End: 2022-08-29 | Stop reason: HOSPADM

## 2022-08-23 RX ORDER — FLUOROURACIL 50 MG/G
1 CREAM TOPICAL 2 TIMES DAILY
COMMUNITY
End: 2022-09-20 | Stop reason: ALTCHOICE

## 2022-08-23 RX ORDER — OXYCODONE HYDROCHLORIDE 5 MG/1
2.5 TABLET ORAL
Status: DISCONTINUED | OUTPATIENT
Start: 2022-08-23 | End: 2022-08-29 | Stop reason: HOSPADM

## 2022-08-23 RX ORDER — FACIAL-BODY WIPES
10 EACH TOPICAL DAILY PRN
Status: DISCONTINUED | OUTPATIENT
Start: 2022-08-23 | End: 2022-08-29 | Stop reason: HOSPADM

## 2022-08-23 RX ORDER — KETOROLAC TROMETHAMINE 30 MG/ML
15 INJECTION, SOLUTION INTRAMUSCULAR; INTRAVENOUS
Status: DISPENSED | OUTPATIENT
Start: 2022-08-23 | End: 2022-08-28

## 2022-08-23 RX ORDER — TRAZODONE HYDROCHLORIDE 50 MG/1
50 TABLET ORAL
COMMUNITY
End: 2022-09-20 | Stop reason: ALTCHOICE

## 2022-08-23 RX ORDER — GABAPENTIN 100 MG/1
100 CAPSULE ORAL 3 TIMES DAILY
COMMUNITY

## 2022-08-23 RX ORDER — MUPIROCIN 20 MG/G
1 OINTMENT TOPICAL 2 TIMES DAILY
COMMUNITY
End: 2022-09-20 | Stop reason: ALTCHOICE

## 2022-08-23 RX ORDER — CARVEDILOL 3.12 MG/1
3.12 TABLET ORAL 2 TIMES DAILY WITH MEALS
Status: DISCONTINUED | OUTPATIENT
Start: 2022-08-23 | End: 2022-08-27

## 2022-08-23 RX ORDER — TRAZODONE HYDROCHLORIDE 50 MG/1
50 TABLET ORAL
Status: DISCONTINUED | OUTPATIENT
Start: 2022-08-23 | End: 2022-08-29 | Stop reason: HOSPADM

## 2022-08-23 RX ORDER — PETROLATUM 42 G/100G
1 OINTMENT TOPICAL AS NEEDED
COMMUNITY
End: 2022-09-20 | Stop reason: ALTCHOICE

## 2022-08-23 RX ORDER — POLYETHYLENE GLYCOL 3350 17 G/17G
17 POWDER, FOR SOLUTION ORAL DAILY
COMMUNITY
Start: 2022-08-20 | End: 2022-08-29

## 2022-08-23 RX ORDER — TRIAMCINOLONE ACETONIDE 1 MG/G
1 CREAM TOPICAL AS NEEDED
COMMUNITY
End: 2022-09-20 | Stop reason: ALTCHOICE

## 2022-08-23 RX ORDER — AMLODIPINE BESYLATE 5 MG/1
10 TABLET ORAL DAILY
Status: DISCONTINUED | OUTPATIENT
Start: 2022-08-24 | End: 2022-08-27

## 2022-08-23 RX ORDER — SENNOSIDES 8.6 MG/1
1 TABLET ORAL
COMMUNITY
End: 2022-09-20 | Stop reason: ALTCHOICE

## 2022-08-23 RX ORDER — GABAPENTIN 100 MG/1
100 CAPSULE ORAL 3 TIMES DAILY
Status: DISCONTINUED | OUTPATIENT
Start: 2022-08-23 | End: 2022-08-29 | Stop reason: HOSPADM

## 2022-08-23 RX ORDER — MORPHINE SULFATE 2 MG/ML
0.5 INJECTION, SOLUTION INTRAMUSCULAR; INTRAVENOUS
Status: DISCONTINUED | OUTPATIENT
Start: 2022-08-23 | End: 2022-08-29 | Stop reason: HOSPADM

## 2022-08-23 RX ORDER — DOXYCYCLINE 100 MG/1
100 CAPSULE ORAL 2 TIMES DAILY
COMMUNITY
End: 2022-09-20 | Stop reason: ALTCHOICE

## 2022-08-23 RX ADMIN — SODIUM CHLORIDE 100 ML/HR: 9 INJECTION, SOLUTION INTRAVENOUS at 00:28

## 2022-08-23 RX ADMIN — CARVEDILOL 3.12 MG: 3.12 TABLET, FILM COATED ORAL at 17:37

## 2022-08-23 RX ADMIN — BISACODYL 10 MG: 10 SUPPOSITORY RECTAL at 22:03

## 2022-08-23 RX ADMIN — LACTULOSE 15 ML: 20 SOLUTION ORAL at 15:30

## 2022-08-23 RX ADMIN — SODIUM CHLORIDE 1 G: 900 INJECTION INTRAVENOUS at 08:23

## 2022-08-23 RX ADMIN — GABAPENTIN 100 MG: 100 CAPSULE ORAL at 22:03

## 2022-08-23 RX ADMIN — CARVEDILOL 25 MG: 12.5 TABLET, FILM COATED ORAL at 00:29

## 2022-08-23 RX ADMIN — KETOROLAC TROMETHAMINE 15 MG: 30 INJECTION, SOLUTION INTRAMUSCULAR; INTRAVENOUS at 15:30

## 2022-08-23 RX ADMIN — ENOXAPARIN SODIUM 40 MG: 100 INJECTION SUBCUTANEOUS at 08:23

## 2022-08-23 RX ADMIN — AMLODIPINE BESYLATE 5 MG: 5 TABLET ORAL at 08:23

## 2022-08-23 RX ADMIN — VANCOMYCIN HYDROCHLORIDE 1250 MG: 10 INJECTION, POWDER, LYOPHILIZED, FOR SOLUTION INTRAVENOUS at 05:09

## 2022-08-23 RX ADMIN — OXYCODONE 2.5 MG: 5 TABLET ORAL at 19:58

## 2022-08-23 RX ADMIN — GABAPENTIN 100 MG: 100 CAPSULE ORAL at 17:37

## 2022-08-23 RX ADMIN — SODIUM CHLORIDE, PRESERVATIVE FREE 10 ML: 5 INJECTION INTRAVENOUS at 22:00

## 2022-08-23 RX ADMIN — SODIUM CHLORIDE, PRESERVATIVE FREE 10 ML: 5 INJECTION INTRAVENOUS at 05:09

## 2022-08-23 RX ADMIN — ATORVASTATIN CALCIUM 10 MG: 10 TABLET, FILM COATED ORAL at 08:23

## 2022-08-23 RX ADMIN — ACETAMINOPHEN 650 MG: 325 TABLET ORAL at 19:58

## 2022-08-23 RX ADMIN — TRAZODONE HYDROCHLORIDE 50 MG: 50 TABLET ORAL at 22:03

## 2022-08-23 RX ADMIN — VANCOMYCIN HYDROCHLORIDE 1250 MG: 10 INJECTION, POWDER, LYOPHILIZED, FOR SOLUTION INTRAVENOUS at 19:45

## 2022-08-23 RX ADMIN — CARVEDILOL 25 MG: 12.5 TABLET, FILM COATED ORAL at 08:23
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
## 2022-08-23 NOTE — PROGRESS NOTES
Pharmacy Medication Reconciliation     The patient was NOT interviewed regarding current PTA medication list, use and drug allergies;  SNF paperwork [facility] and rehab)    Allergy Update: Adhesive    Recommendations/Findings: The following amendments were made to the patient's active medication list on file at [facility]:   1) Additions:   +all medications below  2) Deletions:   -amlodipine  -bisacodyl  -carvedilol  -celebrex  -clobetasol  -clotrimazole  -hydrochlorothiazide  -lidocaine patch  -ocean nasal spray  -simvastatin  -fish oil  -naproxen  -norco  -losartan  -methocarbamol  -multivitamin  3) Changes:  none  Pertinent Findings:  none    Clarified PTA med list with SNF paperwork, 8/19/22 vcu discharge. PTA medication list was corrected to the following:     Prior to Admission Medications   Prescriptions Last Dose Informant Taking?   acetaminophen (TYLENOL) 650 mg TbER  Transfer Papers Yes   Sig: Take 650 mg by mouth every six (6) hours as needed for Pain or Fever. camphor-menthoL (SARNA) 0.5-0.5 % lotion  Transfer Papers Yes   Sig: Apply 1 Each to affected area as needed for Itching. Apply to chest, back, arms as needed itching   cetirizine (ZYRTEC) 10 mg tablet  Transfer Papers Yes   Sig: Take 10 mg by mouth daily. doxycycline (MONODOX) 100 mg capsule  Transfer Papers Yes   Sig: Take 100 mg by mouth two (2) times a day. Left arm cellulitis   fluorouraciL (EFUDEX) 5 % chemo cream  Transfer Papers Yes   Sig: Apply 1 Each to affected area two (2) times a day. Apply to right anterior shoulder x 6 weeks   gabapentin (NEURONTIN) 100 mg capsule  Transfer Papers Yes   Sig: Take 100 mg by mouth three (3) times daily. mineral oil-hydrophil petrolat (AQUAPHOR) ointment  Transfer Papers Yes   Sig: Apply 1 Each to affected area as needed for Itching. Apply to back, chest, arms prn itching   mupirocin (BACTROBAN) 2 % ointment  Transfer Papers Yes   Sig: Apply 1 g to affected area two (2) times a day.  Apply to back, chest, arms   mupirocin (BACTROBAN) 2 % ointment  Transfer Papers Yes   Sig: Apply 1 g to affected area two (2) times a day. To right anterior should biopsy site   polyethylene glycol (MIRALAX) 17 gram packet  Transfer Papers Yes   Sig: Take 17 g by mouth daily. senna (SENOKOT) 8.6 mg tablet  Transfer Papers Yes   Sig: Take 1 Tablet by mouth nightly. traZODone (DESYREL) 50 mg tablet  Transfer Papers Yes   Sig: Take 50 mg by mouth nightly. triamcinolone acetonide (KENALOG) 0.1 % topical cream  Transfer Papers Yes   Sig: Apply 1 g to affected area as needed for Skin Irritation or Itching.  Use thin layer to arms, back, as needed for itching      Facility-Administered Medications: None        Thank you,  FIORELLA Lee

## 2022-08-23 NOTE — PROGRESS NOTES
Patient admitted to unit without incident. LUE swollen, red, warm to touch, appears to be blisters forming on inner arm. Both arms have several abrasions. Wound care consult already ordered. Heels floated. No c/o pain or distress. Report given to YANCI Porter.

## 2022-08-23 NOTE — PROGRESS NOTES
Pharmacy Automatic Renal Dosing Protocol - Antimicrobials    Indication for Antimicrobials: SSTI      Current Regimen of Each Antimicrobial:  Vancomycin 1500 mg x1 then 1250 mg Q12H  (Start Date 22; Day # 2)  Ceftriaxone 1g q 24h (start: -)    Previous Antimicrobial Therapy:    Vancomycin Goal AUC: 400-600 mg*hour/liter per day     Vancomycin Levels  Date Dose & Interval Measured (mcg/mL) Steady State (mcg/mL)                       Date & time of next level:     Significant Cultures:     Radiology / Imaging results: (X-ray, CT scan or MRI):       Labs:  Recent Labs     22  1616   CREA 0.71   BUN 21*   WBC 13.4*     Temp (24hrs), Av.1 °F (36.7 °C), Min:98 °F (36.7 °C), Max:98.4 °F (36.9 °C)      Paralysis, amputations, malnutrition:   Creatinine Clearance (mL/min) or Dialysis:  93    Impression/Plan:  Antibiotic as above  BMP daily     Pharmacy will follow daily and adjust medications as appropriate for renal function and/or serum levels. Thank you,  Marge Davis, [place]      Recommended duration of therapy  http://[facility]/[place]/[facility]/Pharmacy/Clinical%20Companion/Duration%20of%20ABX%20therapy. docx    Renal Dosing  http://[facility]/[place]/[facility]/Pharmacy/Clinical%20Companion/Renal%[id number]. pdf

## 2022-08-23 NOTE — PROGRESS NOTES
Pt suffering with too much upper extremity pain to draw labs.   Will delay and provide pain relief prior to draw

## 2022-08-23 NOTE — PROGRESS NOTES
Hospitalist Progress Note    NAME: Shira Cota. :  1945   MRN:  [id number]       Assessment / Plan:  Musculoskeletal chest pain, POA  Recent mechanical fall, POA  Acute fracture of the left seventh seventh costochondral junction, POA  - Presented with musculoskeletal chest pain in nature  - CTA with acute fracture of the left seventh rib costochondral junction (he fell on  and was admitted to [facility], discharged to rehab on )  - EKG showed SR with PACs  - check orthostatic vitals. 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 Wbc 13K on admission  -check UA, procal  - add IV Rocephin, cont' IV Vanc  - Duplex ultrasound preliminary results with thrombus within the left basilic upper arm veins. No deep vein thrombosis reported  - Supportive care with left arm elevation and warm/cold compression, prn ketorolac    Coronary artery disease  Hypertension  -cont' amlodipine, incr dose. Lower coreg (low HR at [facility]). Adjust as needed. Will need to simplify his meds at discharge  -pharmacy to help with med recs    Hyperlipidemia  - Continue home statins     Wounds on L/R arms from fall  Wound care     Constipation  No bm for 5 days, requesting suppository  Will add lactulose prn    Chronic atopic dermatitis   Follows with derm at [facility]    BARBARA:  pending clinical improvement    Updated pt's 2 sisters at bedside       Code Status: Full code  Surrogate Decision Maker: pt's sisters  DVT Prophylaxis: Lovenox  GI Prophylaxis: not indicated     Baseline: Active, lives alone, came from rehab facility     Subjective:     Chief Complaint / Reason for Physician Visit  NAD. C/o o L arm pain. Discussed with RN events overnight. Review of Systems:  Symptom Y/N Comments  Symptom Y/N Comments   Fever/Chills    Chest Pain     Poor Appetite    Edema     Cough    Abdominal Pain     Sputum    Joint Pain     SOB/BROTHERS    Pruritis/Rash     Nausea/vomit    Tolerating PT/OT     Diarrhea    Tolerating Diet     Constipation    Other       Could NOT obtain due to:      Objective:     VITALS:   Last 24hrs VS reviewed since prior progress note.  Most recent are:  Patient Vitals for the past 24 hrs:   Temp Pulse Resp BP SpO2   08/23/22 1207 -- -- -- (!) 158/78 93 %   08/23/22 1201 -- -- -- (!) 162/54 --   08/23/22 1151 -- 70 21 (!) 140/45 94 %   08/23/22 0734 -- 78 21 (!) 154/50 97 %   08/23/22 0700 98 °F (36.7 °C) 79 24 (!) 171/52 98 %   08/23/22 0634 -- 86 13 -- 96 %   08/23/22 0624 -- 79 12 -- 96 %   08/23/22 0614 -- 78 12 -- 96 %   08/23/22 0604 -- 83 19 (!) 151/53 93 %   08/23/22 0554 -- 83 22 -- 97 %   08/23/22 0544 -- 73 11 -- 96 %   08/23/22 0534 -- 83 17 -- 94 %   08/23/22 0524 -- 81 10 -- 96 %   08/23/22 0514 -- 86 12 -- 95 %   08/23/22 0504 -- 78 15 (!) 169/63 95 %   08/23/22 0454 -- 85 17 -- 96 %   08/23/22 0444 -- 93 16 -- 97 %   08/23/22 0434 -- 94 19 -- 94 %   08/23/22 0424 -- 71 12 -- 95 %   08/23/22 0414 -- 75 14 -- 91 %   08/23/22 0404 -- 72 15 (!) 152/56 94 %   08/23/22 0354 -- 78 14 -- 95 %   08/23/22 0344 -- 81 19 -- 94 %   08/23/22 0334 -- 75 14 -- 92 %   08/23/22 0324 -- 74 12 -- 92 %   08/23/22 0314 -- 70 13 -- 93 %   08/23/22 0304 98 °F (36.7 °C) 71 15 (!) 111/52 94 %   08/23/22 0254 -- 84 15 -- 91 %   08/23/22 0244 -- 74 12 -- 93 %   08/23/22 0234 -- 83 13 -- 93 %   08/23/22 0224 -- 80 17 -- 93 %   08/23/22 0214 -- 85 12 -- (!) 89 %   08/23/22 0204 -- 85 12 (!) 159/49 94 %   08/23/22 0154 -- 87 17 -- 94 %   08/23/22 0144 -- 88 12 -- 94 %   08/23/22 0134 -- 83 13 -- 93 %   08/23/22 0124 -- -- -- -- 95 %   08/23/22 0114 -- 90 13 -- 93 %   08/23/22 0104 -- 87 14 (!) 151/65 94 %   08/23/22 0054 -- 90 13 -- 92 %   08/23/22 0044 -- 94 15 -- 93 %   08/23/22 0034 98 °F (36.7 °C) 95 16 (!) 171/63 94 %   08/23/22 0024 -- 87 15 -- 94 %   08/23/22 0014 -- 81 13 -- 94 %   08/23/22 0004 -- 77 14 90/60 94 %   08/22/22 2354 -- 77 18 -- 95 %   08/22/22 2344 -- 81 13 (!) 138/47 93 %   08/22/22 2334 -- 74 12 131/63 94 %   08/22/22 2324 -- 79 16 -- 91 %   08/22/22 2314 -- 79 13 (!) 113/50 91 %   08/22/22 2304 -- 82 13 (!) 135/52 94 %   08/22/22 2254 -- 90 11 -- 94 %   08/22/22 2244 -- 80 13 (!) 113/54 93 %   08/22/22 2234 -- 84 12 (!) 128/39 94 %   08/22/22 2224 -- 86 12 -- 94 %   08/22/22 2214 -- 85 14 (!) 130/37 96 %   08/22/22 2204 -- 90 14 (!) 140/45 95 %   08/22/22 2154 -- 88 19 -- 93 %   08/22/22 2144 -- 93 15 (!) 125/48 93 %   08/22/22 2134 -- 100 15 (!) 131/48 95 %   08/22/22 2124 -- 92 15 -- 97 %   08/22/22 2114 -- 95 16 -- 93 %   08/22/22 2104 -- 89 18 -- 94 %   08/22/22 2054 -- 85 15 -- 93 %   08/22/22 2044 -- 88 17 -- 94 %   08/22/22 2034 -- 87 13 -- 95 %   08/22/22 2014 -- 88 16 (!) 140/51 --   08/22/22 2004 -- 85 14 (!) 134/53 --   08/22/22 1954 -- 86 14 -- --   08/1945 -- 90 15 (!) 141/54 --   08/22/22 1934 98.4 °F (36.9 °C) 88 12 (!) 144/54 95 %   08/22/22 1924 -- 79 13 -- 96 %   08/22/22 1915 -- 79 11 (!) 137/54 95 %   08/22/22 1914 -- 81 13 -- 95 %   08/22/22 1904 -- 76 12 -- 93 %   08/22/22 1900 -- 80 13 (!) 157/46 95 %   08/22/22 1854 -- 80 12 -- (!) 88 %   08/22/22 1844 -- 78 15 (!) 140/54 90 %   08/22/22 1842 -- -- -- (!) 148/47 --   08/22/22 1643 -- 74 12 -- 95 %   08/22/22 1554 98.2 °F (36.8 °C) 76 18 (!) 133/51 93 %     No intake or output data in the 24 hours ending 08/23/22 1304     I had a face to face encounter and independently examined this patient on 8/23/2022, as outlined below:  PHYSICAL EXAM:  General: WD, WN. Alert, cooperative, no acute distress    EENT:  EOMI. Anicteric sclerae. MMM  Resp:  CTA bilaterally, no wheezing or rales.   No accessory muscle use  CV:  Regular  rhythm,  L arm edema  GI:  Soft, Non distended, Non tender. +Bowel sounds  Neurologic:  Alert and oriented X 3, normal speech  Psych:   Good insight. Not anxious nor agitated  Skin:  Skin tears and wound son b/l arms. Erythema and swelling on L arm. Reviewed most current lab test results and cultures  YES  Reviewed most current radiology test results   YES  Review and summation of old records today    NO  Reviewed patient's current orders and MAR    YES  PMH/SH reviewed - no change compared to H&P  ________________________________________________________________________  Care Plan discussed with:    Comments   Patient x    Family  x    RN x    Care Manager     Consultant                        Multidiciplinary team rounds were held today with , nursing, pharmacist and clinical coordinator. Patient's plan of care was discussed; medications were reviewed and discharge planning was addressed. ________________________________________________________________________  Total NON critical care TIME:  35 Minutes    Total CRITICAL CARE TIME Spent:   Minutes non procedure based      Comments   >50% of visit spent in counseling and coordination of care     ________________________________________________________________________  Aminta Jackson MD     Procedures: see electronic medical records for all procedures/Xrays and details which were not copied into this note but were reviewed prior to creation of Plan. LABS:  I reviewed today's most current labs and imaging studies.   Pertinent labs include:  Recent Labs     08/22/22  1616   WBC 13.4*   HGB 8.9*   HCT 25.7*        Recent Labs     08/22/22  1616   *   K 3.9   CL 98   CO2 22   *   BUN 21*   CREA 0.71   CA 8.1*   ALB 2.3*   TBILI 0.7   ALT 29       Signed: Aminta Jackson MD

## 2022-08-23 NOTE — PROGRESS NOTES
Problem: Self Care Deficits Care Plan (Adult)  Goal: *Acute Goals and Plan of Care (Insert Text)  Description: FUNCTIONAL STATUS PRIOR TO ADMISSION: was at SNF rehab due to prior hospital stay and fall at home, prior to this pt was living home alone, driving, performing ADLS and IADLS without assist, furniture walking and holding onto walls    HOME SUPPORT PRIOR TO ADMISSION: The patient lived alone. Occupational Therapy Goals:  Initiated 8/23/2022  1. Patient will perform grooming standing with supervision/set-up within 7 days. 2. Patient will perform lower body dressing with contact guard assist within 7 days. 3. Patient will perform toileting with contact guard assist within 7 days. 4. Patient will demonstrate independence with home exercise program within 7 days. 5. Patient will perform UB dressing with supervision/set up within 7 days. 6. Patient will transfer from toilet with supervision/set-up using the least restrictive device and appropriate durable medical equipment within 7 days. Outcome: Not Met   OCCUPATIONAL THERAPY EVALUATION  Patient: Enrico Kehr. (78 y.o. male)  Date: 8/23/2022  Primary Diagnosis: Rib fracture [S22.39XA]       Precautions: fall  Fall    ASSESSMENT  Based on the objective data described below, the patient presents with impulsivity with attempt at mobility today and was very fixated on contacting his sister whom left to go  [place]. He reported that his is [address] and wanted his sister to answer the PLOF questions. Pt was informed that all questions were answered by him and that it wasn't necessary to contact family that wasn't in the building. After being assisted to edge of stretcher with pt was fidgeting with the sheets and twisting left and right with his trunk to look for the phone. Cues needed for pt to stay still to get Bp taken. All vitals were stable and O2 sat was 95% on room air.   Min assist needed for sit to stand from stretcher surface (elevated). Once standing pt was reaching out for objects to steady himself and needed reinforcement to hold onto therapist hand for support. Min assist needed to side step head of stretcher with hand held assist x2. Pt is very unsteady on his feet and is at a high risk for further falls. He lives alone and will need to return to rehab at discharge. Pt may need further caregiver assist when discharging from rehab pending progress in rehab. Current Level of Function Impacting Discharge (ADLs/self-care): max assist bed mobility, min assist sit to stand (elevated surface), min assist to side step head of bed    Feeding: Minimum assistance    Oral Facial Hygiene/Grooming: Minimum assistance    Bathing: Maximum assistance    Upper Body Dressing: Maximum assistance    Lower Body Dressing: Maximum assistance    Toileting: Maximum assistance       Functional Outcome Measure: The patient scored 25/100 on the barthel outcome measure which is indicative of significant decline in mobility and ADLs. Other factors to consider for discharge: frequency of falls, lives alone      Patient will benefit from skilled therapy intervention to address the above noted impairments. PLAN :  Recommendations and Planned Interventions: self care training, functional mobility training, therapeutic exercise, balance training, therapeutic activities, neuromuscular re-education, patient education, home safety training, and family training/education    Frequency/Duration: Patient will be followed by occupational therapy 4 times a week to address goals.     Recommendation for discharge: (in order for the patient to meet his/her long term goals)  Therapy up to 5 days/week in SNF setting    This discharge recommendation:  Has not yet been discussed the attending provider and/or case management    IF patient discharges home will need the following DME: wheelchair, bedside commode, rolling walker, gait belt, hospital bed       SUBJECTIVE:   Patient stated Dory Argueta will do anything you ask, but I will need help.     OBJECTIVE DATA SUMMARY:   HISTORY:   Past Medical History:   Diagnosis Date    Arthritis     back,neck,shoulders    Arthritis     CAD (coronary artery disease)     per 10/2011 cardiology note    Cancer [facility]) Oct. 2011    prostate    Cardiomyopathy, alcoholic ([facility] [address]     per 60/8273 cardiology note    Essential hypertension     Hypercholesteremia     Hypertension     Other ill-defined conditions(799.89)     elevated cholesterol     Past Surgical History:   Procedure Laterality Date    COLONOSCOPY N/A 11/8/2016    COLONOSCOPY performed by Gage Ferguson MD at [facility] ENDOSCOPY    HX HEENT      tonsillectomy    HX ORTHOPAEDIC      left ankle plate & screws    HX OTHER SURGICAL      colonoscopy    HX PROSTATE SURGERY      HX PROSTATECTOMY  10/19/11    ROBOTIC ASSISTED LAPAROSCOPIC PROSTATECTOMY WITH LEFT PELVIC LYMPH NODE DISSECTION    NM CARDIAC SURG PROCEDURE UNLIST      cardiac cath x2 normal,2003    NM PROSTATE BIOPSY, NEEDLE, SATURATION SAMPLING      08/17/2011       Expanded or extensive additional review of patient history:     Home Situation  Home Environment: [address] [place] [address] Name: [address]    Hand dominance: Right    EXAMINATION OF PERFORMANCE DEFICITS:  Cognitive/Behavioral Status:  Neurologic State: Alert  Orientation Level: Oriented X4  Cognition: Decreased attention/concentration; Follows commands; Impulsive;Poor safety awareness  Perception: Cues to maintain midline in standing  Perseveration: Perseverates during conversation  Safety/Judgement: Fall prevention;Decreased awareness of need for safety    Skin: left UE severely swollen and red (cellulitis and basilic vein clot)    Edema: severe left UE    Hearing:   Solomon    Vision/Perceptual:                                Corrective Lenses: Glasses    Range of Motion:    AROM: Generally decreased, functional  PROM: Generally decreased, functional                      Strength:    Strength: Generally decreased, functional                Coordination:  Coordination: Generally decreased, functional  Fine Motor Skills-Upper: Left Intact; Right Intact    Gross Motor Skills-Upper: Left Impaired;Right Intact (LUE swelling)    Tone & Sensation:    Tone: Normal  Sensation: Intact                      Balance:  Sitting: Intact  Standing: Impaired  Standing - Static: Constant support; Fair  Standing - Dynamic : Constant support; Fair    Functional Mobility and Transfers for ADLs:  Bed Mobility:  Rolling: Maximum assistance  Supine to Sit: Maximum assistance  Sit to Supine: Maximum assistance  Scooting: Moderate assistance    Transfers:  Sit to Stand: Minimum assistance (from stretcher height)  Stand to Sit: Minimum assistance  Bed to Chair:  (min hand held assist x2 to side step edge of stretcher)  Bathroom Mobility:  (unable at this time)    ADL Assessment:  Feeding: Minimum assistance    Oral Facial Hygiene/Grooming: Minimum assistance    Bathing: Maximum assistance    Upper Body Dressing: Maximum assistance    Lower Body Dressing: Maximum assistance    Toileting: Maximum assistance      Cognitive Retraining  Safety/Judgement: Fall prevention;Decreased awareness of need for safety    Functional Measure:    Barthel Index:  Bathin  Bladder: 5  Bowels: 5  Groomin  Dressin  Feedin  Mobility: 0  Stairs: 0  Toilet Use: 0  Transfer (Bed to Chair and Back): 10  Total: 25/100      The Barthel ADL Index: Guidelines  1. The index should be used as a record of what a patient does, not as a record of what a patient could do. 2. The main aim is to establish degree of independence from any help, physical or verbal, however minor and for whatever reason. 3. The need for supervision renders the patient not independent. 4. A patient's performance should be established using the best available evidence.  Asking the patient, friends/relatives and nurses are the usual sources, but direct observation and common sense are also important. However direct testing is not needed. 5. Usually the patient's performance over the preceding 24-48 hours is important, but occasionally longer periods will be relevant. 6. Middle categories imply that the patient supplies over 50 per cent of the effort. 7. Use of aids to be independent is allowed. Score Interpretation (from [address])    Independent   60-79 Minimally independent   40-59 Partially dependent   20-39 Very dependent   <20 Totally dependent     -Ebonie Ocampo., Barthel, D.W. (1965). Functional evaluation: the Barthel Index. [address] ([address] (1997). The Barthel activities of daily living index: self-reporting versus actual performance in the old (> or = 75 years). Journal of [address] [place] [address] 45(7), [address] [facility], ANA ROSA, Brock Zimmerman., May Nash. (1999). Measuring the change in disability after inpatient rehabilitation; comparison of the responsiveness of the Barthel Index and Functional [place] Measure. Journal of Neurology, Neurosurgery, and Psychiatry, 66(4), 421-976. Ivanna Perez, N.J.A, JAYE Vanegas, & Carrillo Downey MAbiA. (2004) Assessment of post-stroke quality of life in cost-effectiveness studies: The usefulness of the Barthel Index and the EuroQoL-5D. Quality of Life Research, 15, 979-71         Occupational Therapy Evaluation Charge Determination   History Examination Decision-Making   MEDIUM Complexity : Expanded review of history including physical, cognitive and psychosocial  history  MEDIUM Complexity : 3-5 performance deficits relating to physical, cognitive , or psychosocial skils that result in activity limitations and / or participation restrictions MEDIUM Complexity : Patient may present with comorbidities that affect occupational performnce.  Miniml to moderate modification of tasks or assistance (eg, physical or verbal ) with assesment(s) is necessary to enable patient to complete evaluation       Based on the above components, the patient evaluation is determined to be of the following complexity level: MEDIUM  Pain Rating:  Left rib pain and LUE pain mild with activity    Activity Tolerance:   Fair and requires rest breaks    After treatment patient left in no apparent distress:    Supine in bed and Call bell within reach    COMMUNICATION/EDUCATION:   The patients plan of care was discussed with: Physical therapist, Registered nurse, and patient . This patients plan of care is appropriate for delegation to [facility].     Thank you for this referral.  Wilfrid Kiran OTR/L  Time Calculation: 20 mins

## 2022-08-23 NOTE — PROGRESS NOTES
0700- assumed care of pt this am. Pt is alert and oriented resting in bed at this time. Pt LUE is red, swollen and painful.  1705- pt refused AM labs per report.  Pt to have pro josé antonio lab drawn, messaged MD to see if lab can be done in am.

## 2022-08-24 LAB
ANION GAP SERPL CALC-SCNC: 7 MMOL/L (ref 5–15)
APPEARANCE UR: ABNORMAL
BACTERIA URNS QL MICRO: ABNORMAL /HPF
BASOPHILS # BLD: 0.1 K/UL (ref 0–0.1)
BASOPHILS NFR BLD: 1 % (ref 0–1)
BILIRUB UR QL: NEGATIVE
BUN SERPL-MCNC: 20 MG/DL (ref 6–20)
BUN/CREAT SERPL: 30 (ref 12–20)
CALCIUM SERPL-MCNC: 8.1 MG/DL (ref 8.5–10.1)
CHLORIDE SERPL-SCNC: 101 MMOL/L (ref 97–108)
CO2 SERPL-SCNC: 23 MMOL/L (ref 21–32)
COLOR UR: ABNORMAL
CREAT SERPL-MCNC: 0.67 MG/DL (ref 0.7–1.3)
DATE LAST DOSE: ABNORMAL
DIFFERENTIAL METHOD BLD: ABNORMAL
EOSINOPHIL # BLD: 0.1 K/UL (ref 0–0.4)
EOSINOPHIL NFR BLD: 1 % (ref 0–7)
EPITH CASTS URNS QL MICRO: ABNORMAL /LPF
ERYTHROCYTE [DISTWIDTH] IN BLOOD BY AUTOMATED COUNT: 15 % (ref 11.5–14.5)
GLUCOSE SERPL-MCNC: 88 MG/DL (ref 65–100)
GLUCOSE UR STRIP.AUTO-MCNC: NEGATIVE MG/DL
HCT VFR BLD AUTO: 24.7 % (ref 36.6–50.3)
HGB BLD-MCNC: 8.2 G/DL (ref 12.1–17)
HGB UR QL STRIP: NEGATIVE
IMM GRANULOCYTES # BLD AUTO: 0 K/UL (ref 0–0.04)
IMM GRANULOCYTES NFR BLD AUTO: 0 % (ref 0–0.5)
KETONES UR QL STRIP.AUTO: NEGATIVE MG/DL
LEUKOCYTE ESTERASE UR QL STRIP.AUTO: NEGATIVE
LYMPHOCYTES # BLD: 1.4 K/UL (ref 0.8–3.5)
LYMPHOCYTES NFR BLD: 11 % (ref 12–49)
MCH RBC QN AUTO: 30.7 PG (ref 26–34)
MCHC RBC AUTO-ENTMCNC: 33.2 G/DL (ref 30–36.5)
MCV RBC AUTO: 92.5 FL (ref 80–99)
METAMYELOCYTES NFR BLD MANUAL: 1 %
MONOCYTES # BLD: 0.6 K/UL (ref 0–1)
MONOCYTES NFR BLD: 5 % (ref 5–13)
NEUTS BAND NFR BLD MANUAL: 2 %
NEUTS SEG # BLD: 10.3 K/UL (ref 1.8–8)
NEUTS SEG NFR BLD: 79 % (ref 32–75)
NITRITE UR QL STRIP.AUTO: NEGATIVE
NRBC # BLD: 0 K/UL (ref 0–0.01)
NRBC BLD-RTO: 0 PER 100 WBC
PH UR STRIP: 6.5 [PH] (ref 5–8)
PLATELET # BLD AUTO: 194 K/UL (ref 150–400)
PMV BLD AUTO: 9.4 FL (ref 8.9–12.9)
POTASSIUM SERPL-SCNC: 3.7 MMOL/L (ref 3.5–5.1)
PROCALCITONIN SERPL-MCNC: 0.43 NG/ML
PROT UR STRIP-MCNC: ABNORMAL MG/DL
RBC # BLD AUTO: 2.67 M/UL (ref 4.1–5.7)
RBC #/AREA URNS HPF: ABNORMAL /HPF (ref 0–5)
RBC MORPH BLD: ABNORMAL
REPORTED DOSE,DOSE: ABNORMAL UNITS
REPORTED DOSE/TIME,TMG: ABNORMAL
SODIUM SERPL-SCNC: 131 MMOL/L (ref 136–145)
SP GR UR REFRACTOMETRY: 1.02
UA: UC IF INDICATED,UAUC: ABNORMAL
UROBILINOGEN UR QL STRIP.AUTO: 1 EU/DL (ref 0.2–1)
VANCOMYCIN TROUGH SERPL-MCNC: 15.9 UG/ML (ref 5–10)
WBC # BLD AUTO: 12.7 K/UL (ref 4.1–11.1)
WBC URNS QL MICRO: ABNORMAL /HPF (ref 0–4)

## 2022-08-24 PROCEDURE — 74011000258 HC RX REV CODE- 258: Performed by: INTERNAL MEDICINE

## 2022-08-24 PROCEDURE — 65270000029 HC RM PRIVATE

## 2022-08-24 PROCEDURE — 80048 BASIC METABOLIC PNL TOTAL CA: CPT

## 2022-08-24 PROCEDURE — 74011250637 HC RX REV CODE- 250/637: Performed by: INTERNAL MEDICINE

## 2022-08-24 PROCEDURE — 85025 COMPLETE CBC W/AUTO DIFF WBC: CPT

## 2022-08-24 PROCEDURE — 81001 URINALYSIS AUTO W/SCOPE: CPT

## 2022-08-24 PROCEDURE — 74011250636 HC RX REV CODE- 250/636: Performed by: INTERNAL MEDICINE

## 2022-08-24 PROCEDURE — 84145 PROCALCITONIN (PCT): CPT

## 2022-08-24 PROCEDURE — 87040 BLOOD CULTURE FOR BACTERIA: CPT

## 2022-08-24 PROCEDURE — 74011250636 HC RX REV CODE- 250/636: Performed by: STUDENT IN AN ORGANIZED HEALTH CARE EDUCATION/TRAINING PROGRAM

## 2022-08-24 PROCEDURE — 36415 COLL VENOUS BLD VENIPUNCTURE: CPT

## 2022-08-24 PROCEDURE — 74011000250 HC RX REV CODE- 250: Performed by: INTERNAL MEDICINE

## 2022-08-24 PROCEDURE — 80202 ASSAY OF VANCOMYCIN: CPT

## 2022-08-24 RX ADMIN — ENOXAPARIN SODIUM 40 MG: 100 INJECTION SUBCUTANEOUS at 09:14

## 2022-08-24 RX ADMIN — GABAPENTIN 100 MG: 100 CAPSULE ORAL at 21:44

## 2022-08-24 RX ADMIN — CETIRIZINE HYDROCHLORIDE 10 MG: 10 TABLET, FILM COATED ORAL at 09:14

## 2022-08-24 RX ADMIN — GABAPENTIN 100 MG: 100 CAPSULE ORAL at 09:14

## 2022-08-24 RX ADMIN — VANCOMYCIN HYDROCHLORIDE 1000 MG: 1 INJECTION, POWDER, LYOPHILIZED, FOR SOLUTION INTRAVENOUS at 16:45

## 2022-08-24 RX ADMIN — GABAPENTIN 100 MG: 100 CAPSULE ORAL at 16:45

## 2022-08-24 RX ADMIN — OXYCODONE 2.5 MG: 5 TABLET ORAL at 21:44

## 2022-08-24 RX ADMIN — OXYCODONE 2.5 MG: 5 TABLET ORAL at 12:20

## 2022-08-24 RX ADMIN — ATORVASTATIN CALCIUM 10 MG: 10 TABLET, FILM COATED ORAL at 09:13

## 2022-08-24 RX ADMIN — SODIUM CHLORIDE, PRESERVATIVE FREE 10 ML: 5 INJECTION INTRAVENOUS at 21:48

## 2022-08-24 RX ADMIN — VANCOMYCIN HYDROCHLORIDE 1250 MG: 10 INJECTION, POWDER, LYOPHILIZED, FOR SOLUTION INTRAVENOUS at 07:31

## 2022-08-24 RX ADMIN — SODIUM CHLORIDE 1 G: 900 INJECTION INTRAVENOUS at 10:08

## 2022-08-24 RX ADMIN — AMLODIPINE BESYLATE 10 MG: 5 TABLET ORAL at 09:13

## 2022-08-24 RX ADMIN — SODIUM CHLORIDE, PRESERVATIVE FREE 10 ML: 5 INJECTION INTRAVENOUS at 13:29

## 2022-08-24 RX ADMIN — SODIUM CHLORIDE, PRESERVATIVE FREE 10 ML: 5 INJECTION INTRAVENOUS at 05:08

## 2022-08-24 RX ADMIN — TRAZODONE HYDROCHLORIDE 50 MG: 50 TABLET ORAL at 21:44

## 2022-08-24 NOTE — PROGRESS NOTES
End of Shift Note    Bedside shift change report given to OLEGARIO Waite(oncoming nurse) by Almaz Shepherd RN (offgoing nurse). Report included the following information SBAR, Kardex, MAR, and Recent Results    Shift worked: 8419-9626     Shift summary and any significant changes:     Pt. C/o of constipation. 2BMs after dosed bowel regimen. UA sent.      Concerns for physician to address:       Zone phone for oncoming shift:              Length of Stay:  Expected LOS: 3d 4h  Actual LOS: 2      Almaz Shepherd, RN

## 2022-08-24 NOTE — PROGRESS NOTES
Problem: Patient Education: Go to Patient Education Activity  Goal: Patient/Family Education  Outcome: Progressing Towards Goal     Problem: Patient Education: Go to Patient Education Activity  Goal: Patient/Family Education  Outcome: Progressing Towards Goal     Problem: Falls - Risk of  Goal: *Absence of Falls  Description: Document Sheri Watson Fall Risk and appropriate interventions in the flowsheet.   Outcome: Progressing Towards Goal  Note: Fall Risk Interventions:  Mobility Interventions: Communicate number of staff needed for ambulation/transfer         Medication Interventions: Patient to call before getting OOB, Teach patient to arise slowly    Elimination Interventions: Call light in reach              Problem: Patient Education: Go to Patient Education Activity  Goal: Patient/Family Education  Outcome: Progressing Towards Goal

## 2022-08-24 NOTE — PROGRESS NOTES
End of Shift Note    Bedside shift change report given to Leah Chatterjee (oncoming nurse) by Koby Watson LPN (offgoing nurse). Report included the following information SBAR, Kardex, and MAR    Shift worked:  7a-7p     Shift summary and any significant changes:    Patient rested well this shift. Repeat blood cultures drawn. Medicated patient for pain x1. Patient OOB ambulating in hallways x2. Concerns for physician to address: PT/OT consult needed     Zone phone for oncoming shift:   [phone]       Activity:  Activity Level: Up with Assistance  Number times ambulated in hallways past shift: 2  Number of times OOB to chair past shift: 2    Cardiac:   Cardiac Monitoring: No           Access:  Current line(s): PIV     Genitourinary:   Urinary status: voiding and incontinent    Respiratory:   O2 Device: None (Room air)  Chronic home O2 use?: NO  Incentive spirometer at bedside: YES       GI:  Last Bowel Movement Date: 08/24/22  Current diet:  ADULT DIET Regular; 1200 ml  Passing flatus: YES  Tolerating current diet: YES       Pain Management:   Patient states pain is manageable on current regimen: YES    Skin:  Chavez Score: 17  Interventions: float heels, increase time out of bed, PT/OT consult, and nutritional support     Patient Safety:  Fall Score:  Total Score: 3  Interventions: gripper socks  High Fall Risk: Yes    Length of Stay:  Expected LOS: 3d 4h  Actual LOS: 2      Koby Watson LPN

## 2022-08-24 NOTE — PROGRESS NOTES
Reason for Admission:   Rib Fracture                    RUR Score: 17%                 PCP: First and Last name:   Gabriel Mohr MD     Name of Practice:    Are you a current patient: Yes/No:    Approximate date of last visit: 1 month ago   Can you participate in a virtual visit if needed:     Do you (patient/family) have any concerns for transition/discharge? Patient wants to return home and not go to SNF for therapy. Patient lives a lone and is currently not about to perform ADL/IADLs independently. Patient's family lives in [facility]. Plan for utilizing home health:   Possibly agreeable    Current Advanced Directive/Advance Care Plan:  Full Code  CM confirmed with patient that he is a Full Code    Healthcare Decision Maker:   Click here to complete [address] including selection of the Healthcare Decision Maker Relationship (ie \"Primary\")            Tan Holland, [phone]    Transition of Care Plan:        CM spoke with patient over the phone. Patient was at [facility] before coming to the hospital.  Patient states that he will not return to [facility] and will not consider a different SNF. He wants to return home. At home patient lives alone and does not have any assistance. Patient a no DME. Patient states that his sister from [place] will be his ride home and to follow up appointments. Patient uses the [facility] in [place], [place].  Current Dipso: Home/self vs HH

## 2022-08-24 NOTE — PROGRESS NOTES
Problem: Falls - Risk of  Goal: *Absence of Falls  Description: Document Isabel Blood Fall Risk and appropriate interventions in the flowsheet.   Outcome: Progressing Towards Goal  Note: Fall Risk Interventions:

## 2022-08-24 NOTE — PROGRESS NOTES
Pharmacy Automatic Renal Dosing Protocol - Antimicrobials    Indication for Antimicrobials: SSTI (Left basilic vein thrombosis with phlebitis vs cellulitis)    Current Regimen of Each Antimicrobial:  Vancomycin 1500 mg x1 then 1250 mg Q12H  (Start Date 22; Day 3)  Ceftriaxone 1g q 24h (start: -)    Previous Antimicrobial Therapy:    Vancomycin Goal AUC: 400-600 mg*hour/liter per day   Date Dose & Interval Measured (mcg/mL) Steady State   AUC (mcg/mL)    1250 mg IV q12h 15.9  606                 Date & time of next level: Vanc trough in 2-3 days    Significant Cultures:    blood: probable Staph Aureus in 2 of 4. Pending   blood: pending    Radiology / Imaging results: (X-ray, CT scan or MRI):       Conditions for Dosing Consideration: None    Labs:  Recent Labs     22  0537 22  1616   CREA 0.67* 0.71   BUN 20 21*   PCT 0.43  --      Recent Labs     22  0537 22  1616   WBC 12.7* 13.4*   BANDS 2 1     Temp (24hrs), Av.2 °F (36.8 °C), Min:97.7 °F (36.5 °C), Max:98.6 °F (37 °C)        Creatinine Clearance (mL/min):   CrCl (Ideal Body Weight): 94.1   If actual weight < IBW: CrCl (Actual Body Weight) 96.4    Impression/Plan:   Vancomycin level is therapeutic at the ceiling of targeted range. Adjust Vancomycin to 1000 mg IV q12h  Continue ceftriaxone  BMP daily  Antimicrobial stop date: Ceftriaxone 5 days, Vancomycin 7 days. Pharmacy will follow daily and adjust medications as appropriate for renal function and/or serum levels.     Thank you,  Nikko Tinoco, PHARMD

## 2022-08-24 NOTE — PROGRESS NOTES
Hospitalist Progress Note    NAME: Radha Rojas. :  1945   MRN:  [id number]       Assessment / Plan:  Musculoskeletal chest pain, POA  Recent mechanical fall, POA  Acute fracture of the left seventh seventh costochondral junction, POA  - Presented with musculoskeletal chest pain in nature  - CTA with acute fracture of the left seventh rib costochondral junction (he fell on  and was admitted to [facility], discharged to rehab on )  - EKG showed SR with PACs  - check orthostatic vitals. 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 Wbc 13K on admission  -check UA, procal  - Duplex ultrasound preliminary results with thrombus within the left basilic upper arm veins. No deep vein thrombosis reported  WBC is trending down blood blood culture grew staph repeat blood culture sent, continue with IV antibiotics ceftriaxone and Vanco  - Supportive care with left arm elevation and warm/cold compression, prn ketorolac    Coronary artery disease  Hypertension  -cont' amlodipine, incr dose. Lower coreg (low HR at [facility]). Adjust as needed. Will need to simplify his meds at discharge  -pharmacy to help with med recs    Hyperlipidemia  - Continue home statins     Wounds on L/R arms from fall  Wound care     Constipation  No bm for 5 days, requesting suppository  Will add lactulose prn    Chronic atopic dermatitis   Follows with derm at [facility]    BARBARA:  pending clinical improvement    Updated pt's 2 sisters at bedside       Code Status: Full code  Surrogate Decision Maker: pt's sisters  DVT Prophylaxis: Lovenox  GI Prophylaxis: not indicated     Baseline:  Active, lives alone, came from rehab facility Subjective:     Chief Complaint / Reason for Physician Visit  Patient seen today discussed with him about his carvedilol with his sister at the bedside no new symptoms reported   discussed with RN events overnight. Review of Systems:  Symptom Y/N Comments  Symptom Y/N Comments   Fever/Chills    Chest Pain     Poor Appetite    Edema     Cough    Abdominal Pain     Sputum    Joint Pain     SOB/BROTHERS    Pruritis/Rash     Nausea/vomit    Tolerating PT/OT     Diarrhea    Tolerating Diet     Constipation    Other       Could NOT obtain due to:      Objective:     VITALS:   Last 24hrs VS reviewed since prior progress note. Most recent are:  Patient Vitals for the past 24 hrs:   Temp Pulse Resp BP SpO2   08/24/22 0815 98.1 °F (36.7 °C) 76 16 (!) 136/57 100 %   08/24/22 0543 98.3 °F (36.8 °C) 77 14 137/61 100 %   08/23/22 2003 97.7 °F (36.5 °C) 65 14 123/75 99 %   08/23/22 1918 98.6 °F (37 °C) 64 14 (!) 128/45 100 %         Intake/Output Summary (Last 24 hours) at 8/24/2022 1753  Last data filed at 8/24/2022 1645  Gross per 24 hour   Intake 800 ml   Output 400 ml   Net 400 ml          I had a face to face encounter and independently examined this patient on 8/24/2022, as outlined below:  PHYSICAL EXAM:  General: WD, WN. Alert, cooperative, no acute distress    EENT:  EOMI. Anicteric sclerae. MMM  Resp:  CTA bilaterally, no wheezing or rales. No accessory muscle use  CV:  Regular  rhythm,  L arm edema  GI:  Soft, Non distended, Non tender. +Bowel sounds  Neurologic:  Alert and oriented X 3, normal speech  Psych:   Good insight. Not anxious nor agitated  Skin:  Skin tears and wound son b/l arms. Erythema and swelling on L arm.     Reviewed most current lab test results and cultures  YES  Reviewed most current radiology test results   YES  Review and summation of old records today    NO  Reviewed patient's current orders and MAR    YES  PMH/SH reviewed - no change compared to H&P  ________________________________________________________________________  Care Plan discussed with:    Comments   Patient x    Family  x    RN x    Care Manager     Consultant                        Multidiciplinary team rounds were held today with , nursing, pharmacist and clinical coordinator. Patient's plan of care was discussed; medications were reviewed and discharge planning was addressed. ________________________________________________________________________  Total NON critical care TIME:  35 Minutes    Total CRITICAL CARE TIME Spent:   Minutes non procedure based      Comments   >50% of visit spent in counseling and coordination of care     ________________________________________________________________________  Melissa Bender MD     Procedures: see electronic medical records for all procedures/Xrays and details which were not copied into this note but were reviewed prior to creation of Plan. LABS:  I reviewed today's most current labs and imaging studies. Pertinent labs include:  Recent Labs     08/24/22  0537 08/22/22  1616   WBC 12.7* 13.4*   HGB 8.2* 8.9*   HCT 24.7* 25.7*    186       Recent Labs     08/24/22  0537 08/22/22  1616   * 129*   K 3.7 3.9    98   CO2 23 22   GLU 88 103*   BUN 20 21*   CREA 0.67* 0.71   CA 8.1* 8.1*   ALB  --  2.3*   TBILI  --  0.7   ALT  --  29         Signed:  Melissa Bender MD

## 2022-08-25 ENCOUNTER — APPOINTMENT (OUTPATIENT)
Dept: NON INVASIVE DIAGNOSTICS | Age: 77
DRG: 603 | End: 2022-08-25
Attending: STUDENT IN AN ORGANIZED HEALTH CARE EDUCATION/TRAINING PROGRAM
Payer: MEDICARE

## 2022-08-25 LAB
ANION GAP SERPL CALC-SCNC: 5 MMOL/L (ref 5–15)
BUN SERPL-MCNC: 18 MG/DL (ref 6–20)
BUN/CREAT SERPL: 26 (ref 12–20)
CALCIUM SERPL-MCNC: 8.1 MG/DL (ref 8.5–10.1)
CHLORIDE SERPL-SCNC: 101 MMOL/L (ref 97–108)
CO2 SERPL-SCNC: 25 MMOL/L (ref 21–32)
CREAT SERPL-MCNC: 0.69 MG/DL (ref 0.7–1.3)
ECHO AV AREA PEAK VELOCITY: 1.2 CM2
ECHO AV AREA VTI: 1 CM2
ECHO AV AREA/BSA PEAK VELOCITY: 0.6 CM2/M2
ECHO AV AREA/BSA VTI: 0.5 CM2/M2
ECHO AV MEAN GRADIENT: 9 MMHG
ECHO AV MEAN VELOCITY: 1.4 M/S
ECHO AV PEAK GRADIENT: 14 MMHG
ECHO AV PEAK VELOCITY: 1.9 M/S
ECHO AV VELOCITY RATIO: 0.47
ECHO AV VTI: 44.7 CM
ECHO EST RA PRESSURE: 10 MMHG
ECHO LA DIAMETER INDEX: 1.68 CM/M2
ECHO LA DIAMETER: 3.3 CM
ECHO LV FRACTIONAL SHORTENING: 24 % (ref 28–44)
ECHO LV INTERNAL DIMENSION DIASTOLE INDEX: 2.59 CM/M2
ECHO LV INTERNAL DIMENSION DIASTOLIC: 5.1 CM (ref 4.2–5.9)
ECHO LV INTERNAL DIMENSION SYSTOLIC INDEX: 1.98 CM/M2
ECHO LV INTERNAL DIMENSION SYSTOLIC: 3.9 CM
ECHO LV IVSD: 0.8 CM (ref 0.6–1)
ECHO LV MASS 2D: 140.5 G (ref 88–224)
ECHO LV MASS INDEX 2D: 71.3 G/M2 (ref 49–115)
ECHO LV POSTERIOR WALL DIASTOLIC: 0.8 CM (ref 0.6–1)
ECHO LV RELATIVE WALL THICKNESS RATIO: 0.31
ECHO LVOT AREA: 2.5 CM2
ECHO LVOT AV VTI INDEX: 0.39
ECHO LVOT DIAM: 1.8 CM
ECHO LVOT MEAN GRADIENT: 2 MMHG
ECHO LVOT PEAK GRADIENT: 3 MMHG
ECHO LVOT PEAK VELOCITY: 0.9 M/S
ECHO LVOT STROKE VOLUME INDEX: 22.5 ML/M2
ECHO LVOT SV: 44.3 ML
ECHO LVOT VTI: 17.4 CM
ECHO MV A VELOCITY: 1.06 M/S
ECHO MV AREA PHT: 6.9 CM2
ECHO MV AREA VTI: 2.1 CM2
ECHO MV E DECELERATION TIME (DT): 125.8 MS
ECHO MV E VELOCITY: 0.88 M/S
ECHO MV E/A RATIO: 0.83
ECHO MV LVOT VTI INDEX: 1.24
ECHO MV MAX VELOCITY: 1 M/S
ECHO MV MEAN GRADIENT: 2 MMHG
ECHO MV MEAN VELOCITY: 0.6 M/S
ECHO MV PEAK GRADIENT: 4 MMHG
ECHO MV PRESSURE HALF TIME (PHT): 32.1 MS
ECHO MV VTI: 21.5 CM
ECHO PV MAX VELOCITY: 1.5 M/S
ECHO PV PEAK GRADIENT: 9 MMHG
ECHO RIGHT VENTRICULAR SYSTOLIC PRESSURE (RVSP): 33 MMHG
ECHO RV FREE WALL PEAK S': 21 CM/S
ECHO TV REGURGITANT MAX VELOCITY: 2.39 M/S
ECHO TV REGURGITANT PEAK GRADIENT: 23 MMHG
GLUCOSE SERPL-MCNC: 108 MG/DL (ref 65–100)
POTASSIUM SERPL-SCNC: 3.7 MMOL/L (ref 3.5–5.1)
SODIUM SERPL-SCNC: 131 MMOL/L (ref 136–145)

## 2022-08-25 PROCEDURE — 74011250637 HC RX REV CODE- 250/637: Performed by: INTERNAL MEDICINE

## 2022-08-25 PROCEDURE — 74011000250 HC RX REV CODE- 250: Performed by: INTERNAL MEDICINE

## 2022-08-25 PROCEDURE — 97530 THERAPEUTIC ACTIVITIES: CPT

## 2022-08-25 PROCEDURE — 97116 GAIT TRAINING THERAPY: CPT

## 2022-08-25 PROCEDURE — 74011250636 HC RX REV CODE- 250/636: Performed by: STUDENT IN AN ORGANIZED HEALTH CARE EDUCATION/TRAINING PROGRAM

## 2022-08-25 PROCEDURE — 65270000029 HC RM PRIVATE

## 2022-08-25 PROCEDURE — C8929 TTE W OR WO FOL WCON,DOPPLER: HCPCS

## 2022-08-25 PROCEDURE — 97535 SELF CARE MNGMENT TRAINING: CPT

## 2022-08-25 PROCEDURE — 74011000258 HC RX REV CODE- 258: Performed by: INTERNAL MEDICINE

## 2022-08-25 PROCEDURE — 74011250636 HC RX REV CODE- 250/636: Performed by: INTERNAL MEDICINE

## 2022-08-25 PROCEDURE — 93306 TTE W/DOPPLER COMPLETE: CPT | Performed by: INTERNAL MEDICINE

## 2022-08-25 PROCEDURE — 36415 COLL VENOUS BLD VENIPUNCTURE: CPT

## 2022-08-25 PROCEDURE — 80048 BASIC METABOLIC PNL TOTAL CA: CPT

## 2022-08-25 RX ADMIN — AMLODIPINE BESYLATE 10 MG: 5 TABLET ORAL at 08:03

## 2022-08-25 RX ADMIN — TRAZODONE HYDROCHLORIDE 50 MG: 50 TABLET ORAL at 21:10

## 2022-08-25 RX ADMIN — ATORVASTATIN CALCIUM 10 MG: 10 TABLET, FILM COATED ORAL at 08:03

## 2022-08-25 RX ADMIN — VANCOMYCIN HYDROCHLORIDE 1000 MG: 1 INJECTION, POWDER, LYOPHILIZED, FOR SOLUTION INTRAVENOUS at 04:14

## 2022-08-25 RX ADMIN — CETIRIZINE HYDROCHLORIDE 10 MG: 10 TABLET, FILM COATED ORAL at 08:03

## 2022-08-25 RX ADMIN — GABAPENTIN 100 MG: 100 CAPSULE ORAL at 18:14

## 2022-08-25 RX ADMIN — GABAPENTIN 100 MG: 100 CAPSULE ORAL at 08:03

## 2022-08-25 RX ADMIN — PERFLUTREN 2 ML: 6.52 INJECTION, SUSPENSION INTRAVENOUS at 11:00

## 2022-08-25 RX ADMIN — SODIUM CHLORIDE 1 G: 900 INJECTION INTRAVENOUS at 08:00

## 2022-08-25 RX ADMIN — SODIUM CHLORIDE, PRESERVATIVE FREE 10 ML: 5 INJECTION INTRAVENOUS at 04:20

## 2022-08-25 RX ADMIN — GABAPENTIN 100 MG: 100 CAPSULE ORAL at 21:10

## 2022-08-25 RX ADMIN — SODIUM CHLORIDE, PRESERVATIVE FREE 10 ML: 5 INJECTION INTRAVENOUS at 18:23

## 2022-08-25 RX ADMIN — SODIUM CHLORIDE, PRESERVATIVE FREE 10 ML: 5 INJECTION INTRAVENOUS at 21:26

## 2022-08-25 RX ADMIN — KETOROLAC TROMETHAMINE 15 MG: 30 INJECTION, SOLUTION INTRAMUSCULAR; INTRAVENOUS at 21:10

## 2022-08-25 RX ADMIN — ENOXAPARIN SODIUM 40 MG: 100 INJECTION SUBCUTANEOUS at 08:07

## 2022-08-25 RX ADMIN — CEFAZOLIN 2 G: 1 INJECTION, POWDER, FOR SOLUTION INTRAMUSCULAR; INTRAVENOUS at 18:14

## 2022-08-25 RX ADMIN — ACETAMINOPHEN 650 MG: 325 TABLET ORAL at 04:13

## 2022-08-25 NOTE — PROGRESS NOTES
Hospitalist Progress Note    NAME: Fco Taylor. :  1945   MRN:  [id number]       Assessment / Plan:  Musculoskeletal chest pain, POA  Recent mechanical fall, POA  Acute fracture of the left seventh seventh costochondral junction, POA  - Presented with musculoskeletal chest pain in nature  - CTA with acute fracture of the left seventh rib costochondral junction (he fell on  and was admitted to [facility], discharged to rehab on )  - EKG showed SR with PACs  - check orthostatic vitals. 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 Wbc 13K on admission  -check UA, procal  - Duplex ultrasound preliminary results with thrombus within the left basilic upper arm veins. No deep vein thrombosis reported  WBC is trending down blood blood culture grew staph repeat blood culture sent, continue with IV antibiotics ceftriaxone and Vanco  - Supportive care with left arm elevation and warm/cold compression, prn ketorolac    Coronary artery disease  Hypertension  -cont' amlodipine, incr dose. Lower coreg (low HR at [facility]). Adjust as needed.   Will need to simplify his meds at discharge  -pharmacy to help with med recs    Hyperlipidemia  - Continue home statins     Wounds on L/R arms from fall  Wound care     Constipation  No bm for 5 days, requesting suppository  Will add lactulose prn    Chronic atopic dermatitis   Follows with derm at [facility]    BARBARA:  pending clinical improvement    Updated pt's 2 sisters at bedside       Code Status: Full code  Surrogate Decision Maker: pt's sisters  DVT Prophylaxis: Lovenox  GI Prophylaxis: not indicated     Baseline: Active, lives alone, came from rehab facility     Subjective:     Chief Complaint / Reason for Physician Visit  Patient seen today discussed with him about his carvedilol with his sister at the bedside no new symptoms reported   discussed with RN events overnight. Review of Systems:  Symptom Y/N Comments  Symptom Y/N Comments   Fever/Chills    Chest Pain     Poor Appetite    Edema     Cough    Abdominal Pain     Sputum    Joint Pain     SOB/BROTHERS    Pruritis/Rash     Nausea/vomit    Tolerating PT/OT     Diarrhea    Tolerating Diet     Constipation    Other       Could NOT obtain due to:      Objective:     VITALS:   Last 24hrs VS reviewed since prior progress note. Most recent are:  Patient Vitals for the past 24 hrs:   Temp Pulse Resp BP SpO2   08/25/22 0955 -- -- -- (!) 141/66 --   08/25/22 0747 97.7 °F (36.5 °C) 75 16 (!) 141/66 96 %   08/25/22 0407 97.5 °F (36.4 °C) 81 18 (!) 149/62 97 %   08/24/22 2148 98 °F (36.7 °C) 83 17 (!) 139/59 96 %         Intake/Output Summary (Last 24 hours) at 8/25/2022 1342  Last data filed at 8/25/2022 0750  Gross per 24 hour   Intake --   Output 500 ml   Net -500 ml          I had a face to face encounter and independently examined this patient on 8/25/2022, as outlined below:  PHYSICAL EXAM:  General: WD, WN. Alert, cooperative, no acute distress    EENT:  EOMI. Anicteric sclerae. MMM  Resp:  CTA bilaterally, no wheezing or rales. No accessory muscle use  CV:  Regular  rhythm,  L arm edema  GI:  Soft, Non distended, Non tender. +Bowel sounds  Neurologic:  Alert and oriented X 3, normal speech  Psych:   Good insight. Not anxious nor agitated  Skin:  Skin tears and wound son b/l arms. Erythema and swelling on L arm.     Reviewed most current lab test results and cultures  YES  Reviewed most current radiology test results   YES  Review and summation of old records today    NO  Reviewed patient's current orders and MAR    YES  PMH/SH reviewed - no change compared to H&P  ________________________________________________________________________  Care Plan discussed with:    Comments   Patient x    Family  x    RN x    Care Manager     Consultant                        Multidiciplinary team rounds were held today with , nursing, pharmacist and clinical coordinator. Patient's plan of care was discussed; medications were reviewed and discharge planning was addressed. ________________________________________________________________________  Total NON critical care TIME:  35 Minutes    Total CRITICAL CARE TIME Spent:   Minutes non procedure based      Comments   >50% of visit spent in counseling and coordination of care     ________________________________________________________________________  Frieda Alvarez MD     Procedures: see electronic medical records for all procedures/Xrays and details which were not copied into this note but were reviewed prior to creation of Plan. LABS:  I reviewed today's most current labs and imaging studies. Pertinent labs include:  Recent Labs     08/24/22  0537 08/22/22  1616   WBC 12.7* 13.4*   HGB 8.2* 8.9*   HCT 24.7* 25.7*    186       Recent Labs     08/25/22  0020 08/24/22  0537 08/22/22  1616   * 131* 129*   K 3.7 3.7 3.9    101 98   CO2 25 23 22   * 88 103*   BUN 18 20 21*   CREA 0.69* 0.67* 0.71   CA 8.1* 8.1* 8.1*   ALB  --   --  2.3*   TBILI  --   --  0.7   ALT  --   --  29         Signed:  Frieda Alvarez MD

## 2022-08-25 NOTE — PROGRESS NOTES
End of Shift Note    Bedside shift change report given to [facility] RN (oncoming nurse) by Aretha Patino RN (offgoing nurse). Report included the following information SBAR, Kardex, Intake/Output, MAR, Recent Results, and Quality Measures    Shift worked:  7pm-7am     Shift summary and any significant changes:     Pain Management     Concerns for physician to address:  Patient concerned for spread or cellulitis to right arm.       Zone phone for oncoming shift:   [phone]         Aretha Patino, RN

## 2022-08-25 NOTE — CONSULTS
ID  Consult received for MSSA bacteremia  Changed to cefazolin IV  Await echo report. Will see pt tomorrow.

## 2022-08-25 NOTE — PROGRESS NOTES
Problem: Mobility Impaired (Adult and Pediatric)  Goal: *Acute Goals and Plan of Care (Insert Text)  Description: FUNCTIONAL STATUS PRIOR TO ADMISSION: Pt comes from [facility] where he was receiving therapy post stay at [facility]. He had been living at home alone prior and states he did not use a device in the house but relied on furniture surfing. He states he had been driving but did not go out much    [facility]: The patient lived alone with no local support. Physical Therapy Goals  Initiated 8/23/2022  1. Patient will move from supine to sit and sit to supine , scoot up and down, and roll side to side in bed with modified independence within 7 day(s). 2.  Patient will transfer from bed to chair and chair to bed with modified independence using the least restrictive device within 7 day(s). 3.  Patient will perform sit to stand with modified independence within 7 day(s). 4.  Patient will ambulate with modified independence for 150 feet with the least restrictive device within 7 day(s). 5.  Patient will ascend/descend 2 stairs with handrail(s) with minimal assistance/contact guard assist within 7 day(s). Outcome: Progressing Towards Goal   PHYSICAL THERAPY TREATMENT  Patient: Ja Goode (78 y.o. male)  Date: 8/25/2022  Diagnosis: Rib fracture [S22.39XA] <principal problem not specified>      Precautions: Fall  Chart, physical therapy assessment, plan of care and goals were reviewed. ASSESSMENT  Patient continues with skilled PT services and is progressing towards goals. Pt presents with decreased strength and endurance. Pts BP taken with position changes. BP stable with mobility. Pt performed bed mobility at supervision. Pt performed sit to stand transfer at [facility]. Pt ambulated 130ft with RW at [facility]. Pt with good stability and no LOB. Will continue to increase mobility tolerance and safety.       Current Level of Function Impacting Discharge (mobility/balance): bed mobility at supervision, sit to stand transfer at [facility], ambulation at [facility] with RW      Other factors to consider for discharge: pain          PLAN :  Patient continues to benefit from skilled intervention to address the above impairments. Continue treatment per established plan of care. to address goals.     Recommendation for discharge: (in order for the patient to meet his/her long term goals)  Physical therapy at least 2 days/week in the home AND ensure assist and/or supervision for safety with mobility     This discharge recommendation:  Has been made in collaboration with the attending provider and/or case management    IF patient discharges home will need the following DME: shower chair and rolling walker       SUBJECTIVE:   Patient stated .    OBJECTIVE DATA SUMMARY:   Critical Behavior:  Neurologic State: Alert  Orientation Level: Oriented X4  Cognition: Follows commands  Safety/Judgement: Fall prevention, Decreased awareness of need for safety  Functional Mobility Training:  Bed Mobility:  Rolling: Supervision  Supine to Sit: Supervision     Scooting: Supervision        Transfers:  Sit to Stand: Contact guard assistance  Stand to Sit: Contact guard assistance        Bed to Chair: Contact guard assistance                    Balance:  Sitting: Intact  Standing: Impaired  Standing - Static: Good  Standing - Dynamic : Good  Ambulation/Gait Training:  Distance (ft): 130 Feet (ft)  Assistive Device: Gait belt;Walker, rolling  Ambulation - Level of Assistance: Contact guard assistance        Gait Abnormalities: Decreased step clearance        Base of Support: Widened     Speed/Keila: Pace decreased (<100 feet/min)  Step Length: Right shortened;Left shortened     Pain Rating:  Pt reported pain of left rib    Activity Tolerance:   WNL and Good    After treatment patient left in no apparent distress:   Sitting in chair and Call bell within reach    COMMUNICATION/COLLABORATION:   The patients plan of care was discussed with: Occupational therapist and Registered nurse.      Heather Tejeda, AMPARO   Time Calculation: 23 mins

## 2022-08-25 NOTE — PROGRESS NOTES
End of Shift Note    Bedside shift change report given to Jo Ann (oncoming nurse) by Andreas Jamison RN (offgoing nurse).   Report included the following information SBAR, Kardex, Intake/Output, MAR, and Recent Results    Shift worked:  7a-7p     Shift summary and any significant changes:     ID consult, ECHO, wound care orders every 3 days, ambulated halls today with sister, up in chair with PT, no pain meds needed this shift     Concerns for physician to address:  none     Zone phone for oncoming shift:   [phone]

## 2022-08-25 NOTE — WOUND CARE
Wound care consult for a right arm skin tear present on admission. Chart reviewed and patient assessed while sitting in the chair today. The right arm skin tear needs a dressing but is superficial.   The left elbow wound is a dry scab. No dressing needed.    Cnydi Acevedo RN, BSN, [employer]

## 2022-08-26 LAB
ANION GAP SERPL CALC-SCNC: 7 MMOL/L (ref 5–15)
BUN SERPL-MCNC: 17 MG/DL (ref 6–20)
BUN/CREAT SERPL: 23 (ref 12–20)
CALCIUM SERPL-MCNC: 8 MG/DL (ref 8.5–10.1)
CHLORIDE SERPL-SCNC: 103 MMOL/L (ref 97–108)
CO2 SERPL-SCNC: 22 MMOL/L (ref 21–32)
CREAT SERPL-MCNC: 0.74 MG/DL (ref 0.7–1.3)
ERYTHROCYTE [DISTWIDTH] IN BLOOD BY AUTOMATED COUNT: 15 % (ref 11.5–14.5)
GLUCOSE SERPL-MCNC: 99 MG/DL (ref 65–100)
HCT VFR BLD AUTO: 23.4 % (ref 36.6–50.3)
HGB BLD-MCNC: 7.6 G/DL (ref 12.1–17)
MCH RBC QN AUTO: 30.4 PG (ref 26–34)
MCHC RBC AUTO-ENTMCNC: 32.5 G/DL (ref 30–36.5)
MCV RBC AUTO: 93.6 FL (ref 80–99)
NRBC # BLD: 0 K/UL (ref 0–0.01)
NRBC BLD-RTO: 0 PER 100 WBC
PLATELET # BLD AUTO: 207 K/UL (ref 150–400)
PMV BLD AUTO: 9.3 FL (ref 8.9–12.9)
POTASSIUM SERPL-SCNC: 3.6 MMOL/L (ref 3.5–5.1)
RBC # BLD AUTO: 2.5 M/UL (ref 4.1–5.7)
SODIUM SERPL-SCNC: 132 MMOL/L (ref 136–145)
WBC # BLD AUTO: 10.5 K/UL (ref 4.1–11.1)

## 2022-08-26 PROCEDURE — 97535 SELF CARE MNGMENT TRAINING: CPT

## 2022-08-26 PROCEDURE — 74011250636 HC RX REV CODE- 250/636: Performed by: INTERNAL MEDICINE

## 2022-08-26 PROCEDURE — 97116 GAIT TRAINING THERAPY: CPT

## 2022-08-26 PROCEDURE — 74011000250 HC RX REV CODE- 250: Performed by: INTERNAL MEDICINE

## 2022-08-26 PROCEDURE — 99223 1ST HOSP IP/OBS HIGH 75: CPT | Performed by: INTERNAL MEDICINE

## 2022-08-26 PROCEDURE — 36415 COLL VENOUS BLD VENIPUNCTURE: CPT

## 2022-08-26 PROCEDURE — 74011250637 HC RX REV CODE- 250/637: Performed by: INTERNAL MEDICINE

## 2022-08-26 PROCEDURE — 85027 COMPLETE CBC AUTOMATED: CPT

## 2022-08-26 PROCEDURE — 65270000029 HC RM PRIVATE

## 2022-08-26 PROCEDURE — 80048 BASIC METABOLIC PNL TOTAL CA: CPT

## 2022-08-26 RX ADMIN — SODIUM CHLORIDE, PRESERVATIVE FREE 10 ML: 5 INJECTION INTRAVENOUS at 05:08

## 2022-08-26 RX ADMIN — ACETAMINOPHEN 650 MG: 325 TABLET ORAL at 21:37

## 2022-08-26 RX ADMIN — CEFAZOLIN 2 G: 1 INJECTION, POWDER, FOR SOLUTION INTRAMUSCULAR; INTRAVENOUS at 18:42

## 2022-08-26 RX ADMIN — AMLODIPINE BESYLATE 10 MG: 5 TABLET ORAL at 08:26

## 2022-08-26 RX ADMIN — CEFAZOLIN 2 G: 1 INJECTION, POWDER, FOR SOLUTION INTRAMUSCULAR; INTRAVENOUS at 02:31

## 2022-08-26 RX ADMIN — GABAPENTIN 100 MG: 100 CAPSULE ORAL at 21:37

## 2022-08-26 RX ADMIN — ENOXAPARIN SODIUM 40 MG: 100 INJECTION SUBCUTANEOUS at 08:21

## 2022-08-26 RX ADMIN — TRAZODONE HYDROCHLORIDE 50 MG: 50 TABLET ORAL at 21:37

## 2022-08-26 RX ADMIN — ACETAMINOPHEN 650 MG: 325 TABLET ORAL at 13:22

## 2022-08-26 RX ADMIN — POLYETHYLENE GLYCOL 3350 17 G: 17 POWDER, FOR SOLUTION ORAL at 15:46

## 2022-08-26 RX ADMIN — CEFAZOLIN 2 G: 1 INJECTION, POWDER, FOR SOLUTION INTRAMUSCULAR; INTRAVENOUS at 09:06

## 2022-08-26 RX ADMIN — GABAPENTIN 100 MG: 100 CAPSULE ORAL at 15:46

## 2022-08-26 RX ADMIN — SODIUM CHLORIDE, PRESERVATIVE FREE 10 ML: 5 INJECTION INTRAVENOUS at 13:33

## 2022-08-26 RX ADMIN — SODIUM CHLORIDE, PRESERVATIVE FREE 10 ML: 5 INJECTION INTRAVENOUS at 21:42

## 2022-08-26 RX ADMIN — ATORVASTATIN CALCIUM 10 MG: 10 TABLET, FILM COATED ORAL at 08:26

## 2022-08-26 RX ADMIN — GABAPENTIN 100 MG: 100 CAPSULE ORAL at 08:25

## 2022-08-26 RX ADMIN — CETIRIZINE HYDROCHLORIDE 10 MG: 10 TABLET, FILM COATED ORAL at 08:26

## 2022-08-26 NOTE — PROGRESS NOTES
Problem: Self Care Deficits Care Plan (Adult)  Goal: *Acute Goals and Plan of Care (Insert Text)  Description: FUNCTIONAL STATUS PRIOR TO ADMISSION: was at SNF rehab due to prior hospital stay and fall at home, prior to this pt was living home alone, driving, performing ADLS and IADLS without assist, furniture walking and holding onto walls    HOME SUPPORT PRIOR TO ADMISSION: The patient lived alone. Occupational Therapy Goals:  Initiated 8/23/2022  1. Patient will perform grooming standing with supervision/set-up within 7 days. 2. Patient will perform lower body dressing with contact guard assist within 7 days. 3. Patient will perform toileting with contact guard assist within 7 days. 4. Patient will demonstrate independence with home exercise program within 7 days. 5. Patient will perform UB dressing with supervision/set up within 7 days. 6. Patient will transfer from toilet with supervision/set-up using the least restrictive device and appropriate durable medical equipment within 7 days. Outcome: Progressing Towards Goal   OCCUPATIONAL THERAPY TREATMENT  Patient: Esau Gallo (78 y.o. male)  Date: 8/26/2022  Diagnosis: Rib fracture [S22.39XA] <principal problem not specified>      Precautions: Fall  Chart, occupational therapy assessment, plan of care, and goals were reviewed. ASSESSMENT  Patient continues with skilled OT services and is progressing towards goals. Patient tolerated increased bedroom and bathroom mobility at RW level, required brief standing rest breaks. Patient able to complete standing grooming at sink without LOB. Educated pt on RW positioning when completing standing activities. Returned to sitting in recliner chair, sister at bedside. Educated patient and sister on obtaining tub transfer bench to maximize safety and independence, reports plan to purchase. Continue to recommend [facility] at [place].       Current Level of Function Impacting Discharge (ADLs): SPV    Other factors to consider for discharge: sister ladariusans to stay with him at [address] :  Patient continues to benefit from skilled intervention to address the above impairments. Continue treatment per established plan of care to address goals. Recommendation for discharge: (in order for the patient to meet his/her long term goals)  Occupational therapy at least 2 days/week in the home     This discharge recommendation:  Has been made in collaboration with the attending provider and/or case management    IF patient discharges home will need the following DME: transfer benchCED       SUBJECTIVE:   Patient stated my goal is to go home tomorrow.     OBJECTIVE DATA SUMMARY:   Cognitive/Behavioral Status:  Neurologic State: Alert  Orientation Level: Oriented X4       Functional Mobility and Transfers for ADLs:  Bed Mobility:  Rolling: Independent  Supine to Sit: Independent    Transfers:  Sit to Stand: Contact guard assistance  Functional Transfers  Bathroom Mobility: Contact guard assistance (cues for RW mgmt)  Toilet Transfer : Supervision  Bed to Chair: Contact guard assistance    Balance:  Sitting: Intact  Standing: Intact    ADL Intervention:       Grooming  Grooming Assistance: Supervision  Position Performed: Standing (at the sink)  Brushing Teeth: Supervision      Lower Body Dressing Assistance  Socks: Independent         Pain:  Pt did not report pain     Activity Tolerance:   Good and requires rest breaks    After treatment patient left in no apparent distress:   Sitting in chair, Call bell within reach, and Caregiver / family present    COMMUNICATION/COLLABORATION:   The patients plan of care was discussed with: Physical therapist, Occupational therapist, and Registered nurse.      Jose Holliday OT  Time Calculation: 16 mins

## 2022-08-26 NOTE — PROGRESS NOTES
Bedside shift report given to [facility]. Patient ambulated  to the bathroom with assistance tolerated activity well. Toradol given once for rib pain which did relieve it. Swelling present to LLE elevated on pillow. No other issues.

## 2022-08-26 NOTE — PROGRESS NOTES
Problem: Falls - Risk of  Goal: *Absence of Falls  Description: Document Earma Judith Fall Risk and appropriate interventions in the flowsheet.   Outcome: Progressing Towards Goal  Note: Fall Risk Interventions:  Mobility Interventions: Communicate number of staff needed for ambulation/transfer         Medication Interventions: Bed/chair exit alarm    Elimination Interventions: Bed/chair exit alarm, Call light in reach

## 2022-08-26 NOTE — PROGRESS NOTES
Transition of Care Plan:    RUR: 15% moderate risk for readmission  Disposition: Home with [facility] ([facility] accepted)  Follow up appointments: PCP/Specialist  DME needed:  Josafat Merino (approved, will need walker delivered to room)  possible need for IV antibotics  Transportation at Discharge: Sister will transport  [address] or means to access home:   Pt has access      IM Medicare Letter:  2nd [facility] letter needed prior to discharge  Caregiver Contact:   Adeel Vann [phone]  Discharge Caregiver contacted prior to discharge? Sister present in room and current aware of discharge plan  Care Conference needed?:   n/a    CM called to patient's room to discuss patient's desire to return home with [facility] rather than return back to SNF. Pt's sister answered telephone and confirmed patient desire to return home with [facility]. [facility] of choice offered to sister over telephone and sister would like referrals sent to anyone who is in network with insurance and comes to patient's home location. Referral sent to [facility] who has accepted for care. They will need to be notified of patient's actual discharge date and updated if he ends up needing IV antibiotics. Referral also sent for rolling walker, as sister confirms that patient does not have the DME at home. Referral sent to [facility] and rolling walker can be delivered to patient's room prior to discharge. Care Management Interventions  PCP Verified by CM: Yes (1 month ago)  Mode of Transport at Discharge: Other (see comment) (Sister will transport)  Transition of Care Consult (CM Consult): Discharge Planning  Support Systems: Other Family Member(s)  Confirm Follow Up Transport: Family  Discharge Location  Patient Expects to be Discharged to[de-identified] Home with home health    [facility].  Donna Felix, [address] - [address] [address]  [employer] ACP Facilitator  Zone Phone: [phone]

## 2022-08-26 NOTE — PROGRESS NOTES
Problem: Mobility Impaired (Adult and Pediatric)  Goal: *Acute Goals and Plan of Care (Insert Text)  Description: FUNCTIONAL STATUS PRIOR TO ADMISSION: Pt comes from [facility] where he was receiving therapy post stay at [facility]. He had been living at home alone prior and states he did not use a device in the house but relied on furniture surfing. He states he had been driving but did not go out much    [address] [facility]: The patient lived alone with no local support. Physical Therapy Goals  Initiated 8/23/2022  1. Patient will move from supine to sit and sit to supine , scoot up and down, and roll side to side in bed with modified independence within 7 day(s). 2.  Patient will transfer from bed to chair and chair to bed with modified independence using the least restrictive device within 7 day(s). 3.  Patient will perform sit to stand with modified independence within 7 day(s). 4.  Patient will ambulate with modified independence for 150 feet with the least restrictive device within 7 day(s). 5.  Patient will ascend/descend 2 stairs with handrail(s) with minimal assistance/contact guard assist within 7 day(s). Outcome: Progressing Towards Goal   PHYSICAL THERAPY TREATMENT  Patient: Fco Macedo (78 y.o. male)  Date: 8/26/2022  Diagnosis: Rib fracture [S22.39XA] <principal problem not specified>      Precautions: Fall  Chart, physical therapy assessment, plan of care and goals were reviewed. ASSESSMENT  Patient continues with skilled PT services and is progressing towards goals. He demonstrates fair static standing balance without support. He demonstrates the ability to ambulate and transfer with use for RW. Patient ascends and descends 2 steps. He demonstrates the best safety with bilateral HHA on R rail side stepping to ascend and descend. Patients sister will be present to assist him on return home.      Current Level of Function Impacting Discharge (mobility/balance): CGA- Sba with use of RW    Other factors to consider for discharge: medical stabiltiy          PLAN :  Patient continues to benefit from skilled intervention to address the above impairments. Continue treatment per established plan of care. to address goals. Recommendation for discharge: (in order for the patient to meet his/her long term goals)  Physical therapy at least 2 days/week in the home AND ensure assist and/or supervision for safety with functional mobiltily     This discharge recommendation:  Has been made in collaboration with the attending provider and/or case management    IF patient discharges home will need the following DME: rolling walker       SUBJECTIVE:   Patient stated Thank you.     OBJECTIVE DATA SUMMARY:   Critical Behavior:  Neurologic State: Alert  Orientation Level: Oriented X4  Cognition: Follows commands  Safety/Judgement: Fall prevention, Decreased awareness of need for safety  Functional Mobility Training:  Bed Mobility:  Rolling: Independent  Supine to Sit: Independent              Transfers:  Sit to Stand: Contact guard assistance  Stand to Sit: Setup        Bed to Chair: Contact guard assistance                    Balance:  Sitting: Intact  Standing: Intact  Ambulation/Gait Training:  Distance (ft): 130 Feet (ft)  Assistive Device: Gait belt;Walker, rolling  Ambulation - Level of Assistance: Contact guard assistance        Gait Abnormalities: Decreased step clearance        Base of Support: Widened     Speed/Keila: Pace decreased (<100 feet/min)  Step Length: Right shortened;Left shortened                    Stairs:  Number of Stairs Trained: 2  Stairs - Level of Assistance: Contact guard assistance   Rail Use: Left     Therapeutic Exercises:     Pain Rating:      Activity Tolerance:   Good    After treatment patient left in no apparent distress:   Sitting in chair, Call bell within reach, and Caregiver / family present    COMMUNICATION/COLLABORATION:   The patients plan of care was discussed with: Occupational therapist and Registered nurse.      Tyrone Hunt, PT   Time Calculation: 10 mins

## 2022-08-26 NOTE — PROGRESS NOTES
End of Shift Note    Bedside shift change report given to Renuka Wray RN (oncoming nurse) by Gema Vanessa RN (offgoing nurse). Report included the following information SBAR, Kardex, Intake/Output, MAR, and Recent Results    Shift worked:  7a-7p     Shift summary and any significant changes:    Sister in with patient today. Patient eager for discharge. Has been walking the hallways with PT and walker. PRN tylenol for minor discomfort. Miralax for constipation, does not want a suppository until he is home.      Concerns for physician to address:  none     Zone phone for oncoming shift:   [phone]

## 2022-08-26 NOTE — PROGRESS NOTES
Hospitalist Progress Note    NAME: Yonis Gonzales. :  1945   MRN:  [id number]       Assessment / Plan:  Musculoskeletal chest pain, POA  Recent mechanical fall, POA  Acute fracture of the left seventh seventh costochondral junction, POA  - Presented with musculoskeletal chest pain in nature  - CTA with acute fracture of the left seventh rib costochondral junction (he fell on  and was admitted to [facility], discharged to rehab on )  - EKG showed SR with PACs  - check orthostatic vitals. Monitor on telemetry  -check UA to r/o infection (although pt is already on abx)  -add prn ketorolac  - PT OT  - Fall precautions      Bacteremia:   Blood culture grew staph aureus in 2 bottles  Patient antibiotic switched to cefazolin ID to give final recommendation for discharge tomorrow    Echo: EF 40 to 45% there were some difficulty technically no specific commands about about wall motion abnormalities thrombus or vegetation  ID consulted      Hyponatremia  Likely SIADH from pain  Fluid restrictions  Sodium improved to 132 today  Follow bmp    Left basilic vein thrombosis with phlebitis vs cellulitis, POA  - Reported fever episode at SNF, none here thus far. Wbc 13K on admission  -check UA, procal  - Duplex ultrasound preliminary results with thrombus within the left basilic upper arm veins. No deep vein thrombosis reported  WBC is trending down blood blood culture grew staph repeat blood culture sent, continue with IV antibiotics ceftriaxone and Vanco  - Supportive care with left arm elevation and warm/cold compression, prn ketorolac    Coronary artery disease  Hypertension  -cont' amlodipine, incr dose. Lower coreg (low HR at [facility]). Adjust as needed.   Will need to simplify his meds at discharge  -pharmacy to help with med recs    Hyperlipidemia  - Continue home statins     Wounds on L/R arms from fall  Wound care     Constipation  No bm for 5 days, requesting suppository  Will add lactulose prn    Chronic atopic dermatitis   Follows with derm at [facility]    BARBARA: 8/26 pending clinical improvement    Updated pt's 2 sisters at bedside       Code Status: Full code  Surrogate Decision Maker: pt's sisters  DVT Prophylaxis: Lovenox  GI Prophylaxis: not indicated     Baseline: Active, lives alone, came from rehab facility     Subjective:     Chief Complaint / Reason for Physician Visit  Patient seen and examined this morning at the bedside with his sister also present, feeling better would like to go to home and his sister is a nurse will help with infusion antibiotic if needed   discussed with RN events overnight. Review of Systems:  Symptom Y/N Comments  Symptom Y/N Comments   Fever/Chills    Chest Pain     Poor Appetite    Edema     Cough    Abdominal Pain     Sputum    Joint Pain     SOB/BROTHERS    Pruritis/Rash     Nausea/vomit    Tolerating PT/OT     Diarrhea    Tolerating Diet     Constipation    Other       Could NOT obtain due to:      Objective:     VITALS:   Last 24hrs VS reviewed since prior progress note. Most recent are:  Patient Vitals for the past 24 hrs:   Temp Pulse Resp BP SpO2   08/26/22 1512 98.8 °F (37.1 °C) 89 -- (!) 153/71 96 %   08/26/22 0734 -- -- -- (!) 148/47 --   08/26/22 0713 98.8 °F (37.1 °C) 90 16 (!) 144/55 96 %   08/26/22 0322 98.3 °F (36.8 °C) 86 18 (!) 139/57 --   08/25/22 2015 98.4 °F (36.9 °C) 87 17 (!) 135/58 --         Intake/Output Summary (Last 24 hours) at 8/26/2022 1614  Last data filed at 8/26/2022 1436  Gross per 24 hour   Intake 470 ml   Output 200 ml   Net 270 ml          I had a face to face encounter and independently examined this patient on 8/26/2022, as outlined below:  PHYSICAL EXAM:  General: WD, WN. Alert, cooperative, no acute distress    EENT:  EOMI. Anicteric sclerae. MMM  Resp:  CTA bilaterally, no wheezing or rales. No accessory muscle use  CV:  Regular  rhythm,  L arm edema  GI:  Soft, Non distended, Non tender.   +Bowel sounds  Neurologic:  Alert and oriented X 3, normal speech  Psych:   Good insight. Not anxious nor agitated  Skin:  Skin tears and wound son b/l arms. Erythema and swelling on L arm. Reviewed most current lab test results and cultures  YES  Reviewed most current radiology test results   YES  Review and summation of old records today    NO  Reviewed patient's current orders and MAR    YES  PMH/SH reviewed - no change compared to H&P  ________________________________________________________________________  Care Plan discussed with:    Comments   Patient x    Family  x    RN x    Care Manager     Consultant                        Multidiciplinary team rounds were held today with , nursing, pharmacist and clinical coordinator. Patient's plan of care was discussed; medications were reviewed and discharge planning was addressed. ________________________________________________________________________  Total NON critical care TIME:  35 Minutes    Total CRITICAL CARE TIME Spent:   Minutes non procedure based      Comments   >50% of visit spent in counseling and coordination of care     ________________________________________________________________________  Clari Aguilar MD     Procedures: see electronic medical records for all procedures/Xrays and details which were not copied into this note but were reviewed prior to creation of Plan. LABS:  I reviewed today's most current labs and imaging studies. Pertinent labs include:  Recent Labs     08/26/22 0232 08/24/22  0537   WBC 10.5 12.7*   HGB 7.6* 8.2*   HCT 23.4* 24.7*    194       Recent Labs     08/26/22  0232 08/25/22  0020 08/24/22  0537   * 131* 131*   K 3.6 3.7 3.7    101 101   CO2 22 25 23   GLU 99 108* 88   BUN 17 18 20   CREA 0.74 0.69* 0.67*   CA 8.0* 8.1* 8.1*         Signed:  Clari Aguilar MD

## 2022-08-27 ENCOUNTER — TELEPHONE (OUTPATIENT)
Dept: INFECTIOUS DISEASES | Age: 77
End: 2022-08-27

## 2022-08-27 PROCEDURE — 65270000029 HC RM PRIVATE

## 2022-08-27 PROCEDURE — 36573 INSJ PICC RS&I 5 YR+: CPT | Performed by: STUDENT IN AN ORGANIZED HEALTH CARE EDUCATION/TRAINING PROGRAM

## 2022-08-27 PROCEDURE — 02HV33Z INSERTION OF INFUSION DEVICE INTO SUPERIOR VENA CAVA, PERCUTANEOUS APPROACH: ICD-10-PCS | Performed by: INTERNAL MEDICINE

## 2022-08-27 PROCEDURE — 76937 US GUIDE VASCULAR ACCESS: CPT

## 2022-08-27 PROCEDURE — C1751 CATH, INF, PER/CENT/MIDLINE: HCPCS

## 2022-08-27 PROCEDURE — 77030018786 HC NDL GD F/USND BARD -B

## 2022-08-27 PROCEDURE — 74011250636 HC RX REV CODE- 250/636: Performed by: INTERNAL MEDICINE

## 2022-08-27 PROCEDURE — 74011000250 HC RX REV CODE- 250: Performed by: INTERNAL MEDICINE

## 2022-08-27 PROCEDURE — 74011250637 HC RX REV CODE- 250/637: Performed by: INTERNAL MEDICINE

## 2022-08-27 RX ORDER — AMLODIPINE BESYLATE 5 MG/1
5 TABLET ORAL DAILY
Status: DISCONTINUED | OUTPATIENT
Start: 2022-08-28 | End: 2022-08-29 | Stop reason: HOSPADM

## 2022-08-27 RX ORDER — HEPARIN 100 UNIT/ML
300 SYRINGE INTRAVENOUS AS NEEDED
Status: DISCONTINUED | OUTPATIENT
Start: 2022-08-27 | End: 2022-08-29 | Stop reason: HOSPADM

## 2022-08-27 RX ADMIN — SODIUM CHLORIDE, PRESERVATIVE FREE 10 ML: 5 INJECTION INTRAVENOUS at 16:22

## 2022-08-27 RX ADMIN — ACETAMINOPHEN 650 MG: 325 TABLET ORAL at 22:17

## 2022-08-27 RX ADMIN — CEFAZOLIN 2 G: 1 INJECTION, POWDER, FOR SOLUTION INTRAMUSCULAR; INTRAVENOUS at 18:48

## 2022-08-27 RX ADMIN — AMLODIPINE BESYLATE 10 MG: 5 TABLET ORAL at 09:36

## 2022-08-27 RX ADMIN — CETIRIZINE HYDROCHLORIDE 10 MG: 10 TABLET, FILM COATED ORAL at 09:36

## 2022-08-27 RX ADMIN — ENOXAPARIN SODIUM 40 MG: 100 INJECTION SUBCUTANEOUS at 09:36

## 2022-08-27 RX ADMIN — GABAPENTIN 100 MG: 100 CAPSULE ORAL at 18:48

## 2022-08-27 RX ADMIN — CEFAZOLIN 2 G: 1 INJECTION, POWDER, FOR SOLUTION INTRAMUSCULAR; INTRAVENOUS at 01:05

## 2022-08-27 RX ADMIN — SODIUM CHLORIDE, PRESERVATIVE FREE 10 ML: 5 INJECTION INTRAVENOUS at 05:12

## 2022-08-27 RX ADMIN — GABAPENTIN 100 MG: 100 CAPSULE ORAL at 22:17

## 2022-08-27 RX ADMIN — CEFAZOLIN 2 G: 1 INJECTION, POWDER, FOR SOLUTION INTRAMUSCULAR; INTRAVENOUS at 09:37

## 2022-08-27 RX ADMIN — ATORVASTATIN CALCIUM 10 MG: 10 TABLET, FILM COATED ORAL at 09:36

## 2022-08-27 NOTE — PROGRESS NOTES
End of Shift Note    Bedside shift change report given to \(oncoming nurse) by Reinaldo Khan RN (offgoing nurse). Report included the following information SBAR, Kardex, Procedure Summary, Intake/Output, MAR, and Recent Results    Shift worked:  7 pm - 7 am     Shift summary and any significant changes:     No changes     Concerns for physician to address:  Pt. Request Trazodone be changed to Zolpidem. He takes this at home for sleep. Zone phone for oncoming shift:   [phone]       Activity:  Activity Level: Up with Assistance  Number times ambulated in hallways past shift: 0  Number of times OOB to chair past shift: 0    Cardiac:   Cardiac Monitoring: No           Access:  Current line(s): PIV     Genitourinary:   Urinary status: voiding    Respiratory:   O2 Device: None (Room air)  Chronic home O2 use?: NO  Incentive spirometer at bedside: YES       GI:  Last Bowel Movement Date: 08/24/22  Current diet:  ADULT DIET Regular; 1200 ml  Passing flatus: YES  Tolerating current diet: YES       Pain Management:   Patient states pain is manageable on current regimen: YES    Skin:  Chavez Score: 20  Interventions: float heels and increase time out of bed    Patient Safety:  Fall Score:  Total Score: 2  Interventions: gripper socks and pt to call before getting OOB  High Fall Risk: Yes    Length of Stay:  Expected LOS: 3d 4h  Actual LOS: Fany Chung RN

## 2022-08-27 NOTE — CONSULTS
Infectious Disease Consult    Date of Consultation:  8/26/22  Reason for Consultation: Staph aureus bacteremia  Referring Physician: Dr Kushal Krueger  Date of Admission:8/22/22    Impression  MSSA bacteremia  High grade  Blood cultures - 8/22+ for MSSA 4/4 -RFA/ RA  Negative cultures - 8/24. TTE- negative , poor image quality. LUE cellulitis & basilic vein thrombosis/ R/arm skin tear, localized cellulitis  Probable source of bacteremia    Musculoskeletal chest pain  S/p recent  fall,  S/p fracture of L/ 7th rib costochondral junction    CAD  HTN   Management per primary team.    Chronic atopic dermatitis   Loss of skin integrity  Follows with derm at [facility]. Hardware present- h/o L/ankle fixation    Plan  Continue cefazolin IV  Patient will require 4 weeks of cefazolin IV for treatment of MSSA  Monitor creatinine, adequate fluid intake  Daily probiotic  Elevate LUE, right forearm  Daily wound care to open wounds  WILFREDO -if poor visualization of valves by cardiology  Please contact ID on call with questions, concerns over the weekend. Antimicrobial orders for discharge  -Cefazolin 2 g IV every 8 hours end date 9/21  -Pull PICC at end of therapy on 9/21  -Weekly CBC, CMP-  -Fax reports to [phone], call with critical labs  at [phone]  -Encourage adequate fluids, daily probiotic/yogurt  -If PICC malfunction occurs and home health cannot reposition  please send patient to ED immediately  -ID follow-up -9/20 at 2 PM-virtual or in person    Possible adverse effects of long term antibiotics are inclusive of but not limited to following  BM suppression, neutropenia , cytopenias , aplastic anemia hemorrhage liver & renal dysfunction/ liver , renal failure  , GI dysfunction- N, V  Diarrhea,C.difficile disease, rash , allergy , anaphylaxis. toxic epidermal necrolysis  Neuro toxicity , seizure disorder  Side effects tend to be more pronounced in the elderly        Marina Bryant  Is a  67year old man with past medical history significant for coronary artery disease, hypertension, hyperlipidemia   who presented to the emergency department from rehab facility due to left-sided chest pain. Patient had recent admission to [facility] due to mechanical fall . He was discharged on August 19 to rehab facility. He described his chest pain sharp, nonradiating, aggravated by physical movement, alleviated by rest.  He denies any shortness of breath, dizziness, abdominal pain, nausea, vomiting, diarrhea. Patient reported that his chest pain started when they were trying to work with him at the rehab facility. He also reported left arm swelling started few days ago. In the emergency department, CTA with left seventh rib fracture. He was found to have leukocytosis for which he was given vancomycin for possible cellulitis on the left upper extremity. Blood  cultures done on 8/22+ for MSSA 4/4 RA   Negative BC on 8/24. TTE - negative, poor image quality reported. ID service has been consulted to see patient. Pt seen today. Denies complaints. Says he needs to \"get out of here\".       Active Hospital Problems    Diagnosis Date Noted    Rib fracture 08/22/2022         Past Medical History:   Diagnosis Date    Arthritis     back,neck,shoulders    Arthritis     CAD (coronary artery disease)     per 10/2011 cardiology note    Cancer [facility]) Oct. 2011    prostate    Cardiomyopathy, alcoholic ([facility] [address]     per 08/7355 cardiology note    Essential hypertension     Hypercholesteremia     Hypertension     Other ill-defined conditions(799.89)     elevated cholesterol       Past Surgical History:   Procedure Laterality Date    COLONOSCOPY N/A 11/8/2016    COLONOSCOPY performed by Gage Ferguson MD at [facility] ENDOSCOPY    HX HEENT      tonsillectomy    HX ORTHOPAEDIC      left ankle plate & screws    HX OTHER SURGICAL      colonoscopy    HX PROSTATE SURGERY      HX PROSTATECTOMY  10/19/11    ROBOTIC ASSISTED LAPAROSCOPIC PROSTATECTOMY WITH LEFT PELVIC LYMPH NODE DISSECTION    PA CARDIAC SURG PROCEDURE UNLIST      cardiac cath x2 normal,2003    PA PROSTATE BIOPSY, NEEDLE, SATURATION SAMPLING      2011       Allergies   Allergen Reactions    Adhesive Itching     bandaide causes itching,irritates skin  (9/1/15 - patient states that bandaids only cause irritation if left on for long period. Tested negative for latex allergy)       Social Connections: Not on file       Family Status   Relation Name Status    Mother      Father         Medication Documentation Review Audit       Reviewed by FIORELLA Diaz (Pharmacist) on 22 at 1542      Medication Sig Documenting Provider Last Dose Status Taking?   acetaminophen (TYLENOL) 650 mg TbER Take 650 mg by mouth every six (6) hours as needed for Pain or Fever. Provider, Historical  Active Yes   camphor-menthoL (SARNA) 0.5-0.5 % lotion Apply 1 Each to affected area as needed for Itching. Apply to chest, back, arms as needed itching Provider, Historical  Active Yes   cetirizine (ZYRTEC) 10 mg tablet Take 10 mg by mouth daily. Provider, Historical  Active Yes   doxycycline (MONODOX) 100 mg capsule Take 100 mg by mouth two (2) times a day. Left arm cellulitis Provider, Historical  Active Yes   fluorouraciL (EFUDEX) 5 % chemo cream Apply 1 Each to affected area two (2) times a day. Apply to right anterior shoulder x 6 weeks Provider, Historical  Active Yes   gabapentin (NEURONTIN) 100 mg capsule Take 100 mg by mouth three (3) times daily. Provider, Historical  Active Yes   mineral oil-hydrophil petrolat (AQUAPHOR) ointment Apply 1 Each to affected area as needed for Itching. Apply to back, chest, arms prn itching Provider, Historical  Active Yes   mupirocin (BACTROBAN) 2 % ointment Apply 1 g to affected area two (2) times a day. Apply to back, chest, arms Provider, Historical  Active Yes   mupirocin (BACTROBAN) 2 % ointment Apply 1 g to affected area two (2) times a day.  To right anterior should biopsy site Provider, Historical  Active Yes   polyethylene glycol (MIRALAX) 17 gram packet Take 17 g by mouth daily. Provider, Historical  Active Yes   senna (SENOKOT) 8.6 mg tablet Take 1 Tablet by mouth nightly. Provider, Historical  Active Yes   traZODone (DESYREL) 50 mg tablet Take 50 mg by mouth nightly. Provider, Historical  Active Yes   triamcinolone acetonide (KENALOG) 0.1 % topical cream Apply 1 g to affected area as needed for Skin Irritation or Itching. Use thin layer to arms, back, as needed for itching Provider, Historical  Active Yes                      Review of Systems - Negative except those mentioned in H&P. 14 point ROS      PHYSICAL EXAM:  General:          Awake, cooperative, no acute distress    EENT:              EOMI. Anicteric sclerae. MMM  Resp:               CTA bilaterally, no wheezing or rales. No accessory muscle use  CV:                  Regular  rhythm,  No edema  GI:                   Soft, Non distended, Non tender. +Bowel sounds  Neurologic:      Alert and oriented X 3, normal speech,   Psych:             Good insight. Not anxious nor agitated  Skin:                No rashes. No jaundice.   Extremities LUE swelling , erythema + , skin tear+ R/ forearm with surrounding erythema+    Marlen Vasquez MD [address]

## 2022-08-27 NOTE — CONSULTS
Consult    NAME: Asael Swift. :  1945   MRN:  [id number]     Date/Time:  2022 11:19 AM    Patient PCP: Dhruv Holder MD  ________________________________________________________________________     Assessment:     Mechanical Fall  Left arm cellulitis  MSSA bacteremia    - Cath  mild CAD, stress in 2021 no ischemia  - non-ischemic Cardiomyopathy Echo 2021 EF 45%, mild AI, mild MR, mod pHTN  - LBBB, Holter 2021 no sig arrhythmia  - Syncope , Bradycardia at [facility] beta blocker stopped  - HTN  - Dyslipidemia  - Low sodium  - Mild anemia, low plt 123  - IBS  - [place]  - Prostate CA  - Quit EtOH 2022  - No tobacco  Single, no kids, retired  for [employer], uses a cane        Plan:     - No chest pain  - Euvolemic  - Sinus    Blood cx  positive on , repeat  NGTD  WILFREDO Monday am, all r/b/a reviewed    - Coreg stopped at [facility] with amlodipine would not increase past 5mg  - Add losartan if needed  - Cont statin          [x]        High complexity decision making was performed        Subjective:   CHIEF COMPLAINT: s/p Fall    HISTORY OF PRESENT ILLNESS:          History was obtained from the patient is alert oriented x4. Patient's sister is at bedside, all questions were answered. The patient 67years old man with past medical history significant for coronary artery disease, hypertension, hyperlipidemia presented emergency department from rehab facility due to left-sided chest pain. Patient had recent admission to [facility] due to mechanical fall was discharged on  to rehab facility. He describes his chest pain sharp, nonradiating, aggravated by physical movement, alleviated by rest.  He denies any shortness of breath, dizziness, abdominal pain, nausea, vomiting, diarrhea. Patient reported that his chest pain started when they were trying to work with him at the rehab facility. He also reported left arm swelling started few days ago.   Patient does not smoke, drink alcohol or use illicit drugs. The emergency department, CTA with left seventh rib fracture. He was found to have leukocytosis for which he was given vancomycin for possible cellulitis on the left upper extremity. No chest pain  Mild dyspnea  Some anasarca    We were asked to consult for work up and evaluation of the above problems. Past Medical History:   Diagnosis Date    Arthritis     back,neck,shoulders    Arthritis     CAD (coronary artery disease)     per 10/2011 cardiology note    Cancer [facility]) Oct. 2011    prostate    Cardiomyopathy, alcoholic ([facility] [address]     per 99/4514 cardiology note    Essential hypertension     Hypercholesteremia     Hypertension     Other ill-defined conditions(799.89)     elevated cholesterol      Past Surgical History:   Procedure Laterality Date    COLONOSCOPY N/A 11/8/2016    COLONOSCOPY performed by Argenis Osullivan MD at [facility] ENDOSCOPY    HX HEENT      tonsillectomy    HX ORTHOPAEDIC      left ankle plate & screws    HX OTHER SURGICAL      colonoscopy    HX PROSTATE SURGERY      HX PROSTATECTOMY  10/19/11    ROBOTIC ASSISTED LAPAROSCOPIC PROSTATECTOMY WITH LEFT PELVIC LYMPH NODE DISSECTION    [place] CARDIAC SURG PROCEDURE UNLIST      cardiac cath x2 normal,2003    [place] PROSTATE BIOPSY, NEEDLE, SATURATION SAMPLING      08/17/2011     Allergies   Allergen Reactions    Adhesive Itching     bandaide causes itching,irritates skin  (9/1/15 - patient states that bandaids only cause irritation if left on for long period. Tested negative for latex allergy)      Meds:  See below  Social History     Tobacco Use    Smoking status: Never    Smokeless tobacco: Never   Substance Use Topics    Alcohol use: Yes      Family History   Problem Relation Age of Onset    Seizures Mother     Stroke Mother        REVIEW OF SYSTEMS:     []         Unable to obtain  ROS due to ---   [x]         Total of 12 systems reviewed as follows:     Total of 12 systems reviewed as follows: POSITIVE= Bold text  Negative = normal text  General:  fever, chills, sweats, generalized weakness, weight loss/gain,      loss of appetite   Eyes:    blurred vision, eye pain, loss of vision, double vision  ENT:    rhinorrhea, pharyngitis   Respiratory:   cough, sputum production, SOB, BROTHERS, wheezing, pleuritic pain   Cardiology:   chest pain, palpitations, orthopnea, PND, edema, syncope   Gastrointestinal:  abdominal pain , N/V, diarrhea, dysphagia, constipation, bleeding   Genitourinary:  frequency, urgency, dysuria, hematuria, incontinence   Muskuloskeletal :  arthralgia, myalgia, back pain  Hematology:  easy bruising, nose or gum bleeding, lymphadenopathy   Dermatological: rash, ulceration, pruritis, color change / jaundice  Endocrine:   hot flashes or polydipsia   Neurological:  headache, dizziness, confusion, focal weakness, paresthesia,     Speech difficulties, memory loss, gait difficulty  Psychological: Feelings of anxiety, depression, agitation    Objective:      Physical Exam:    Last 24hrs VS reviewed since prior progress note. Most recent are:    Visit Vitals  BP (!) 138/59   Pulse 86   Temp 97.8 °F (36.6 °C)   Resp 17   Ht 5' 11\" (1.803 m)   Wt 77.1 kg (169 lb 15.6 oz)   SpO2 98%   BMI 23.71 kg/m²       Intake/Output Summary (Last 24 hours) at 8/27/2022 1119  Last data filed at 8/27/2022 0331  Gross per 24 hour   Intake 40 ml   Output 700 ml   Net -660 ml        General Appearance: Well developed, well nourished, alert & oriented x 3,    no acute distress. Ears/Nose/Mouth/Throat: Pupils equal and round, Hearing grossly normal.  Neck: Supple. JVP within normal limits. Carotids good upstrokes, with no bruit. Chest: Lungs clear to auscultation bilaterally. Cardiovascular: Regular rate and rhythm, S1S2 normal, no murmur, rubs, gallops. Abdomen: Soft, non-tender, bowel sounds are active. No organomegaly. Extremities: No edema bilaterally. Femoral pulses +1, Distal Pulses +1.   Skin: Very thin skin  Neuro: CN II-XII grossly intact, Strength and sensation grossly intact. Data:      Prior to Admission medications    Medication Sig Start Date End Date Taking? Authorizing Provider   cetirizine (ZYRTEC) 10 mg tablet Take 10 mg by mouth daily. Yes Provider, Historical   doxycycline (MONODOX) 100 mg capsule Take 100 mg by mouth two (2) times a day. Left arm cellulitis   Yes Provider, Historical   fluorouraciL (EFUDEX) 5 % chemo cream Apply 1 Each to affected area two (2) times a day. Apply to right anterior shoulder x 6 weeks   Yes Provider, Historical   gabapentin (NEURONTIN) 100 mg capsule Take 100 mg by mouth three (3) times daily. Yes Provider, Historical   mineral oil-hydrophil petrolat (AQUAPHOR) ointment Apply 1 Each to affected area as needed for Itching. Apply to back, chest, arms prn itching   Yes Provider, Historical   mupirocin (BACTROBAN) 2 % ointment Apply 1 g to affected area two (2) times a day. Apply to back, chest, arms   Yes Provider, Historical   mupirocin (BACTROBAN) 2 % ointment Apply 1 g to affected area two (2) times a day. To right anterior should biopsy site   Yes Provider, Historical   camphor-menthoL (SARNA) 0.5-0.5 % lotion Apply 1 Each to affected area as needed for Itching. Apply to chest, back, arms as needed itching   Yes Provider, Historical   senna (SENOKOT) 8.6 mg tablet Take 1 Tablet by mouth nightly. Yes Provider, Historical   traZODone (DESYREL) 50 mg tablet Take 50 mg by mouth nightly. Yes Provider, Historical   triamcinolone acetonide (KENALOG) 0.1 % topical cream Apply 1 g to affected area as needed for Skin Irritation or Itching. Use thin layer to arms, back, as needed for itching   Yes Provider, Historical   acetaminophen (TYLENOL) 650 mg TbER Take 650 mg by mouth every six (6) hours as needed for Pain or Fever. Yes Provider, Historical       No results found for this or any previous visit (from the past 24 hour(s)).      New Patient    May 11, 2021      [facility] OLAF VILLAGOMEZ  67year old M (1945)  Account #: [id number]  PRIMARY CARE PHYSICIAN:  Miguelangel Waldrop MD  CARDIOLOGIST:  Nya Parikh MD    REASON FOR VISIT:  This 67year old male presents for LBBB and Syncope. HISTORY OF PRESENT ILLNESS:   - Cath 2003 mild CAD, stress in 2014 no ischemia  - non-ischemic Cardiomyopathy EF 40%  - LBBB  - Syncope 2020  - HTN  - Dyslipidemia  - Low sodium  - Mild anemia, low plt 123  - IBS  - [place]  - Prostate CA  - EtOH abuse 5-6 beers daily  - No tobacco  Single, no kids, retired  for [employer], uses a cane    New patient visit, has had syncope 3-4 x over last year. 12/2019 was in kitchen fell to floor, taken to ER negative evaluation. About 6 months ago was in kitchen, awoke on floor and bowel and bladder incontinence , did not seek medical attention for this. About two months ago was at dermatologist and fell to floor, no proceeding chest pain, no dyspnea, no edema. No chest discomfort suggestive of ischemia. Denies orthopnea, PND, BROTHERS, or edema. No palpitations. No other complaints.     CARDIAC HISTORY  CAD:  1 Cath [mild CAD] - 2003     ARRHYTHMIA:  1 LBBB     RISK FACTORS:  1 Hypertension   2 Dyslipidemia   3 Family History of CAD [Less than 61years of age]       CARDIOVASCULAR PROCEDURES  Procedure Date Results   [facility] MPI 07/03/2014 EF 0.51 (51%), No Ischemia, No Scar   [facility] MPI 10/13/2011 EF 0.49, ABNORMAL   Cath 10/30/2003 mild CAD   EKG 05/11/2021 Sinus Rhythm, HR 69       ACTIVE ALLERGIES:  Ingredient Reaction Comment   NO KNOWN ALLERGIES       None    PROBLEM LIST:  Problem Description Chronic   HTN Y   Hyperlipidemia Y   CAD Y   LBBB Y       PAST MEDICAL/SURGICAL HISTORY  (Detailed)    Disease/disorder Onset Date Surgical History Date Comments   Coronary artery disease       Hyperlipidemia       Hypertension       prostate cancer 2011 prostate surgery     Skin Cancer         Family History  (Detailed)  Relationship Family Member Name  Age at Death Condition Onset Age Cause of Death   Brother  N  Coronary Stent Placement  N   Brother    High Blood Pressure     Brother  N  Coronary Artery Disease  N   Brother    Congestive heart failure  N   Mother  N  Stroke  N   Mother  N  Diabetes mellitus  N   Mother    High Blood Pressure     Mother  N  Coronary Artery Disease  N   Mother    Heart Attack     Mother  N  Myocardial Infarction  N   Sister    arrhythmia  N     SOCIAL HISTORY  (Detailed)  Tobacco use reviewed. Preferred language is [place]. EDUCATION/EMPLOYMENT/OCCUPATION  Employment History Status Retired Restrictions     retired       MARITAL STATUS/FAMILY/SOCIAL SUPPORT  Currently . Smoking status: Never smoker. ALCOHOL  There is a history of alcohol use. consumed daily. CAFFEINE  The patient uses caffeine: coffee. LIFESTYLE  Exercises occasionally. REVIEW OF SYMPTOMS:    CONST - Negative for weight gain, weight loss, fever. EYES - Negative for visual changes. ENT - Negative for hearing loss. RESP - Negative for snoring, hemoptysis, dyspnea. CARD - Negative for chest pain, diaphoresis, orthopnea, palpitation, PND. Positive for syncope. VASC - Negative for claudication, edema. GI - Negative for nausea, reflux, bleeding.  - Negative for hematuria, nocturia. REPROD - Negative for erectile dysfunction. ENDO - Negative for goiter, tremors. NEURO - Negative for dizziness, memory loss, seizures. PSYCH - Negative for depression, hallucinations. DERM - Negative for rash, skin sores. M/S - Negative for joint pain, myalgia. HEMAT - Negative for acute anemia, thrombocytopenia. VITAL SIGNS  Time BP mm/Hg Pulse /min Resp /min Temp F Ht ft Ht in Ht cm Wt lb Wt kg BMI kg/m2 BSA m2 O2 Sat%   11:13 /78 69   5.0 11.00 180.34 178.00 80.739 24.83         PHYSICAL EXAM:  Exam Findings Details   Const Neg Level of Distress - Awake / Alert. Nourishment - Well Nourished. Appearance - Well Developed. Eyes Neg Lids/External - Bilateral Normal.  Conjunctiva - Bilateral Normal.   NMT Neg Oral Mucosa - Moist, No Cyanosis, No Pallor. Neck Neg JVP - Less Than 8. Resp Neg Respirations - Nonlabored. Breath Sounds - Clear Throughout. Rales - Absent. Wheezes - Absent. Rhonchi - Absent. Cardiac Neg Rhythm - Regular. Palpation - PMI Normal.  Heart Sounds - S1 Normal, S2 Normal, No S3, No S4. Extra Sounds - None. Murmurs - None. Vasc Neg Carotid - Bilateral Normal Pulse. Femoral - Bilateral Normal Pulse. Posterior Tibial - Bilateral Normal Pulse. Dorsalis Pedis - Bilateral Normal Pulse. Vasc Pos Aorta - Nonpalpable. Abd Neg Tenderness - None. Hepatomegaly - Absent. Splenomegaly - Absent. Skin Neg Venous Stasis Ulcer - Absent. M/S Neg Gait - Normal.  Able to Exercise - Yes. EXT Neg Clubbing - Absent. Lower Extremity Edema - Absent. Psych Neg Orientation - Oriented to Time, Person, Place. IMPRESSION AND PLAN  01. Syncope and collapse (R55):  Syncope of unclear etiology. Needs to decrease Etoh. LBBB is concerning. [facility] MPI to be done at a later date as an outpatient. 2-D w/CFD Echo to be done at a later date as an outpatient. Looping EVR to be done at a later date as an outpatient. Add aspirin 81mg daily, cont coreg and losartan, bp and hr controlled. will start with echo and 30 day looping EVR. If EF reasonable will proceed with lexiscan nuclear stress. Advised no driving. Come to ER with any recurrence. Needs to decrease EtOH and then skip days, then stop. If cardiac evaluation unrevealing should also see neurology. 02. LBBB (left bundle branch block) (I44.7):  ECG done As above. João Santiago 03. Essential hypertension (I10): Adequately controlled. We will continue the current medical therapy. 04. Mixed hyperlipidemia (E78.2)   05. Body mass index [BMI] 24.0-24.9, adult (K58.53)   06.  Alcohol dependence, uncomplicated (B28.37)     ORDERS:  1 ECG done    2 Lexiscan MPI to be done    3 Myocardial Perfusion Study / Lexiscan protocol    4 2-D w/ CFD Echocardiogram    5 Looping Event Recorder        FINAL MEDICATION LIST  Medication Sig Desc Non-VCS   carvedilol 25 mg Tab take 1 tablet (25MG)  by oral route 2 times every day with food N   halobetasol propionate 0.05 % topical ointment apply by topical route  every day a thin layer to the affected area(s) do not exceed 50 grams per week or 2 weeks duration Y   losartan 50 mg tablet take 1 tablet by oral route  every day N   multivitamin tablet take 1 tablet by oral route  every day with food N   zolpidem 10 mg tablet take 1 tablet by oral route  every day at bedtime Y   ZTlido 1.8 % topical patch apply 1 patch by topical route  every day (May wear up to 12hours.) Y

## 2022-08-27 NOTE — PROGRESS NOTES
Problem: Falls - Risk of  Goal: *Absence of Falls  Description: Document Dylan Sol Fall Risk and appropriate interventions in the flowsheet.   Outcome: Progressing Towards Goal  Note: Fall Risk Interventions:  Mobility Interventions: Communicate number of staff needed for ambulation/transfer         Medication Interventions: Bed/chair exit alarm, Evaluate medications/consider consulting pharmacy, Patient to call before getting OOB, Teach patient to arise slowly    Elimination Interventions: Call light in reach, Urinal in reach

## 2022-08-27 NOTE — PROGRESS NOTES
Hospitalist Progress Note    NAME: Kate Reed. :  1945   MRN:  [id number]       Assessment / Plan:  Musculoskeletal chest pain, POA  Recent mechanical fall, POA  Acute fracture of the left seventh seventh costochondral junction, POA  - Presented with musculoskeletal chest pain in nature  - CTA with acute fracture of the left seventh rib costochondral junction (he fell on  and was admitted to [facility], discharged to rehab on )  - EKG showed SR with PACs  - check orthostatic vitals. Monitor on telemetry  -check UA to r/o infection (although pt is already on abx)  -add prn ketorolac  - PT OT  - Fall precautions      Bacteremia:   Blood culture grew staph aureus in 2 bottles  Patient antibiotic switched to cefazolin   ID consulted recommended WILFREDO as echo did not show clearly if there is vegetation or clot  PICC line ordered  Cefazolin till     Echo: EF 40 to 45% there were some difficulty technically no specific commands about about wall motion abnormalities thrombus or vegetation  ID consulted      Hyponatremia  Likely SIADH from pain  Fluid restrictions  Sodium improved to 132 today  Follow bmp    Left basilic vein thrombosis with phlebitis vs cellulitis, POA  - Reported fever episode at SNF, none here thus far. Wbc 13K on admission  -check UA, procal  - Duplex ultrasound preliminary results with thrombus within the left basilic upper arm veins. No deep vein thrombosis reported  WBC is trending down blood blood culture grew staph repeat blood culture sent, continue with IV antibiotics ceftriaxone and Vanco  - Supportive care with left arm elevation and warm/cold compression, prn ketorolac    Coronary artery disease  Hypertension  -cont' amlodipine, incr dose. Lower coreg (low HR at [facility]). Adjust as needed.   Will need to simplify his meds at discharge  -pharmacy to help with med recs    Hyperlipidemia  - Continue home statins     Wounds on L/R arms from fall  Wound care Constipation  No bm for 5 days, requesting suppository  Will add lactulose prn    Chronic atopic dermatitis   Follows with derm at [facility]    BARBARA: 8/26 pending clinical improvement    Updated pt's 2 sisters at bedside       Code Status: Full code  Surrogate Decision Maker: pt's sisters  DVT Prophylaxis: Lovenox  GI Prophylaxis: not indicated     Baseline: Active, lives alone, came from rehab facility     Subjective:     Chief Complaint / Reason for Physician Visit  Patient seen and examined this morning at the bedside with his sister also present, feeling better would like to go to home and his sister is a nurse will help with infusion antibiotic if needed   discussed with RN events overnight. Review of Systems:  Symptom Y/N Comments  Symptom Y/N Comments   Fever/Chills    Chest Pain     Poor Appetite    Edema     Cough    Abdominal Pain     Sputum    Joint Pain     SOB/BROTHERS    Pruritis/Rash     Nausea/vomit    Tolerating PT/OT     Diarrhea    Tolerating Diet     Constipation    Other       Could NOT obtain due to:      Objective:     VITALS:   Last 24hrs VS reviewed since prior progress note. Most recent are:  Patient Vitals for the past 24 hrs:   Temp Pulse Resp BP SpO2   08/27/22 0823 97.8 °F (36.6 °C) 86 17 (!) 138/59 98 %   08/27/22 0325 98.7 °F (37.1 °C) 80 17 (!) 122/58 98 %   08/26/22 2000 98 °F (36.7 °C) 84 17 (!) 150/59 98 %   08/26/22 1512 98.8 °F (37.1 °C) 89 -- (!) 153/71 96 %         Intake/Output Summary (Last 24 hours) at 8/27/2022 1036  Last data filed at 8/27/2022 0331  Gross per 24 hour   Intake 40 ml   Output 700 ml   Net -660 ml          I had a face to face encounter and independently examined this patient on 8/27/2022, as outlined below:  PHYSICAL EXAM:  General: WD, WN. Alert, cooperative, no acute distress    EENT:  EOMI. Anicteric sclerae. MMM  Resp:  CTA bilaterally, no wheezing or rales. No accessory muscle use  CV:  Regular  rhythm,  L arm edema  GI:  Soft, Non distended, Non tender. +Bowel sounds  Neurologic:  Alert and oriented X 3, normal speech  Psych:   Good insight. Not anxious nor agitated  Skin:  Skin tears and wound son b/l arms. Erythema and swelling on L arm. Reviewed most current lab test results and cultures  YES  Reviewed most current radiology test results   YES  Review and summation of old records today    NO  Reviewed patient's current orders and MAR    YES  PMH/SH reviewed - no change compared to H&P  ________________________________________________________________________  Care Plan discussed with:    Comments   Patient x    Family  x    RN x    Care Manager     Consultant                        Multidiciplinary team rounds were held today with , nursing, pharmacist and clinical coordinator. Patient's plan of care was discussed; medications were reviewed and discharge planning was addressed. ________________________________________________________________________  Total NON critical care TIME:  35 Minutes    Total CRITICAL CARE TIME Spent:   Minutes non procedure based      Comments   >50% of visit spent in counseling and coordination of care     ________________________________________________________________________  Hank Flynn MD     Procedures: see electronic medical records for all procedures/Xrays and details which were not copied into this note but were reviewed prior to creation of Plan. LABS:  I reviewed today's most current labs and imaging studies. Pertinent labs include:  Recent Labs     08/26/22  0232   WBC 10.5   HGB 7.6*   HCT 23.4*          Recent Labs     08/26/22  0232 08/25/22  0020   * 131*   K 3.6 3.7    101   CO2 22 25   GLU 99 108*   BUN 17 18   CREA 0.74 0.69*   CA 8.0* 8.1*         Signed:  Hank Flynn MD

## 2022-08-27 NOTE — TELEPHONE ENCOUNTER
Please add patient to ID schedule as follows  9/20 at 2 PM-in person or virtual.  Patient notification not required.

## 2022-08-27 NOTE — PROGRESS NOTES
Problem: Patient Education: Go to Patient Education Activity  Goal: Patient/Family Education  Outcome: Progressing Towards Goal     Problem: Patient Education: Go to Patient Education Activity  Goal: Patient/Family Education  Outcome: Progressing Towards Goal     Problem: Falls - Risk of  Goal: *Absence of Falls  Description: Document Emeka Melany Fall Risk and appropriate interventions in the flowsheet. Outcome: Progressing Towards Goal  Note: Fall Risk Interventions:  Mobility Interventions: Communicate number of staff needed for ambulation/transfer         Medication Interventions: Patient to call before getting OOB, Teach patient to arise slowly    Elimination Interventions: Call light in reach              Problem: Patient Education: Go to Patient Education Activity  Goal: Patient/Family Education  Outcome: Progressing Towards Goal     Problem: Pressure Injury - Risk of  Goal: *Prevention of pressure injury  Description: Document Chavez Scale and appropriate interventions in the flowsheet.   Outcome: Progressing Towards Goal  Note: Pressure Injury Interventions:  Sensory Interventions: Keep linens dry and wrinkle-free, Maintain/enhance activity level, Minimize linen layers    Moisture Interventions: Minimize layers    Activity Interventions: Increase time out of bed    Mobility Interventions: HOB 30 degrees or less    Nutrition Interventions: Document food/fluid/supplement intake    Friction and Shear Interventions: HOB 30 degrees or less, Minimize layers                Problem: Patient Education: Go to Patient Education Activity  Goal: Patient/Family Education  Outcome: Progressing Towards Goal

## 2022-08-27 NOTE — PROGRESS NOTES
PICC (Peripherally Inserted Central Catheter) line insertion  procedure note :     Procedure explained to patient along with risks and benefits  and patient agreed to proceed. Informed  written consent obtained from  patient. Patient teaching completed. Timeout completed. Pre-procedure assessment done. Maximum sterile barrier precautions observed throughout procedure. Lidocaine 1%  2    ml sq given prior to cannulation. Cannulated cephalic   vein using ultrasound guidance and modified seldinger technique. Inserted 4  Yoruba single  lumen PICC to right  upper arm using [employer] Tip Location System and  Syncplicitynera 1898. Pt has    sinus   rhythm. PICC tip location was confirmed by 3 bop.fm   tip positioning system, indicating tall P wave and no negative deflection before P wave which would indicate that the PICC tip is properly placed in the distal SVC or at the [place]. PICC tip location was  confirmed by 2 PICC nurses and printout placed on patient's chart. Blood return verified and flushed with 20 ml normal saline in each port. Sterile dressing applied with biopatch, statLock and occlusive dressing as per protocol. Curos caps applied to each port. Patient tolerated procedure well with minimal blood loss ( less than 5 ml.)  PICC procedure performed by  :  Lesia MADRID RN, PICC Nurse. Vascular Access Team.   Assisted by :   Rao Moura RN  [address] [place] [address] Dr Nurse, Vascular Access Team.  Reason for access :   Long term antibiotics therapy. Complications related to insertion  : observed pt developed small bruise right above the insertion site, skin intact, forearm is edematous up to above the elbow area, but below mid upper arm. X-Ray : not applicable  Notified primary nurse  Jimbo Mauro RN  that  PICC line can be used. Trimmed Length :  38   cm   External Length :   0 cm .    PICC line site arm circumference:    32    cm   PICC catheter occupies   10   % of vein  Type of PICC: Bard Solo Power PICC    Ref # :    [id number]     Lot # :  [id number]  Expiration Date :  2023/09/30    Emy MADRID RN, PICC Nurse, Vascular Access Team.

## 2022-08-28 PROCEDURE — 74011250637 HC RX REV CODE- 250/637: Performed by: NURSE PRACTITIONER

## 2022-08-28 PROCEDURE — 74011250637 HC RX REV CODE- 250/637: Performed by: INTERNAL MEDICINE

## 2022-08-28 PROCEDURE — 74011250636 HC RX REV CODE- 250/636: Performed by: INTERNAL MEDICINE

## 2022-08-28 PROCEDURE — 65270000029 HC RM PRIVATE

## 2022-08-28 PROCEDURE — 74011000250 HC RX REV CODE- 250: Performed by: INTERNAL MEDICINE

## 2022-08-28 RX ORDER — LANOLIN ALCOHOL/MO/W.PET/CERES
3 CREAM (GRAM) TOPICAL
Status: DISCONTINUED | OUTPATIENT
Start: 2022-08-28 | End: 2022-08-29 | Stop reason: HOSPADM

## 2022-08-28 RX ADMIN — GABAPENTIN 100 MG: 100 CAPSULE ORAL at 17:35

## 2022-08-28 RX ADMIN — AMLODIPINE BESYLATE 5 MG: 5 TABLET ORAL at 09:21

## 2022-08-28 RX ADMIN — CEFAZOLIN 2 G: 1 INJECTION, POWDER, FOR SOLUTION INTRAMUSCULAR; INTRAVENOUS at 09:20

## 2022-08-28 RX ADMIN — CEFAZOLIN 2 G: 1 INJECTION, POWDER, FOR SOLUTION INTRAMUSCULAR; INTRAVENOUS at 17:35

## 2022-08-28 RX ADMIN — ACETAMINOPHEN 650 MG: 325 TABLET ORAL at 21:23

## 2022-08-28 RX ADMIN — ATORVASTATIN CALCIUM 10 MG: 10 TABLET, FILM COATED ORAL at 09:20

## 2022-08-28 RX ADMIN — SODIUM CHLORIDE, PRESERVATIVE FREE 10 ML: 5 INJECTION INTRAVENOUS at 05:24

## 2022-08-28 RX ADMIN — GABAPENTIN 100 MG: 100 CAPSULE ORAL at 09:20

## 2022-08-28 RX ADMIN — CEFAZOLIN 2 G: 1 INJECTION, POWDER, FOR SOLUTION INTRAMUSCULAR; INTRAVENOUS at 02:03

## 2022-08-28 RX ADMIN — GABAPENTIN 100 MG: 100 CAPSULE ORAL at 21:24

## 2022-08-28 RX ADMIN — SODIUM CHLORIDE, PRESERVATIVE FREE 10 ML: 5 INJECTION INTRAVENOUS at 21:24

## 2022-08-28 RX ADMIN — MELATONIN 3 MG: at 21:24

## 2022-08-28 RX ADMIN — SODIUM CHLORIDE, PRESERVATIVE FREE 10 ML: 5 INJECTION INTRAVENOUS at 00:20

## 2022-08-28 RX ADMIN — ACETAMINOPHEN 650 MG: 325 TABLET ORAL at 17:35

## 2022-08-28 RX ADMIN — SODIUM CHLORIDE, PRESERVATIVE FREE 10 ML: 5 INJECTION INTRAVENOUS at 13:25

## 2022-08-28 RX ADMIN — CETIRIZINE HYDROCHLORIDE 10 MG: 10 TABLET, FILM COATED ORAL at 09:20

## 2022-08-28 RX ADMIN — ENOXAPARIN SODIUM 40 MG: 100 INJECTION SUBCUTANEOUS at 09:20

## 2022-08-28 NOTE — PROGRESS NOTES
Problem: Patient Education: Go to Patient Education Activity  Goal: Patient/Family Education  Outcome: Progressing Towards Goal     Problem: Patient Education: Go to Patient Education Activity  Goal: Patient/Family Education  Outcome: Progressing Towards Goal     Problem: Falls - Risk of  Goal: *Absence of Falls  Description: Document Edmar Taylor Fall Risk and appropriate interventions in the flowsheet. Outcome: Progressing Towards Goal  Note: Fall Risk Interventions:  Mobility Interventions: Communicate number of staff needed for ambulation/transfer         Medication Interventions: Patient to call before getting OOB, Teach patient to arise slowly    Elimination Interventions: Call light in reach              Problem: Patient Education: Go to Patient Education Activity  Goal: Patient/Family Education  Outcome: Progressing Towards Goal     Problem: Pressure Injury - Risk of  Goal: *Prevention of pressure injury  Description: Document Chavez Scale and appropriate interventions in the flowsheet.   Outcome: Progressing Towards Goal  Note: Pressure Injury Interventions:  Sensory Interventions: Assess changes in LOC    Moisture Interventions: Absorbent underpads    Activity Interventions: Increase time out of bed    Mobility Interventions: HOB 30 degrees or less    Nutrition Interventions: Document food/fluid/supplement intake    Friction and Shear Interventions: HOB 30 degrees or less                Problem: Patient Education: Go to Patient Education Activity  Goal: Patient/Family Education  Outcome: Progressing Towards Goal

## 2022-08-28 NOTE — PROGRESS NOTES
End of Shift Note    Bedside shift change report given to Mariann(oncoming nurse) by Verito Diez RN (offgoing nurse). Report included the following information SBAR, Kardex, Procedure Summary, Intake/Output, MAR, and Recent Results    Shift worked:  7 pm- 7 am     Shift summary and any significant changes:     Patient ambulated in suarez with walker and stand by from family. Concerns for physician to address:  No concerns     Zone phone for oncoming shift:   [phone]       Activity:  Activity Level: Up with Assistance  Number times ambulated in hallways past shift: 1  Number of times OOB to chair past shift: 0    Cardiac:   Cardiac Monitoring: No           Access:  Current line(s): PICC     Genitourinary:   Urinary status: voiding    Respiratory:   O2 Device: None (Room air)  Chronic home O2 use?: NO  Incentive spirometer at bedside: NO       GI:  Last Bowel Movement Date: 08/24/22  Current diet:  ADULT DIET Regular; 1200 ml  DIET NPO Sips of Water with Meds  Passing flatus: YES  Tolerating current diet: YES       Pain Management:   Patient states pain is manageable on current regimen: YES    Skin:  Chavez Score: 20  Interventions: float heels and increase time out of bed    Patient Safety:  Fall Score:  Total Score: 1  Interventions: gripper socks and pt to call before getting OOB  High Fall Risk: Yes    Length of Stay:  Expected LOS: 3d 4h  Actual LOS: 6      Verito Diez RN

## 2022-08-28 NOTE — PROGRESS NOTES
End of Shift Note    Bedside shift change report given to Mela Merritt RN (oncoming nurse) by Charmaine Bundy LPN (offgoing nurse). Report included the following information SBAR, Kardex, and MAR    Shift worked:  7a-7p     Shift summary and any significant changes:    PICC line placed. Otherwise uneventful shift for patient. Concerns for physician to address: none     Zone phone for oncoming shift:  [phone]       Activity:  Activity Level: Up with Assistance  Number times ambulated in hallways past shift: 2  Number of times OOB to chair past shift: 0    Cardiac:   Cardiac Monitoring: No           Access:  Current line(s): PICC     Genitourinary:   Urinary status: voiding    Respiratory:   O2 Device: None (Room air)  Chronic home O2 use?: NO  Incentive spirometer at bedside: NO       GI:  Last Bowel Movement Date: 08/24/22  Current diet:  ADULT DIET Regular; 1200 ml  DIET NPO Sips of Water with Meds  Passing flatus: YES  Tolerating current diet: YES       Pain Management:   Patient states pain is manageable on current regimen: YES    Skin:  Chavez Score: 21  Interventions: increase time out of bed    Patient Safety:  Fall Score:  Total Score: 2  Interventions: gripper socks  High Fall Risk: Yes    Length of Stay:  Expected LOS: 3d 4h  Actual LOS: 5      Charmaine Bundy LPN

## 2022-08-28 NOTE — PROGRESS NOTES
Problem: Pressure Injury - Risk of  Goal: *Prevention of pressure injury  Description: Document Chavez Scale and appropriate interventions in the flowsheet.   Outcome: Progressing Towards Goal  Note: Pressure Injury Interventions:  Sensory Interventions: Keep linens dry and wrinkle-free, Maintain/enhance activity level, Minimize linen layers    Moisture Interventions: Minimize layers, Limit adult briefs    Activity Interventions: Increase time out of bed    Mobility Interventions: Float heels    Nutrition Interventions: Document food/fluid/supplement intake    Friction and Shear Interventions: Minimize layers

## 2022-08-28 NOTE — PROGRESS NOTES
Hospitalist Progress Note    NAME: Enrico Kehr. :  1945   MRN:  [id number]       Assessment / Plan:  Musculoskeletal chest pain, POA  Recent mechanical fall, POA  Acute fracture of the left seventh seventh costochondral junction, POA  - Presented with musculoskeletal chest pain in nature  - CTA with acute fracture of the left seventh rib costochondral junction (he fell on  and was admitted to [facility], discharged to rehab on )  - EKG showed SR with PACs  - check orthostatic vitals. Monitor on telemetry  -check UA to r/o infection (although pt is already on abx)  -add prn ketorolac  - PT OT  - Fall precautions      Bacteremia:   Blood culture grew staph aureus in 2 bottles  Patient antibiotic switched to cefazolin   ID consulted recommended WILFREDO as echo did not show clearly if there is vegetation or clot  PICC line ordered  Cefazolin till   NPO midnight WILFREDO tomorrow    Echo: EF 40 to 45% there were some difficulty technically no specific commands about about wall motion abnormalities thrombus or vegetation  ID consulted      Hyponatremia  Likely SIADH from pain  Fluid restrictions  Sodium improved to 132   Follow bmp    Left basilic vein thrombosis with phlebitis vs cellulitis, POA  - Reported fever episode at SNF, none here thus far. Wbc 13K on admission  -check UA, procal  - Duplex ultrasound preliminary results with thrombus within the left basilic upper arm veins. No deep vein thrombosis reported  WBC is trending down blood blood culture grew staph repeat blood culture sent, continue with IV antibiotics ceftriaxone and Vanco  - Supportive care with left arm elevation and warm/cold compression, prn ketorolac    Coronary artery disease  Hypertension  -cont' amlodipine, incr dose. Lower coreg (low HR at [facility]). Adjust as needed.   Will need to simplify his meds at discharge  -pharmacy to help with med recs    Hyperlipidemia  - Continue home statins     Wounds on L/R arms from fall  Wound care     Constipation  No bm for 5 days, requesting suppository  Will add lactulose prn    Chronic atopic dermatitis   Follows with derm at [facility]    BARBARA: 8/26 pending clinical improvement    Updated pt's 2 sisters at bedside       Code Status: Full code  Surrogate Decision Maker: pt's sisters  DVT Prophylaxis: Lovenox  GI Prophylaxis: not indicated     Baseline: Active, lives alone, came from rehab facility     Subjective:     Chief Complaint / Reason for Physician Visit     discussed with RN events overnight. Review of Systems:  Symptom Y/N Comments  Symptom Y/N Comments   Fever/Chills    Chest Pain     Poor Appetite    Edema     Cough    Abdominal Pain     Sputum    Joint Pain     SOB/BROTHERS    Pruritis/Rash     Nausea/vomit    Tolerating PT/OT     Diarrhea    Tolerating Diet     Constipation    Other       Could NOT obtain due to:      Objective:     VITALS:   Last 24hrs VS reviewed since prior progress note. Most recent are:  Patient Vitals for the past 24 hrs:   Temp Pulse Resp BP SpO2   08/28/22 0916 97.8 °F (36.6 °C) 89 18 (!) 151/71 98 %   08/28/22 0326 97.6 °F (36.4 °C) 80 18 (!) 145/55 96 %   08/27/22 2042 98.4 °F (36.9 °C) 89 18 (!) 147/56 99 %       Intake/Output Summary (Last 24 hours) at 8/28/2022 1459  Last data filed at 8/27/2022 2223  Gross per 24 hour   Intake --   Output 225 ml   Net -225 ml        I had a face to face encounter and independently examined this patient on 8/28/2022, as outlined below:  PHYSICAL EXAM:  General: WD, WN. Alert, cooperative, no acute distress    EENT:  EOMI. Anicteric sclerae. MMM  Resp:  CTA bilaterally, no wheezing or rales. No accessory muscle use  CV:  Regular  rhythm,  L arm edema  GI:  Soft, Non distended, Non tender. +Bowel sounds  Neurologic:  Alert and oriented X 3, normal speech  Psych:   Good insight. Not anxious nor agitated  Skin:  Skin tears and wound son b/l arms. Erythema and swelling on L arm.     Reviewed most current lab test results and cultures  YES  Reviewed most current radiology test results   YES  Review and summation of old records today    NO  Reviewed patient's current orders and MAR    YES  PMH/SH reviewed - no change compared to H&P  ________________________________________________________________________  Care Plan discussed with:    Comments   Patient x    Family  x    RN x    Care Manager     Consultant                        Multidiciplinary team rounds were held today with , nursing, pharmacist and clinical coordinator. Patient's plan of care was discussed; medications were reviewed and discharge planning was addressed. ________________________________________________________________________  Total NON critical care TIME:  35 Minutes    Total CRITICAL CARE TIME Spent:   Minutes non procedure based      Comments   >50% of visit spent in counseling and coordination of care     ________________________________________________________________________  Herlinda Person MD     Procedures: see electronic medical records for all procedures/Xrays and details which were not copied into this note but were reviewed prior to creation of Plan. LABS:  I reviewed today's most current labs and imaging studies. Pertinent labs include:  Recent Labs     08/26/22 0232   WBC 10.5   HGB 7.6*   HCT 23.4*        Recent Labs     08/26/22 0232   *   K 3.6      CO2 22   GLU 99   BUN 17   CREA 0.74   CA 8.0*       Signed:  Herlinda Person MD

## 2022-08-28 NOTE — PROGRESS NOTES
Progress Note      8/28/2022 10:23 AM  NAME: Rasta Robles. MRN:  [id number]   Admit Diagnosis: Rib fracture [S22.39XA]                Assessment:       Mechanical Fall  Left arm cellulitis  MSSA bacteremia     - Cath 2003 mild CAD, stress in 7/2021 no ischemia  - non-ischemic Cardiomyopathy Echo 7/2021 EF 45%, mild AI, mild MR, mod pHTN  - LBBB, Holter 5/2021 no sig arrhythmia  - Syncope 2020, Bradycardia at [facility] beta blocker stopped  - HTN  - Dyslipidemia  - Low sodium  - Mild anemia, low plt 123  - IBS  - Rampart  - Prostate CA  - Quit EtOH 4/2022  - No tobacco  Single, no kids, retired  for [employer], uses a cane                     Plan:        - No chest pain  - Euvolemic  - Sinus     Blood cx 2/4 positive on 8/22, repeat 8/24 NGTD  WILFREDO Monday am, all r/b/a reviewed     - Coreg stopped at [facility] with amlodipine would not increase past 5mg  - Add diovan if needed  - Cont statin             [x]        High complexity decision making was performed          We discussed the expected course, resolution and complications of the diagnoses in detail. Medication risk, benefits, costs, interactions, and alternatives were discussed as indicated. I advised him to contact the office if his condition worsens, changes or fails to improve as anticipated. Patient expressed understanding with the diagnoses  and plan. Subjective:     Rasta Robles. denies chest pain, dyspnea. Discussed with RN events overnight. Review of Systems:    Symptom Y/N Comments  Symptom Y/N Comments   Fever/Chills N   Chest Pain N    Poor Appetite N   Edema N    Cough N   Abdominal Pain N    Sputum N   Joint Pain N    SOB/BROTHERS N   Pruritis/Rash N    Nausea/vomit N   Tolerating PT/OT Y    Diarrhea N   Tolerating Diet Y    Constipation N   Other       Could NOT obtain due to:      Objective:      Physical Exam:    Last 24hrs VS reviewed since prior progress note.  Most recent are:    Visit Vitals  BP (!) 151/71 (BP 1 Location: Right lower arm, BP Patient Position: At rest)   Pulse 89   Temp 97.8 °F (36.6 °C)   Resp 18   Ht 5' 11\" (1.803 m)   Wt 77.1 kg (169 lb 15.6 oz)   SpO2 98%   BMI 23.71 kg/m²       Intake/Output Summary (Last 24 hours) at 8/28/2022 1023  Last data filed at 8/27/2022 2223  Gross per 24 hour   Intake --   Output 225 ml   Net -225 ml        General Appearance: Well developed, well nourished, alert & oriented x 3,    no acute distress. Ears/Nose/Mouth/Throat: Hearing grossly normal.  Neck: Supple. Chest: Lungs clear to auscultation bilaterally. Cardiovascular: Regular rate and rhythm, S1S2 normal, no murmur. Abdomen: Soft, non-tender, bowel sounds are active. Extremities: No edema bilaterally. Skin: Warm and dry. PMH/ reviewed - no change compared to H&P    Data Review    Telemetry: normal sinus rhythm     Lab Data Personally Reviewed:    Recent Labs     08/26/22 0232   WBC 10.5   HGB 7.6*   HCT 23.4*        No results for input(s): INR, PTP, APTT, INREXT in the last 72 hours. Recent Labs     08/26/22 0232   *   K 3.6      CO2 22   BUN 17   CREA 0.74   GLU 99   CA 8.0*     No results for input(s): CPK, CKNDX, TROIQ in the last 72 hours. No lab exists for component: CPKMB  No results found for: CHOL, CHOLX, CHLST, CHOLV, HDL, HDLP, LDL, LDLC, DLDLP, TGLX, TRIGL, TRIGP, CHHD, CHHDX    No results for input(s): AP, TBIL, TP, ALB, GLOB, GGT, AML, LPSE in the last 72 hours. No lab exists for component: SGOT, GPT, AMYP, HLPSE  No results for input(s): PH, PCO2, PO2 in the last 72 hours.     Medications Personally Reviewed:    Current Facility-Administered Medications   Medication Dose Route Frequency    heparin (porcine) pf 300 Units  300 Units InterCATHeter PRN    amLODIPine (NORVASC) tablet 5 mg  5 mg Oral DAILY    ceFAZolin (ANCEF) 2 g in sterile water (preservative free) 20 mL IV syringe  2 g IntraVENous Q8H    morphine injection 0.5 mg  0.5 mg IntraVENous Q4H PRN ketorolac (TORADOL) injection 15 mg  15 mg IntraVENous Q6H PRN    oxyCODONE IR (ROXICODONE) tablet 2.5 mg  2.5 mg Oral Q4H PRN    bisacodyL (DULCOLAX) suppository 10 mg  10 mg Rectal DAILY PRN    lactulose (CHRONULAC) 10 gram/15 mL solution 15 mL  10 g Oral DAILY PRN    . PHARMACY TO SUBSTITUTE PER PROTOCOL (Reordered from: fluorouraciL (EFUDEX) 5 % chemo cream)    Per Protocol    gabapentin (NEURONTIN) capsule 100 mg  100 mg Oral TID    traZODone (DESYREL) tablet 50 mg  50 mg Oral QHS    cetirizine (ZYRTEC) tablet 10 mg  10 mg Oral DAILY    atorvastatin (LIPITOR) tablet 10 mg  10 mg Oral DAILY    sodium chloride (NS) flush 5-40 mL  5-40 mL IntraVENous Q8H    sodium chloride (NS) flush 5-40 mL  5-40 mL IntraVENous PRN    acetaminophen (TYLENOL) tablet 650 mg  650 mg Oral Q6H PRN    Or    acetaminophen (TYLENOL) suppository 650 mg  650 mg Rectal Q6H PRN    polyethylene glycol (MIRALAX) packet 17 g  17 g Oral DAILY PRN    promethazine (PHENERGAN) tablet 12.5 mg  12.5 mg Oral Q6H PRN    Or    ondansetron (ZOFRAN) injection 4 mg  4 mg IntraVENous Q6H PRN    enoxaparin (LOVENOX) injection 40 mg  40 mg SubCUTAneous DAILY         Keyanna Velasquez MD

## 2022-08-28 NOTE — PROGRESS NOTES
End of Shift Note    Bedside shift change report given to Lisandra Lewis RN (oncoming nurse) by Calvin Smith LPN (offgoing nurse). Report included the following information SBAR, Kardex, and MAR    Shift worked:  7a-7p     Shift summary and any significant changes:    Patient medicated for pain x1. Rested well during shift. Ambulated in hallways during shift. Concerns for physician to address: none     Zone phone for oncoming shift:  [phone]       Activity:  Activity Level: Up with Assistance  Number times ambulated in hallways past shift: 2  Number of times OOB to chair past shift: 1    Cardiac:   Cardiac Monitoring: No           Access:  Current line(s): PIV     Genitourinary:   Urinary status: voiding    Respiratory:   O2 Device: None (Room air)  Chronic home O2 use?: NO  Incentive spirometer at bedside: NO       GI:  Last Bowel Movement Date: 08/24/22  Current diet:  ADULT DIET Regular; 1200 ml  DIET NPO Sips of Water with Meds  Passing flatus: YES  Tolerating current diet: YES       Pain Management:   Patient states pain is manageable on current regimen: YES    Skin:  Chavez Score: 20  Interventions: increase time out of bed and nutritional support     Patient Safety:  Fall Score:  Total Score: 2  Interventions: gripper socks  High Fall Risk: Yes    Length of Stay:  Expected LOS: 3d 4h  Actual LOS: 6      Calvin Smith LPN

## 2022-08-29 ENCOUNTER — APPOINTMENT (OUTPATIENT)
Dept: NON INVASIVE DIAGNOSTICS | Age: 77
DRG: 603 | End: 2022-08-29
Attending: INTERNAL MEDICINE
Payer: MEDICARE

## 2022-08-29 VITALS
BODY MASS INDEX: 23.8 KG/M2 | RESPIRATION RATE: 18 BRPM | HEART RATE: 105 BPM | WEIGHT: 169.97 LBS | TEMPERATURE: 97.6 F | DIASTOLIC BLOOD PRESSURE: 65 MMHG | OXYGEN SATURATION: 99 % | HEIGHT: 71 IN | SYSTOLIC BLOOD PRESSURE: 132 MMHG

## 2022-08-29 LAB
ANION GAP SERPL CALC-SCNC: 4 MMOL/L (ref 5–15)
BUN SERPL-MCNC: 12 MG/DL (ref 6–20)
BUN/CREAT SERPL: 17 (ref 12–20)
CALCIUM SERPL-MCNC: 8.4 MG/DL (ref 8.5–10.1)
CHLORIDE SERPL-SCNC: 102 MMOL/L (ref 97–108)
CO2 SERPL-SCNC: 27 MMOL/L (ref 21–32)
CREAT SERPL-MCNC: 0.69 MG/DL (ref 0.7–1.3)
ERYTHROCYTE [DISTWIDTH] IN BLOOD BY AUTOMATED COUNT: 14.8 % (ref 11.5–14.5)
GLUCOSE SERPL-MCNC: 95 MG/DL (ref 65–100)
HCT VFR BLD AUTO: 24.5 % (ref 36.6–50.3)
HGB BLD-MCNC: 8.1 G/DL (ref 12.1–17)
MCH RBC QN AUTO: 30.9 PG (ref 26–34)
MCHC RBC AUTO-ENTMCNC: 33.1 G/DL (ref 30–36.5)
MCV RBC AUTO: 93.5 FL (ref 80–99)
NRBC # BLD: 0 K/UL (ref 0–0.01)
NRBC BLD-RTO: 0 PER 100 WBC
PLATELET # BLD AUTO: 270 K/UL (ref 150–400)
PMV BLD AUTO: 9.2 FL (ref 8.9–12.9)
POTASSIUM SERPL-SCNC: 4.1 MMOL/L (ref 3.5–5.1)
RBC # BLD AUTO: 2.62 M/UL (ref 4.1–5.7)
SODIUM SERPL-SCNC: 133 MMOL/L (ref 136–145)
WBC # BLD AUTO: 11.5 K/UL (ref 4.1–11.1)

## 2022-08-29 PROCEDURE — 74011000250 HC RX REV CODE- 250: Performed by: INTERNAL MEDICINE

## 2022-08-29 PROCEDURE — 74011250636 HC RX REV CODE- 250/636: Performed by: INTERNAL MEDICINE

## 2022-08-29 PROCEDURE — 85027 COMPLETE CBC AUTOMATED: CPT

## 2022-08-29 PROCEDURE — 80048 BASIC METABOLIC PNL TOTAL CA: CPT

## 2022-08-29 PROCEDURE — 99152 MOD SED SAME PHYS/QHP 5/>YRS: CPT | Performed by: INTERNAL MEDICINE

## 2022-08-29 PROCEDURE — 74011250637 HC RX REV CODE- 250/637: Performed by: INTERNAL MEDICINE

## 2022-08-29 PROCEDURE — 36415 COLL VENOUS BLD VENIPUNCTURE: CPT

## 2022-08-29 PROCEDURE — 92960 CARDIOVERSION ELECTRIC EXT: CPT | Performed by: INTERNAL MEDICINE

## 2022-08-29 PROCEDURE — 5A2204Z RESTORATION OF CARDIAC RHYTHM, SINGLE: ICD-10-PCS | Performed by: INTERNAL MEDICINE

## 2022-08-29 PROCEDURE — 96374 THER/PROPH/DIAG INJ IV PUSH: CPT

## 2022-08-29 RX ORDER — AMLODIPINE BESYLATE 5 MG/1
5 TABLET ORAL DAILY
Qty: 30 TABLET | Refills: 0 | Status: SHIPPED | OUTPATIENT
Start: 2022-08-30 | End: 2022-09-20

## 2022-08-29 RX ORDER — FENTANYL CITRATE 50 UG/ML
INJECTION, SOLUTION INTRAMUSCULAR; INTRAVENOUS AS NEEDED
Status: DISCONTINUED | OUTPATIENT
Start: 2022-08-29 | End: 2022-08-29 | Stop reason: HOSPADM

## 2022-08-29 RX ORDER — LIDOCAINE HYDROCHLORIDE 20 MG/ML
SOLUTION OROPHARYNGEAL AS NEEDED
Status: DISCONTINUED | OUTPATIENT
Start: 2022-08-29 | End: 2022-08-29 | Stop reason: HOSPADM

## 2022-08-29 RX ORDER — MIDAZOLAM HYDROCHLORIDE 1 MG/ML
INJECTION, SOLUTION INTRAMUSCULAR; INTRAVENOUS AS NEEDED
Status: DISCONTINUED | OUTPATIENT
Start: 2022-08-29 | End: 2022-08-29 | Stop reason: HOSPADM

## 2022-08-29 RX ORDER — ATORVASTATIN CALCIUM 10 MG/1
10 TABLET, FILM COATED ORAL DAILY
Qty: 30 TABLET | Refills: 0 | Status: SHIPPED | OUTPATIENT
Start: 2022-08-30 | End: 2022-09-29

## 2022-08-29 RX ADMIN — CEFAZOLIN 2 G: 1 INJECTION, POWDER, FOR SOLUTION INTRAMUSCULAR; INTRAVENOUS at 12:57

## 2022-08-29 RX ADMIN — CEFAZOLIN 2 G: 1 INJECTION, POWDER, FOR SOLUTION INTRAMUSCULAR; INTRAVENOUS at 00:55

## 2022-08-29 RX ADMIN — ATORVASTATIN CALCIUM 10 MG: 10 TABLET, FILM COATED ORAL at 12:57

## 2022-08-29 RX ADMIN — CETIRIZINE HYDROCHLORIDE 10 MG: 10 TABLET, FILM COATED ORAL at 12:56

## 2022-08-29 RX ADMIN — AMLODIPINE BESYLATE 5 MG: 5 TABLET ORAL at 12:56

## 2022-08-29 RX ADMIN — ENOXAPARIN SODIUM 40 MG: 100 INJECTION SUBCUTANEOUS at 12:57

## 2022-08-29 RX ADMIN — SODIUM CHLORIDE, PRESERVATIVE FREE 10 ML: 5 INJECTION INTRAVENOUS at 06:23

## 2022-08-29 RX ADMIN — GABAPENTIN 100 MG: 100 CAPSULE ORAL at 12:56

## 2022-08-29 NOTE — PROGRESS NOTES
Physician Progress Note      Josselin Cole  [facility] #:                  [id number]  :                       1945  ADMIT DATE:       2022 3:28 PM  [address] [place] DATE:  RESPONDING  PROVIDER #:        Ning Felton MD        QUERY TEXT:    Type of Anemia: Please provide further specificity, if known. Clinical indicators include: mild anemia, cancer, bleeding, hematuria, chemo, hemoptysis, hg, hgb, hct  Options provided:  -- Anemia due to acute blood loss  -- Anemia due to chronic blood loss  -- Anemia due to iron deficiency  -- Anemia due to postoperative blood loss  -- Anemia due to chronic disease  -- Other - I will add my own diagnosis  -- Disagree - Not applicable / Not valid  -- Disagree - Clinically Unable to determine / Unknown        PROVIDER RESPONSE TEXT:    The patient has anemia due to chronic disease.       Electronically signed by:  Mariya Mejia MD 2022 12:01 PM

## 2022-08-29 NOTE — PROGRESS NOTES
Received notification from bedside RN about patient with regards to: difficulty with sleep, does not like the side effect of Trazodone and requesting Melatonin instead  VS: /53, HR 92, RR 16, O2 sat 100% on RA    Intervention given: Melatonin PO q hs PRN ordered

## 2022-08-29 NOTE — PROGRESS NOTES
DISCHARGE NOTE FROM [facility] NURSE    Patient determined to be stable for discharge by attending provider. I have reviewed the discharge instructions and follow-up appointments with the patient and caregiver. They verbalized understanding and all questions were answered to their satisfaction. No complaints or further questions were expressed. Hard scripts for medications given to patient. Appropriate educational materials and medication side effect teaching were provided. PICC line care provided to patient by Home Health nurse. All personal items collected during admission were returned to the patient prior to discharge. Post-op patient: Yes- Patient given post-op discharge kit and instructed on use.        Glenny Castelan LPN

## 2022-08-29 NOTE — PROGRESS NOTES
Progress Note      8/29/2022 10:23 AM  NAME: Rasta Robles. MRN:  [id number]   Admit Diagnosis: Rib fracture [S22.39XA]                Assessment:       Mechanical Fall  Left arm cellulitis  MSSA bacteremia     - Cath 2003 mild CAD, stress in 7/2021 no ischemia  - non-ischemic Cardiomyopathy Echo 7/2021 EF 45%, mild AI, mild MR, mod pHTN. WILFREDO 8/2022 nl EF, mild calcification on mitral valve with mild MR. Trace AI  - LBBB, Holter 5/2021 no sig arrhythmia  - Syncope 2020, Bradycardia at [facility] beta blocker stopped  - HTN  - Dyslipidemia  - Low sodium  - Mild anemia, low plt 123  - IBS  - [place]  - Prostate CA  - Quit EtOH 4/2022  - No tobacco  Single, no kids, retired  for [email or web address], uses a cane                     Plan:        - No chest pain  - Euvolemic  - Sinus     Blood cx 2/4 positive on 8/22, repeat 8/24 NGTD    WILFREDO with mild calcification on mitral valve with mild mitral regurgitation. No signs for endocarditis. - Coreg stopped at [facility] with amlodipine would not increase past 5mg  - Add diovan if needed  - Cont statin             [x]        High complexity decision making was performed          We discussed the expected course, resolution and complications of the diagnoses in detail. Medication risk, benefits, costs, interactions, and alternatives were discussed as indicated. I advised him to contact the office if his condition worsens, changes or fails to improve as anticipated. Patient expressed understanding with the diagnoses  and plan. Subjective:     Rasta Robles. denies chest pain, dyspnea. Discussed with RN events overnight.      Review of Systems:    Symptom Y/N Comments  Symptom Y/N Comments   Fever/Chills N   Chest Pain N    Poor Appetite N   Edema N    Cough N   Abdominal Pain N    Sputum N   Joint Pain N    SOB/BROTHERS N   Pruritis/Rash N    Nausea/vomit N   Tolerating PT/OT Y    Diarrhea N   Tolerating Diet Y    Constipation N   Other       Could NOT obtain due to:      Objective:      Physical Exam:    Last 24hrs VS reviewed since prior progress note. Most recent are:    Visit Vitals  BP (!) 161/55   Pulse 87   Temp 98.1 °F (36.7 °C)   Resp 18   Ht 5' 11\" (1.803 m)   Wt 77.1 kg (169 lb 15.6 oz)   SpO2 96%   BMI 23.71 kg/m²       Intake/Output Summary (Last 24 hours) at 8/29/2022 1139  Last data filed at 8/29/2022 0250  Gross per 24 hour   Intake --   Output 200 ml   Net -200 ml          General Appearance: Well developed, well nourished, alert & oriented x 3,    no acute distress. Ears/Nose/Mouth/Throat: Hearing grossly normal.  Neck: Supple. Chest: Lungs clear to auscultation bilaterally. Cardiovascular: Regular rate and rhythm, S1S2 normal, no murmur. Abdomen: Soft, non-tender, bowel sounds are active. Extremities: No edema bilaterally. Skin: Warm and dry. PMH/ reviewed - no change compared to H&P    Data Review    Telemetry: normal sinus rhythm     Lab Data Personally Reviewed:    Recent Labs     08/29/22  0243   WBC 11.5*   HGB 8.1*   HCT 24.5*          No results for input(s): INR, PTP, APTT, INREXT, INREXT in the last 72 hours. Recent Labs     08/29/22  0243   *   K 4.1      CO2 27   BUN 12   CREA 0.69*   GLU 95   CA 8.4*       No results for input(s): CPK, CKNDX, TROIQ in the last 72 hours. No lab exists for component: CPKMB  No results found for: CHOL, CHOLX, CHLST, CHOLV, HDL, HDLP, LDL, LDLC, DLDLP, TGLX, TRIGL, TRIGP, CHHD, CHHDX    No results for input(s): AP, TBIL, TP, ALB, GLOB, GGT, AML, LPSE in the last 72 hours. No lab exists for component: SGOT, GPT, AMYP, HLPSE  No results for input(s): PH, PCO2, PO2 in the last 72 hours.     Medications Personally Reviewed:    Current Facility-Administered Medications   Medication Dose Route Frequency    lidocaine (XYLOCAINE) 2 % viscous solution    PRN    butamben-tetracaine-benzocaine (CETACAINE) 2 %-2 %-14 % (200 mg/sec) topical spray    PRN    fentaNYL citrate (PF) injection PRN    midazolam (VERSED) injection    PRN    melatonin tablet 3 mg  3 mg Oral QHS PRN    heparin (porcine) pf 300 Units  300 Units InterCATHeter PRN    amLODIPine (NORVASC) tablet 5 mg  5 mg Oral DAILY    ceFAZolin (ANCEF) 2 g in sterile water (preservative free) 20 mL IV syringe  2 g IntraVENous Q8H    morphine injection 0.5 mg  0.5 mg IntraVENous Q4H PRN    oxyCODONE IR (ROXICODONE) tablet 2.5 mg  2.5 mg Oral Q4H PRN    bisacodyL (DULCOLAX) suppository 10 mg  10 mg Rectal DAILY PRN    lactulose (CHRONULAC) 10 gram/15 mL solution 15 mL  10 g Oral DAILY PRN    . PHARMACY TO SUBSTITUTE PER PROTOCOL (Reordered from: fluorouraciL (EFUDEX) 5 % chemo cream)    Per Protocol    gabapentin (NEURONTIN) capsule 100 mg  100 mg Oral TID    traZODone (DESYREL) tablet 50 mg  50 mg Oral QHS    cetirizine (ZYRTEC) tablet 10 mg  10 mg Oral DAILY    atorvastatin (LIPITOR) tablet 10 mg  10 mg Oral DAILY    sodium chloride (NS) flush 5-40 mL  5-40 mL IntraVENous Q8H    sodium chloride (NS) flush 5-40 mL  5-40 mL IntraVENous PRN    acetaminophen (TYLENOL) tablet 650 mg  650 mg Oral Q6H PRN    Or    acetaminophen (TYLENOL) suppository 650 mg  650 mg Rectal Q6H PRN    polyethylene glycol (MIRALAX) packet 17 g  17 g Oral DAILY PRN    promethazine (PHENERGAN) tablet 12.5 mg  12.5 mg Oral Q6H PRN    Or    ondansetron (ZOFRAN) injection 4 mg  4 mg IntraVENous Q6H PRN    enoxaparin (LOVENOX) injection 40 mg  40 mg SubCUTAneous DAILY           Stefania Candelario MD

## 2022-08-29 NOTE — PROGRESS NOTES
Occupational Therapy    Attempted to see patient for OT session. In process of transferring ROXANA for WILFREDO. Will defer and continue to follow.      Barbara Zaman, ALINA, OTR/L

## 2022-08-29 NOTE — PROGRESS NOTES
Pt ready for discharge from a CM standpoint. Sister present at bedside and will transport patient home. Transition of Care Plan:    RUR:  13% low risk for readmission  Disposition:  Home with  (AT [address]), IV infusion ([facility] [address])  Follow up appointments: PCP (office will call patient with appointment)  Infectious Disease  DME needed:  IV infusion ([facility] [address]) ; rolling walker ([facility])  Transportation at Discharge:  Sister present in room and will transport  [address] or means to access home:   Pt has access     IM Medicare Letter:  2nd [facility] letter given to patient, signed and copy placed on medical record  Caregiver Contact: SisterPravin  Discharge Caregiver contacted prior to discharge? Sister, present in room  Care Conference needed?: n/a    Pt ready for discharge from a CM standpoint. Rolling walker delivered bedside, IV infusion will be with [facility].  Cost is $45 a day as patient does not have Medicare Part D coverage. Sister and patient are agreeable to cost for IV medications. Sister plans to be taught for IV antibiotics. No other CM needs identified at this time. Care Management Interventions  PCP Verified by CM: Yes (1 month ago)  Mode of Transport at Discharge:  Other (see comment) (Sister will transport)  Transition of Care Consult (CM Consult): Discharge Planning  Support Systems: Other Family Member(s)  Confirm Follow Up Transport: Family  Discharge Location  Patient Expects to be Discharged to[de-identified] Home with home health

## 2022-08-29 NOTE — PROGRESS NOTES
Problem: Falls - Risk of  Goal: *Absence of Falls  Description: Document [place] Flow Fall Risk and appropriate interventions in the flowsheet. Outcome: Progressing Towards Goal  Note: Fall Risk Interventions:  Mobility Interventions: Communicate number of staff needed for ambulation/transfer         Medication Interventions: Evaluate medications/consider consulting pharmacy, Patient to call before getting OOB, Teach patient to arise slowly    Elimination Interventions: Call light in reach, Urinal in reach              Problem: Pressure Injury - Risk of  Goal: *Prevention of pressure injury  Description: Document Chavez Scale and appropriate interventions in the flowsheet.   Outcome: Progressing Towards Goal  Note: Pressure Injury Interventions:  Sensory Interventions: Keep linens dry and wrinkle-free, Maintain/enhance activity level, Minimize linen layers    Moisture Interventions: Minimize layers    Activity Interventions: Increase time out of bed    Mobility Interventions: HOB 30 degrees or less    Nutrition Interventions: Document food/fluid/supplement intake    Friction and Shear Interventions: HOB 30 degrees or less, Minimize layers

## 2022-08-29 NOTE — DISCHARGE SUMMARY
Hospitalist Discharge Summary     Patient ID:  Tom Velásquez  [id number]  67 y.o.  1945 8/22/2022    PCP on record: Nico Cline MD    Admit date: 8/22/2022  Discharge date and time: 8/30/2022    DISCHARGE DIAGNOSIS:  Musculoskeletal chest pain, POA  Recent mechanical fall, POA  Acute fracture of the left seventh seventh costochondral junction, POA  Bacteremia  Hyponatremia resolved  Left basilic vein thrombosis with phlebitis versus cellulitis  CAD  Hypertension  Constipation  Wound on the lateral side of the right arm flexed chronic constipation  Chronic atopic dermatitis        CONSULTATIONS:  IP CONSULT TO INFECTIOUS DISEASES  IP CONSULT TO CARDIOLOGY    Excerpted HPI from H&P of Nelson Sellers MD:  The patient 67years old man with past medical history significant for coronary artery disease, hypertension, hyperlipidemia presented emergency department from rehab facility due to left-sided chest pain. Patient had recent admission to [facility] due to mechanical fall was discharged on August 19 to rehab facility. He describes his chest pain sharp, nonradiating, aggravated by physical movement, alleviated by rest.  He denies any shortness of breath, dizziness, abdominal pain, nausea, vomiting, diarrhea. Patient reported that his chest pain started when they were trying to work with him at the rehab facility. He also reported left arm swelling started few days ago. Patient does not smoke, drink alcohol or use illicit drugs. The emergency department, CTA with left seventh rib fracture. He was found to have leukocytosis for which he was given vancomycin for possible cellulitis on the left upper extremity. ______________________________________________________________________  DISCHARGE SUMMARY/HOSPITAL COURSE:  for full details see H&P, daily progress notes, labs, consult notes.          Patient has course of rib fracture with thrombophlebitis to the basilic vein and cellulitis complicated by bacteremia which she found to be MSSA PICC line ordered patient clinically improved WILFREDO did not show any vegetation discharged home with infusion therapy to continue antibiotic till 9/21  Echo showed ejection fraction 40 to 45%  Review rest of the chart for more details    _______________________________________________________________________  Patient seen and examined by me on discharge day. Pertinent Findings:  Gen:    Not in distress  Chest: Clear lungs  CVS:   Regular rhythm. No edema  Abd:  Soft, not distended, not tender  Neuro:  Alert,   _______________________________________________________________________  DISCHARGE MEDICATIONS:   Discharge Medication List as of 8/29/2022  4:03 PM        START taking these medications    Details   atorvastatin (LIPITOR) 10 mg tablet Take 1 Tablet by mouth daily for 30 days. , Print, Disp-30 Tablet, R-0      ceFAZolin 2 g IV syringe 2 g by IntraVENous route every eight (8) hours for 23 days. , No Print, Disp-70 Dose, R-0           CONTINUE these medications which have CHANGED    Details   amLODIPine (NORVASC) 5 mg tablet Take 1 Tablet by mouth daily for 30 days. , Print, Disp-30 Tablet, R-0           CONTINUE these medications which have NOT CHANGED    Details   cetirizine (ZYRTEC) 10 mg tablet Take 10 mg by mouth daily. , Historical Med      doxycycline (MONODOX) 100 mg capsule Take 100 mg by mouth two (2) times a day. Left arm cellulitis, Historical Med      fluorouraciL (EFUDEX) 5 % chemo cream Apply 1 Each to affected area two (2) times a day. Apply to right anterior shoulder x 6 weeks, Historical Med      gabapentin (NEURONTIN) 100 mg capsule Take 100 mg by mouth three (3) times daily. , Historical Med      mineral oil-hydrophil petrolat (AQUAPHOR) ointment Apply 1 Each to affected area as needed for Itching.  Apply to back, chest, arms prn itching, Historical Med      !! mupirocin (BACTROBAN) 2 % ointment Apply 1 g to affected area two (2) times a day. Apply to back, chest, arms, Historical Med      !! mupirocin (BACTROBAN) 2 % ointment Apply 1 g to affected area two (2) times a day. To right anterior should biopsy site, Historical Med      camphor-menthoL (SARNA) 0.5-0.5 % lotion Apply 1 Each to affected area as needed for Itching. Apply to chest, back, arms as needed itching, Historical Med      senna (SENOKOT) 8.6 mg tablet Take 1 Tablet by mouth nightly., Historical Med      traZODone (DESYREL) 50 mg tablet Take 50 mg by mouth nightly., Historical Med      triamcinolone acetonide (KENALOG) 0.1 % topical cream Apply 1 g to affected area as needed for Skin Irritation or Itching. Use thin layer to arms, back, as needed for itching, Historical Med      acetaminophen (TYLENOL) 650 mg TbER Take 650 mg by mouth every six (6) hours as needed for Pain or Fever., Historical Med       !! - Potential duplicate medications found. Please discuss with provider. STOP taking these medications       polyethylene glycol (MIRALAX) 17 gram packet Comments:   Reason for Stopping:         simvastatin (ZOCOR) 20 mg tablet Comments:   Reason for Stopping:                 Patient Follow Up Instructions: Activity: Activity as tolerated  Diet: Regular Diet  Wound Care: Keep wound clean and dry    Follow-up with  in 1 week.   Follow-up tests/labs     Follow-up Information       Follow up With Specialties Details Why Contact Barbara Gilbert MD Family Medicine Go on 9/1/2022 at 10:40am for your PCP hospital follow up. [facility] Medical [facility]  P[address] [facility] [address] [address] Follow up  [address] [place] [address]  [phone]          ________________________________________________________________    Risk of deterioration: Low    Condition at Discharge:  Stable  __________________________________________________________________    Disposition  Home with family, no needs    ____________________________________________________________________    Code Status: Full Code  ___________________________________________________________________      Total time in minutes spent coordinating this discharge (includes going over instructions, follow-up, prescriptions, and preparing report for sign off to her PCP) :  >30 minutes    Signed:   Claudia Diaz MD

## 2022-08-29 NOTE — PROGRESS NOTES
Attempted to schedule hospital follow up PCP appointment. Office  will contact the patient with appointment information per office protocol. Pending patient discharge.  Tram Moeller, Care Management Assistant

## 2022-08-29 NOTE — PROGRESS NOTES
End of Shift Note    Bedside shift change report given to (oncoming nurse) by Holli Del Real RN (offgoing nurse). Report included the following information SBAR, Kardex, Procedure Summary, Intake/Output, MAR, and Recent Results    Shift worked:  7 pm- 7 am       Shift summary and any significant changes:     No changes     Concerns for physician to address:  Co concerns     Zone phone for oncoming shift:   [phone]       Activity:  Activity Level: Up with Assistance  Number times ambulated in hallways past shift: 0  Number of times OOB to chair past shift: 0    Cardiac:   Cardiac Monitoring: No           Access:  Current line(s): PIV     Genitourinary:   Urinary status: voiding    Respiratory:   O2 Device: None (Room air)  Chronic home O2 use?: NO  Incentive spirometer at bedside: NO       GI:  Last Bowel Movement Date: 08/24/22  Current diet:  DIET NPO Sips of Water with Meds  Passing flatus: YES  Tolerating current diet: YES       Pain Management:   Patient states pain is manageable on current regimen: YES    Skin:  Chavez Score: 20  Interventions: increase time out of bed    Patient Safety:  Fall Score:  Total Score: 2  Interventions: gripper socks and pt to call before getting OOB  High Fall Risk: Yes    Length of Stay:  Expected LOS: 3d 4h  Actual LOS: [address], RN

## 2022-08-30 LAB
BACTERIA SPEC CULT: NORMAL
SERVICE CMNT-IMP: NORMAL

## 2022-09-03 LAB
BACTERIA SPEC CULT: ABNORMAL
SERVICE CMNT-IMP: ABNORMAL

## 2022-09-20 ENCOUNTER — OFFICE VISIT (OUTPATIENT)
Dept: INFECTIOUS DISEASES | Age: 77
End: 2022-09-20
Payer: MEDICARE

## 2022-09-20 VITALS
HEIGHT: 71 IN | OXYGEN SATURATION: 98 % | TEMPERATURE: 98.6 F | RESPIRATION RATE: 18 BRPM | SYSTOLIC BLOOD PRESSURE: 142 MMHG | BODY MASS INDEX: 22.46 KG/M2 | HEART RATE: 102 BPM | WEIGHT: 160.4 LBS | DIASTOLIC BLOOD PRESSURE: 80 MMHG

## 2022-09-20 DIAGNOSIS — I10 PRIMARY HYPERTENSION: ICD-10-CM

## 2022-09-20 DIAGNOSIS — I25.10 CORONARY ARTERY DISEASE INVOLVING NATIVE CORONARY ARTERY OF NATIVE HEART WITHOUT ANGINA PECTORIS: ICD-10-CM

## 2022-09-20 DIAGNOSIS — B95.61 STAPHYLOCOCCUS AUREUS BACTEREMIA: Primary | ICD-10-CM

## 2022-09-20 DIAGNOSIS — E87.1 HYPONATREMIA: ICD-10-CM

## 2022-09-20 DIAGNOSIS — L20.89 OTHER ATOPIC DERMATITIS: ICD-10-CM

## 2022-09-20 DIAGNOSIS — R78.81 STAPHYLOCOCCUS AUREUS BACTEREMIA: Primary | ICD-10-CM

## 2022-09-20 DIAGNOSIS — L03.114 CELLULITIS OF LEFT UPPER EXTREMITY: ICD-10-CM

## 2022-09-20 DIAGNOSIS — I82.619 BASILIC VEIN THROMBOSIS: ICD-10-CM

## 2022-09-20 PROCEDURE — G8536 NO DOC ELDER MAL SCRN: HCPCS | Performed by: INTERNAL MEDICINE

## 2022-09-20 PROCEDURE — 1111F DSCHRG MED/CURRENT MED MERGE: CPT | Performed by: INTERNAL MEDICINE

## 2022-09-20 PROCEDURE — G8427 DOCREV CUR MEDS BY ELIG CLIN: HCPCS | Performed by: INTERNAL MEDICINE

## 2022-09-20 PROCEDURE — 1101F PT FALLS ASSESS-DOCD LE1/YR: CPT | Performed by: INTERNAL MEDICINE

## 2022-09-20 PROCEDURE — G8510 SCR DEP NEG, NO PLAN REQD: HCPCS | Performed by: INTERNAL MEDICINE

## 2022-09-20 PROCEDURE — 99214 OFFICE O/P EST MOD 30 MIN: CPT | Performed by: INTERNAL MEDICINE

## 2022-09-20 PROCEDURE — 1123F ACP DISCUSS/DSCN MKR DOCD: CPT | Performed by: INTERNAL MEDICINE

## 2022-09-20 PROCEDURE — G8420 CALC BMI NORM PARAMETERS: HCPCS | Performed by: INTERNAL MEDICINE

## 2022-09-20 NOTE — PROGRESS NOTES
Infectious Disease clinic      Impression  MSSA bacteremia  High grade  Blood cultures - 8/22+ for MSSA 4/4 -RFA/ RA  Negative cultures - 8/24. TTE- negative , poor image quality. LUE cellulitis & basilic vein thrombosis/ R/arm skin tear, localized cellulitis  Probable source of bacteremia    Musculoskeletal chest pain  S/p recent  fall,  S/p fracture of L/ 7th rib costochondral junction    CAD  HTN   Management per primary team.    Chronic atopic dermatitis   Loss of skin integrity  Follows with derm at [facility]. Hardware present- h/o L/ankle fixation    Plan  Continue cefazolin IV x 4 weeks ending 9/21    Antimicrobial orders   -Cefazolin 2 g IV every 8 hours end date 9/21  -Patient would require surveillance blood cultures prior to pull of PICC  -Pull PICC at end of therapy & if surveillance cultures are negative  -Weekly CBC, CMP-  -Fax reports to [phone], call with critical labs  at [phone]  -Encourage adequate fluids, daily probiotic/yogurt  -If PICC malfunction occurs and home health cannot reposition  please send patient to ED immediately      Possible adverse effects of long term antibiotics are inclusive of but not limited to following  BM suppression, neutropenia , cytopenias , aplastic anemia hemorrhage liver & renal dysfunction/ liver , renal failure  , GI dysfunction- N, V  Diarrhea,C.difficile disease, rash , allergy , anaphylaxis. toxic epidermal necrolysis  Neuro toxicity , seizure disorder  Side effects tend to be more pronounced in the elderly    Patient is seen today in the office on follow-up. Patient was recently hospitalized. H/o  MSSA bacteremia  Patient seen today with sister  Denies complaints. Intermittent diarrhea. Currently no diarrhea. Labs discussed with patient and sister  9/14  WBC 8.5, hemoglobin 6.9, BUN/creatinine 13/0.74  Sodium 126  Patient and sister advised to drink fluids, high sodium Gatorade  Plan is for repeat labs tomorrow, to be drawn stat from peripheral site.   We will monitor hemoglobin and sodium. Patient advised to continue with probiotic. Stop scratching skin. Patient has scars on both upper extremities, irritated skin on upper extremities and chest.  Plan is for surveillance blood cultures 2 days after completing antibiotics. If surveillance blood cultures are negative plan is for pull of PICC. This was discussed at length with patient and sister. Past Medical History:   Diagnosis Date    Arthritis     back,neck,shoulders    Arthritis     CAD (coronary artery disease)     per 10/2011 cardiology note    Cancer [facility]) Oct. 2011    prostate    Cardiomyopathy, alcoholic ([facility] [address]     per  cardiology note    Essential hypertension     Hypercholesteremia     Hypertension     Other ill-defined conditions(799.89)     elevated cholesterol       Past Surgical History:   Procedure Laterality Date    COLONOSCOPY N/A 2016    COLONOSCOPY performed by Kareem Marshall MD at [facility] ENDOSCOPY    HX HEENT      tonsillectomy    HX ORTHOPAEDIC      left ankle plate & screws    HX OTHER SURGICAL      colonoscopy    HX PROSTATE SURGERY      HX PROSTATECTOMY  10/19/11    ROBOTIC ASSISTED LAPAROSCOPIC PROSTATECTOMY WITH LEFT PELVIC LYMPH NODE DISSECTION    [place] CARDIAC SURG PROCEDURE UNLIST      cardiac cath x2 normal,2003    [place] PROSTATE BIOPSY, NEEDLE, SATURATION SAMPLING      2011       Allergies   Allergen Reactions    Adhesive Itching     bandaide causes itching,irritates skin  (9/1/15 - patient states that bandaids only cause irritation if left on for long period. Tested negative for latex allergy)       Social Connections: Not on file       Family Status   Relation Name Status    Mother      Father         Medication Documentation Review Audit                 Review of Systems - Negative except those mentioned in H&P. 14 point ROS      PHYSICAL EXAM:  General:          Alert, cooperative, no acute distress    EENT:              EOMI. Anicteric sclerae. MMM  Resp:               CTA bilaterally, no wheezing or rales. No accessory muscle use  CV:                  Regular  rhythm,  No edema  GI:                   Soft, Non distended, Non tender. +Bowel sounds  Neurologic:      Alert and oriented X 3, normal speech,   Psych:             Good insight. Not anxious nor agitated  Skin:                Excoriated skin noted UE & chest.  No jaundice.   Extremities no swelling  MD Isabella Chapa

## 2022-09-20 NOTE — PROGRESS NOTES
Chief Complaint   Patient presents with    Follow-up     Blood infection     1. \"Have you been to the ER, urgent care clinic since your last visit? Hospitalized since your last visit? \" No    2. \"Have you seen or consulted any other health care providers outside of the [address] [place] [address] since your last visit? \" No     3. For patients aged 39-70: Has the patient had a colonoscopy / FIT/ Cologuard? Yes      If the patient is female:    4. For patients aged 41-77: Has the patient had a mammogram within the past 2 years? N/A      5. For patients aged 21-65: Has the patient had a pap smear? N/A    Pt states he know has neuropathy and have no other concerns.

## 2022-09-20 NOTE — PATIENT INSTRUCTIONS
[employer] Activation    Thank you for requesting access to [employer]. Please follow the instructions below to securely access and download your online medical record. [employer] allows you to send messages to your doctor, view your test results, renew your prescriptions, schedule appointments, and more. How Do I Sign Up? In your internet browser, go to [email or web address]. Click on the First Time User? Click Here link in the Sign In box. You will see the New Member Sign Up page. Enter your [employer] Access Code exactly as it appears below. You will not need to use this code after youve completed the sign-up process. If you do not sign up before the expiration date, you must request a new code. [employer] Access Code: Activation code not generated  Current [employer] Status: Active (This is the date your Netformx access code will )    Enter the last four digits of your Social Security Number (xxxx) and Date of Birth (mm/dd/yyyy) as indicated and click Submit. You will be taken to the next sign-up page. Create a Net[employer]x ID. This will be your Net[employer] login ID and cannot be changed, so think of one that is secure and easy to remember. Create a [employer] password. You can change your password at any time. Enter your Password Reset Question and Answer. This can be used at a later time if you forget your password. Enter your e-mail address. You will receive e-mail notification when new information is available in [address] Click Sign Up. You can now view and download portions of your medical record. Click the [place] link to download a portable copy of your medical information. Additional Information    If you have questions, please visit the Frequently Asked Questions section of the Net[employer] website at [email or web address] Remember, [employer] is NOT to be used for urgent needs. For medical emergencies, dial 911.

## 2022-10-04 ENCOUNTER — APPOINTMENT (OUTPATIENT)
Dept: CT IMAGING | Age: 77
End: 2022-10-04
Attending: EMERGENCY MEDICINE
Payer: MEDICARE

## 2022-10-04 ENCOUNTER — TRANSCRIBE ORDER (OUTPATIENT)
Dept: SCHEDULING | Age: 77
End: 2022-10-04

## 2022-10-04 ENCOUNTER — HOSPITAL ENCOUNTER (EMERGENCY)
Age: 77
Discharge: HOME | End: 2022-10-04
Attending: EMERGENCY MEDICINE
Payer: MEDICARE

## 2022-10-04 VITALS
WEIGHT: 156.97 LBS | DIASTOLIC BLOOD PRESSURE: 90 MMHG | SYSTOLIC BLOOD PRESSURE: 166 MMHG | HEIGHT: 71 IN | RESPIRATION RATE: 15 BRPM | OXYGEN SATURATION: 99 % | BODY MASS INDEX: 21.98 KG/M2 | TEMPERATURE: 98.2 F | HEART RATE: 95 BPM

## 2022-10-04 DIAGNOSIS — R91.1 PULMONARY NODULE: Primary | ICD-10-CM

## 2022-10-04 DIAGNOSIS — R06.02 SOB (SHORTNESS OF BREATH): ICD-10-CM

## 2022-10-04 DIAGNOSIS — I26.90 SEPTIC PULMONARY EMBOLISM (HCC): Primary | ICD-10-CM

## 2022-10-04 LAB
ALBUMIN SERPL-MCNC: 3.9 G/DL (ref 3.5–5)
ALBUMIN/GLOB SERPL: 1.3 {RATIO} (ref 1.1–2.2)
ALP SERPL-CCNC: 72 U/L (ref 45–117)
ALT SERPL-CCNC: 28 U/L (ref 12–78)
ANION GAP SERPL CALC-SCNC: 6 MMOL/L (ref 5–15)
AST SERPL-CCNC: 23 U/L (ref 15–37)
BASOPHILS # BLD: 0.1 K/UL (ref 0–0.1)
BASOPHILS NFR BLD: 1 % (ref 0–1)
BILIRUB SERPL-MCNC: 0.4 MG/DL (ref 0.2–1)
BNP SERPL-MCNC: 1025 PG/ML
BUN SERPL-MCNC: 17 MG/DL (ref 6–20)
BUN/CREAT SERPL: 18 (ref 12–20)
CALCIUM SERPL-MCNC: 9.1 MG/DL (ref 8.5–10.1)
CHLORIDE SERPL-SCNC: 101 MMOL/L (ref 97–108)
CO2 SERPL-SCNC: 25 MMOL/L (ref 21–32)
CREAT SERPL-MCNC: 0.93 MG/DL (ref 0.7–1.3)
DIFFERENTIAL METHOD BLD: ABNORMAL
EOSINOPHIL # BLD: 0.3 K/UL (ref 0–0.4)
EOSINOPHIL NFR BLD: 3 % (ref 0–7)
ERYTHROCYTE [DISTWIDTH] IN BLOOD BY AUTOMATED COUNT: 16.2 % (ref 11.5–14.5)
GLOBULIN SER CALC-MCNC: 2.9 G/DL (ref 2–4)
GLUCOSE SERPL-MCNC: 103 MG/DL (ref 65–100)
HCT VFR BLD AUTO: 35.6 % (ref 36.6–50.3)
HGB BLD-MCNC: 11.5 G/DL (ref 12.1–17)
IMM GRANULOCYTES # BLD AUTO: 0.2 K/UL (ref 0–0.04)
IMM GRANULOCYTES NFR BLD AUTO: 2 % (ref 0–0.5)
LYMPHOCYTES # BLD: 2.1 K/UL (ref 0.8–3.5)
LYMPHOCYTES NFR BLD: 24 % (ref 12–49)
MCH RBC QN AUTO: 30.8 PG (ref 26–34)
MCHC RBC AUTO-ENTMCNC: 32.3 G/DL (ref 30–36.5)
MCV RBC AUTO: 95.4 FL (ref 80–99)
MONOCYTES # BLD: 1.2 K/UL (ref 0–1)
MONOCYTES NFR BLD: 13 % (ref 5–13)
NEUTS SEG # BLD: 5 K/UL (ref 1.8–8)
NEUTS SEG NFR BLD: 57 % (ref 32–75)
NRBC # BLD: 0 K/UL (ref 0–0.01)
NRBC BLD-RTO: 0 PER 100 WBC
PLATELET # BLD AUTO: 193 K/UL (ref 150–400)
PMV BLD AUTO: 9.3 FL (ref 8.9–12.9)
POTASSIUM SERPL-SCNC: 4.6 MMOL/L (ref 3.5–5.1)
PROT SERPL-MCNC: 6.8 G/DL (ref 6.4–8.2)
RBC # BLD AUTO: 3.73 M/UL (ref 4.1–5.7)
SODIUM SERPL-SCNC: 132 MMOL/L (ref 136–145)
TROPONIN-HIGH SENSITIVITY: 10 NG/L (ref 0–76)
WBC # BLD AUTO: 8.7 K/UL (ref 4.1–11.1)

## 2022-10-04 PROCEDURE — 93005 ELECTROCARDIOGRAM TRACING: CPT

## 2022-10-04 PROCEDURE — 71275 CT ANGIOGRAPHY CHEST: CPT

## 2022-10-04 PROCEDURE — 85025 COMPLETE CBC W/AUTO DIFF WBC: CPT

## 2022-10-04 PROCEDURE — 84484 ASSAY OF TROPONIN QUANT: CPT

## 2022-10-04 PROCEDURE — 74011000636 HC RX REV CODE- 636: Performed by: STUDENT IN AN ORGANIZED HEALTH CARE EDUCATION/TRAINING PROGRAM

## 2022-10-04 PROCEDURE — 36415 COLL VENOUS BLD VENIPUNCTURE: CPT

## 2022-10-04 PROCEDURE — 80053 COMPREHEN METABOLIC PANEL: CPT

## 2022-10-04 PROCEDURE — 83880 ASSAY OF NATRIURETIC PEPTIDE: CPT

## 2022-10-04 PROCEDURE — 99285 EMERGENCY DEPT VISIT HI MDM: CPT

## 2022-10-04 RX ADMIN — IOPAMIDOL 100 ML: 755 INJECTION, SOLUTION INTRAVENOUS at 16:53

## 2022-10-04 NOTE — ED PROVIDER NOTES
EMERGENCY DEPARTMENT HISTORY AND PHYSICAL EXAM      Date: 10/4/2022  Patient Name: Ashlee Castelan. History of Presenting Illness     Chief Complaint   Patient presents with    Shortness of Breath     For ten to fifteen days; seen by Dr. Cesar Ortiz today who advise for pt to be checked for blood clot in his lungs and wrote and order for him to have a Chest CTA with and without contrast to rule out a PE       History Provided By: Patient    HPI: Ashlee Castelan., 67 y.o. male with a past medical history significant for hypertension, cardiomyopathy, CAD, hypertension presents to the ED with cc of shortness of breath. He has a shortness of breath been going on for 12 to 15 days. Is better at night after he rests. He notes he is okay in the morning but upon exerting himself he begins to be short of breath and is like that all day. He notes he reads in bed for an hour each night and his shortness of breath resolves with that and he is okay overnight. He denies any chest pain at all and times. Denies any nausea or vomiting or fever. He does have a chronic cough but is no worse. Denies any difficulty swallowing, headache, dizziness. He was seen his cardiologist Dr. Cesar Ortiz today who referred him to the emergency department for a CTA over concern for pulmonary embolism. There are no associated symptoms. No other exacerbating or ameliorating factors. PCP: Jana James MD    No current facility-administered medications on file prior to encounter. Current Outpatient Medications on File Prior to Encounter   Medication Sig Dispense Refill    cetirizine (ZYRTEC) 10 mg tablet Take 10 mg by mouth daily. gabapentin (NEURONTIN) 100 mg capsule Take 100 mg by mouth three (3) times daily.          Past History     Past Medical History:  Past Medical History:   Diagnosis Date    Arthritis     back,neck,shoulders    Arthritis     CAD (coronary artery disease)     per 10/2011 cardiology note    Cancer [facility]) Oct. 2011    prostate    Cardiomyopathy, alcoholic ([facility] [address]     per 88/0837 cardiology note    Essential hypertension     Hypercholesteremia     Hypertension     Other ill-defined conditions(799.89)     elevated cholesterol       Past Surgical History:  Past Surgical History:   Procedure Laterality Date    COLONOSCOPY N/A 11/8/2016    COLONOSCOPY performed by Magdalene Merrill MD at [facility] ENDOSCOPY    HX HEENT      tonsillectomy    HX ORTHOPAEDIC      left ankle plate & screws    HX OTHER SURGICAL      colonoscopy    HX PROSTATE SURGERY      HX PROSTATECTOMY  10/19/11    ROBOTIC ASSISTED LAPAROSCOPIC PROSTATECTOMY WITH LEFT PELVIC LYMPH NODE DISSECTION    SD CARDIAC SURG PROCEDURE UNLIST      cardiac cath x2 normal,2003    SD PROSTATE BIOPSY, NEEDLE, SATURATION SAMPLING      08/17/2011       Family History:  Family History   Problem Relation Age of Onset    Seizures Mother     Stroke Mother        Social History:  Social History     Tobacco Use    Smoking status: Never    Smokeless tobacco: Never   Vaping Use    Vaping Use: Never used   Substance Use Topics    Alcohol use: Yes    Drug use: Never       Allergies: Allergies   Allergen Reactions    Adhesive Itching     bandaide causes itching,irritates skin  (9/1/15 - patient states that bandaids only cause irritation if left on for long period. Tested negative for latex allergy)         Review of Systems   Review of Systems   Constitutional:  Negative for activity change and appetite change. HENT:  Negative for congestion. Eyes:  Negative for visual disturbance. Respiratory:  Positive for shortness of breath. Negative for chest tightness and wheezing. Cardiovascular:  Negative for chest pain, palpitations and leg swelling. Gastrointestinal:  Negative for abdominal distention and abdominal pain. Genitourinary:  Negative for dysuria. Musculoskeletal:  Negative for back pain. Skin:  Negative for rash and wound.    Neurological:  Negative for dizziness and headaches. Hematological:  Does not bruise/bleed easily. Physical Exam   Physical Exam  Vitals and nursing note reviewed. Constitutional:       Appearance: Normal appearance. HENT:      Head: Normocephalic and atraumatic. Nose: Nose normal.      Mouth/Throat:      Mouth: Mucous membranes are moist.      Pharynx: Oropharynx is clear. Eyes:      Extraocular Movements: Extraocular movements intact. Pupils: Pupils are equal, round, and reactive to light. Cardiovascular:      Rate and Rhythm: Normal rate and regular rhythm. Pulmonary:      Effort: Pulmonary effort is normal.      Breath sounds: Normal breath sounds. No decreased breath sounds, wheezing or rhonchi. Abdominal:      General: Abdomen is flat. Palpations: Abdomen is soft. Tenderness: There is no abdominal tenderness. There is no rebound. Musculoskeletal:         General: No swelling or deformity. Normal range of motion. Cervical back: Normal range of motion and neck supple. Right lower leg: No edema. Left lower leg: No edema. Skin:     General: Skin is warm and dry. Neurological:      General: No focal deficit present. Mental Status: He is alert and oriented to person, place, and time.    Psychiatric:         Mood and Affect: Mood normal.         Behavior: Behavior normal.       Diagnostic Study Results     Labs -     Recent Results (from the past 24 hour(s))   EKG, 12 LEAD, INITIAL    Collection Time: 10/04/22  2:51 PM   Result Value Ref Range    Ventricular Rate 97 BPM    Atrial Rate 97 BPM    P-R Interval 152 ms    QRS Duration 144 ms    Q-T Interval 384 ms    QTC Calculation (Bezet) 487 ms    Calculated P Axis 47 degrees    Calculated R Axis -47 degrees    Calculated T Axis 110 degrees    Diagnosis       Sinus rhythm with premature atrial complexes  Left axis deviation  Left bundle branch block  When compared with ECG of 22-AUG-2022 15:49,  No significant change was found     CBC WITH AUTOMATED DIFF    Collection Time: 10/04/22  2:55 PM   Result Value Ref Range    WBC 8.7 4.1 - 11.1 K/uL    RBC 3.73 (L) 4.10 - 5.70 M/uL    HGB 11.5 (L) 12.1 - 17.0 g/dL    HCT 35.6 (L) 36.6 - 50.3 %    MCV 95.4 80.0 - 99.0 FL    MCH 30.8 26.0 - 34.0 PG    MCHC 32.3 30.0 - 36.5 g/dL    RDW 16.2 (H) 11.5 - 14.5 %    PLATELET 781 338 - 438 K/uL    MPV 9.3 8.9 - 12.9 FL    NRBC 0.0 0  WBC    ABSOLUTE NRBC 0.00 0.00 - 0.01 K/uL    NEUTROPHILS 57 32 - 75 %    LYMPHOCYTES 24 12 - 49 %    MONOCYTES 13 5 - 13 %    EOSINOPHILS 3 0 - 7 %    BASOPHILS 1 0 - 1 %    IMMATURE GRANULOCYTES 2 (H) 0.0 - 0.5 %    ABS. NEUTROPHILS 5.0 1.8 - 8.0 K/UL    ABS. LYMPHOCYTES 2.1 0.8 - 3.5 K/UL    ABS. MONOCYTES 1.2 (H) 0.0 - 1.0 K/UL    ABS. EOSINOPHILS 0.3 0.0 - 0.4 K/UL    ABS. BASOPHILS 0.1 0.0 - 0.1 K/UL    ABS. IMM. GRANS. 0.2 (H) 0.00 - 0.04 K/UL    DF AUTOMATED     METABOLIC PANEL, COMPREHENSIVE    Collection Time: 10/04/22  2:55 PM   Result Value Ref Range    Sodium 132 (L) 136 - 145 mmol/L    Potassium 4.6 3.5 - 5.1 mmol/L    Chloride 101 97 - 108 mmol/L    CO2 25 21 - 32 mmol/L    Anion gap 6 5 - 15 mmol/L    Glucose 103 (H) 65 - 100 mg/dL    BUN 17 6 - 20 MG/DL    Creatinine 0.93 0.70 - 1.30 MG/DL    BUN/Creatinine ratio 18 12 - 20      eGFR >60 >60 ml/min/1.73m2    Calcium 9.1 8.5 - 10.1 MG/DL    Bilirubin, total 0.4 0.2 - 1.0 MG/DL    ALT (SGPT) 28 12 - 78 U/L    AST (SGOT) 23 15 - 37 U/L    Alk. phosphatase 72 45 - 117 U/L    Protein, total 6.8 6.4 - 8.2 g/dL    Albumin 3.9 3.5 - 5.0 g/dL    Globulin 2.9 2.0 - 4.0 g/dL    A-G Ratio 1.3 1.1 - 2.2     TROPONIN-HIGH SENSITIVITY    Collection Time: 10/04/22  2:55 PM   Result Value Ref Range    Troponin-High Sensitivity 10 0 - 76 ng/L   NT-PRO BNP    Collection Time: 10/04/22  2:55 PM   Result Value Ref Range    NT pro-BNP 1,025 (H) <450 PG/ML       Radiologic Studies -   CTA CHEST W OR W WO CONT   Final Result   Motion limited   1. No visualized pulmonary embolus. 2. Small area of airspace disease versus nodularity in the medial aspect of the   left lower lobe. Recommend follow-up. 3. Incidental findings as above. CT Results  (Last 48 hours)                 10/04/22 1653  CTA CHEST W OR W WO CONT Final result    Impression:  Motion limited   1. No visualized pulmonary embolus. 2. Small area of airspace disease versus nodularity in the medial aspect of the   left lower lobe. Recommend follow-up. 3. Incidental findings as above. Narrative:  EXAM:  CTA CHEST W OR W WO CONT   INDICATION:  r/o PE. Additional history:   COMPARISON: CT of the chest, 8/22/2022. CT of the abdomen and pelvis, 8/29/2011   . TECHNIQUE:    Precontrast  images were obtained to localize the volume for acquisition. Multislice helical CT arteriography was performed from the diaphragm to the   thoracic inlet during uneventful rapid bolus intravenous contrast   administration. Lung and soft tissue windows were generated. Coronal and   sagittal images were generated and 3D post processing consisting of coronal   maximum intensity images was performed. CT dose reduction was achieved through use of a standardized protocol tailored   for this examination and automatic exposure control for dose modulation. Samara Bloodgood FINDINGS:   CHEST:   Chest wall/thoracic inlet: Within normal limits. Thyroid: Within normal limits. Mediastinum/susanna: Patulous esophagus. Heart/vessels: Calcifications in the coronary arteries. Lungs/Pleura: Respiratory motion. Small area of opacity versus nodule measuring   up to 1 cm in the medial aspect of the left lower lobe (#81, series 4). Tiny,   subpleural nodule in the right major fissure is not significantly changed. Calcified granuloma in the left base. .   INCIDENTALLY IMAGED ABDOMEN:   Small, low-attenuation lesion in the right kidney, incompletely   characterized/visualized, likely unchanged. .   MSK:    Multiple, remote left rib fractures. .             CXR Results  (Last 48 hours)      None              Medical Decision Making   I am the first provider for this patient. I reviewed the vital signs, available nursing notes, past medical history, past surgical history, family history and social history. Vital Signs-Reviewed the patient's vital signs. Patient Vitals for the past 12 hrs:   Temp Pulse Resp BP SpO2   10/04/22 1745 -- 95 15 (!) 166/90 99 %   10/04/22 1640 -- 95 16 (!) 153/85 98 %   10/04/22 1437 98.2 °F (36.8 °C) (!) 106 18 (!) 145/74 99 %       Records Reviewed: Nursing records and medical records reviewed    MDM:  DDx includes pneumonia, CHF, PE    Provider Notes (Medical Decision Making):   58-year-old male with history of CAD, CHF, hypertension presents with shortness of breath. His vital signs show normal blood pressure but he is somewhat tachycardic at 106 bpm.  He is saturating well on room air. He has a normal exam with no respiratory distress or wheezes. Sent in for a CTA by his cardiologist and we will proceed with that. His initial lab work is mostly normal except for an elevated BNP but unknown baseline. His EKG shows a sinus rhythm with PACs and a left axis deviation and a left bundle branch block at a rate of 97 a AL interval of 152 and a QTC of 487. He does not meet Sgarbossa criteria for an MI at this time. ED Course:   Initial assessment performed. The patients presenting problems have been discussed, and they are in agreement with the care plan formulated and outlined with them. I have encouraged them to ask questions as they arise throughout their visit. ED Course as of 10/04/22 1810   Tue Oct 04, 2022   1808 CT negative for any acute findings. He has no blood clots. Discussed with Dr. Fantasma Guadalupe who recommends starting a baby aspirin and the beta-blocker and following up in clinic. We discussed the patient as well as the incidental findings and discharge.  [JS]      ED Course User Index  [JS] Dorene Lara Espinosa MD       Medications Administered       iopamidoL (ISOVUE-370) 76 % injection 100 mL       Admin Date  10/04/2022 Action  Given Dose  100 mL Route  IntraVENous Administered By  Ovidio Monique                      Disposition:  Discharge Note:  6:10 PM  The patient has been re-evaluated and is ready for discharge. Reviewed available results with patient. Counseled patient on diagnosis and care plan. Patient has expressed understanding, and all questions have been answered. Patient agrees with plan and agrees to follow up as recommended, or to return to the ED if their symptoms worsen. Discharge instructions have been provided and explained to the patient, along with reasons to return to the ED. DISCHARGE PLAN:  1. Current Discharge Medication List        2. Follow-up Information       Follow up With Specialties Details Why Contact Info    Fede Irwin MD Cardio Vascular Surgery, Interventional Cardiology Physician, Cardiovascular Disease Physician In 1 week  [address]  [place]  [address] [phone]      [facility] EMERGENCY DEPT Emergency Medicine  If symptoms worsen [address]  [address] [facility]  [phone]          3. Return to ED if worse     Diagnosis     Clinical Impression:   1. Pulmonary nodule    2. SOB (shortness of breath)        Attestations:    Joie Bains MD    Please note that this dictation was completed with [employer], the computer voice recognition software. Quite often unanticipated grammatical, syntax, homophones, and other interpretive errors are inadvertently transcribed by the computer software. Please disregard these errors. Please excuse any errors that have escaped final proofreading. Thank you.

## 2022-10-05 LAB
ATRIAL RATE: 97 BPM
CALCULATED P AXIS, ECG09: 47 DEGREES
CALCULATED R AXIS, ECG10: -47 DEGREES
CALCULATED T AXIS, ECG11: 110 DEGREES
DIAGNOSIS, 93000: NORMAL
P-R INTERVAL, ECG05: 152 MS
Q-T INTERVAL, ECG07: 384 MS
QRS DURATION, ECG06: 144 MS
QTC CALCULATION (BEZET), ECG08: 487 MS
VENTRICULAR RATE, ECG03: 97 BPM

## 2022-10-06 ENCOUNTER — APPOINTMENT (OUTPATIENT)
Dept: CT IMAGING | Age: 77
End: 2022-10-06
Attending: STUDENT IN AN ORGANIZED HEALTH CARE EDUCATION/TRAINING PROGRAM
Payer: COMMERCIAL

## 2022-10-06 ENCOUNTER — HOSPITAL ENCOUNTER (EMERGENCY)
Age: 77
Discharge: HOME | End: 2022-10-06
Attending: STUDENT IN AN ORGANIZED HEALTH CARE EDUCATION/TRAINING PROGRAM
Payer: COMMERCIAL

## 2022-10-06 ENCOUNTER — APPOINTMENT (OUTPATIENT)
Dept: GENERAL RADIOLOGY | Age: 77
End: 2022-10-06
Attending: STUDENT IN AN ORGANIZED HEALTH CARE EDUCATION/TRAINING PROGRAM
Payer: COMMERCIAL

## 2022-10-06 VITALS
RESPIRATION RATE: 14 BRPM | HEART RATE: 73 BPM | WEIGHT: 160 LBS | OXYGEN SATURATION: 100 % | HEIGHT: 71 IN | TEMPERATURE: 97.4 F | BODY MASS INDEX: 22.4 KG/M2 | DIASTOLIC BLOOD PRESSURE: 72 MMHG | SYSTOLIC BLOOD PRESSURE: 149 MMHG

## 2022-10-06 DIAGNOSIS — R55 SYNCOPE AND COLLAPSE: Primary | ICD-10-CM

## 2022-10-06 LAB
ALBUMIN SERPL-MCNC: 3.6 G/DL (ref 3.5–5)
ALBUMIN/GLOB SERPL: 1.3 {RATIO} (ref 1.1–2.2)
ALP SERPL-CCNC: 70 U/L (ref 45–117)
ALT SERPL-CCNC: 26 U/L (ref 12–78)
ANION GAP SERPL CALC-SCNC: 5 MMOL/L (ref 5–15)
APPEARANCE UR: ABNORMAL
AST SERPL-CCNC: 29 U/L (ref 15–37)
BACTERIA URNS QL MICRO: NEGATIVE /HPF
BASOPHILS # BLD: 0.1 K/UL (ref 0–0.1)
BASOPHILS NFR BLD: 1 % (ref 0–1)
BILIRUB SERPL-MCNC: 0.3 MG/DL (ref 0.2–1)
BILIRUB UR QL: NEGATIVE
BNP SERPL-MCNC: 1356 PG/ML
BUN SERPL-MCNC: 24 MG/DL (ref 6–20)
BUN/CREAT SERPL: 28 (ref 12–20)
CALCIUM SERPL-MCNC: 8.7 MG/DL (ref 8.5–10.1)
CHLORIDE SERPL-SCNC: 102 MMOL/L (ref 97–108)
CO2 SERPL-SCNC: 27 MMOL/L (ref 21–32)
COLOR UR: ABNORMAL
CREAT SERPL-MCNC: 0.85 MG/DL (ref 0.7–1.3)
DIFFERENTIAL METHOD BLD: ABNORMAL
EOSINOPHIL # BLD: 0.3 K/UL (ref 0–0.4)
EOSINOPHIL NFR BLD: 4 % (ref 0–7)
EPITH CASTS URNS QL MICRO: ABNORMAL /LPF
ERYTHROCYTE [DISTWIDTH] IN BLOOD BY AUTOMATED COUNT: 15.9 % (ref 11.5–14.5)
GLOBULIN SER CALC-MCNC: 2.8 G/DL (ref 2–4)
GLUCOSE SERPL-MCNC: 106 MG/DL (ref 65–100)
GLUCOSE UR STRIP.AUTO-MCNC: NEGATIVE MG/DL
HCT VFR BLD AUTO: 33.3 % (ref 36.6–50.3)
HGB BLD-MCNC: 11.2 G/DL (ref 12.1–17)
HGB UR QL STRIP: NEGATIVE
HYALINE CASTS URNS QL MICRO: ABNORMAL /LPF (ref 0–2)
IMM GRANULOCYTES # BLD AUTO: 0.1 K/UL (ref 0–0.04)
IMM GRANULOCYTES NFR BLD AUTO: 2 % (ref 0–0.5)
KETONES UR QL STRIP.AUTO: NEGATIVE MG/DL
LEUKOCYTE ESTERASE UR QL STRIP.AUTO: NEGATIVE
LYMPHOCYTES # BLD: 2.4 K/UL (ref 0.8–3.5)
LYMPHOCYTES NFR BLD: 27 % (ref 12–49)
MAGNESIUM SERPL-MCNC: 2.1 MG/DL (ref 1.6–2.4)
MCH RBC QN AUTO: 31.6 PG (ref 26–34)
MCHC RBC AUTO-ENTMCNC: 33.6 G/DL (ref 30–36.5)
MCV RBC AUTO: 94.1 FL (ref 80–99)
MONOCYTES # BLD: 1.2 K/UL (ref 0–1)
MONOCYTES NFR BLD: 14 % (ref 5–13)
NEUTS SEG # BLD: 4.5 K/UL (ref 1.8–8)
NEUTS SEG NFR BLD: 52 % (ref 32–75)
NITRITE UR QL STRIP.AUTO: NEGATIVE
NRBC # BLD: 0 K/UL (ref 0–0.01)
NRBC BLD-RTO: 0 PER 100 WBC
PH UR STRIP: 7.5 [PH] (ref 5–8)
PLATELET # BLD AUTO: 201 K/UL (ref 150–400)
PMV BLD AUTO: 9.6 FL (ref 8.9–12.9)
POTASSIUM SERPL-SCNC: 4.1 MMOL/L (ref 3.5–5.1)
PROT SERPL-MCNC: 6.4 G/DL (ref 6.4–8.2)
PROT UR STRIP-MCNC: ABNORMAL MG/DL
RBC # BLD AUTO: 3.54 M/UL (ref 4.1–5.7)
RBC #/AREA URNS HPF: ABNORMAL /HPF (ref 0–5)
SODIUM SERPL-SCNC: 134 MMOL/L (ref 136–145)
SP GR UR REFRACTOMETRY: 1.01
TROPONIN-HIGH SENSITIVITY: 8 NG/L (ref 0–76)
TROPONIN-HIGH SENSITIVITY: 9 NG/L (ref 0–76)
UROBILINOGEN UR QL STRIP.AUTO: 0.2 EU/DL (ref 0.2–1)
WBC # BLD AUTO: 8.6 K/UL (ref 4.1–11.1)
WBC URNS QL MICRO: ABNORMAL /HPF (ref 0–4)

## 2022-10-06 PROCEDURE — 84484 ASSAY OF TROPONIN QUANT: CPT

## 2022-10-06 PROCEDURE — 93005 ELECTROCARDIOGRAM TRACING: CPT

## 2022-10-06 PROCEDURE — 95717 EEG PHYS/QHP 2-12 HR W/O VID: CPT | Performed by: PSYCHIATRY & NEUROLOGY

## 2022-10-06 PROCEDURE — 81001 URINALYSIS AUTO W/SCOPE: CPT

## 2022-10-06 PROCEDURE — 83735 ASSAY OF MAGNESIUM: CPT

## 2022-10-06 PROCEDURE — 70450 CT HEAD/BRAIN W/O DYE: CPT

## 2022-10-06 PROCEDURE — 80053 COMPREHEN METABOLIC PANEL: CPT

## 2022-10-06 PROCEDURE — 85025 COMPLETE CBC W/AUTO DIFF WBC: CPT

## 2022-10-06 PROCEDURE — 83880 ASSAY OF NATRIURETIC PEPTIDE: CPT

## 2022-10-06 PROCEDURE — 36415 COLL VENOUS BLD VENIPUNCTURE: CPT

## 2022-10-06 PROCEDURE — 99285 EMERGENCY DEPT VISIT HI MDM: CPT

## 2022-10-06 PROCEDURE — 71045 X-RAY EXAM CHEST 1 VIEW: CPT

## 2022-10-06 NOTE — ED PROVIDER NOTES
EMERGENCY DEPARTMENT HISTORY AND PHYSICAL EXAM      Date: 10/6/2022  Patient Name: Jersey Newton. History of Presenting Illness     Chief Complaint   Patient presents with    Seizure     One minute tonic-clonic seizure witnesssed by a home health care provider who was dressing a skin tear on his left upper arm that had bled profusely. He was post ictal when EMS arrived. He is at baseline on arrival to ER. Pt has no prior hx of seizures and takes no meds. History Provided By: Patient    Jersey Case, 67 y.o. male     Is a 76-year-old male history of hypertension, CAD, hypercholesterol presenting with concerns of possible seizure, EMS reports pt One minute tonic-clonic seizure witnesssed by a home health care provider who was dressing a skin tear on his left upper arm that had bled profusely. He was post ictal when EMS arrived. Pt has no prior hx of seizures and takes no meds. Patient states that he does not remember the incident, states that the last that he remembers is his home health nurse working on his wound. Patient states that he has no tongue biting, no lacerations in the mouth, no loss of bowel or bladder. Patient only complaint right now is mild fatigue and notes of breath which has been ongoing times many weeks, patient denies any worsening with exertion, denies any chest pain, denies any recent illnesses including cough, fevers or chills. Denies any history of seizure in the past, denies any shortness of breath, chest pain or other concerns, patient states that he has been in his usual state of health recently, denies any illnesses. Any headache, numbness, tingling, weakness. No other complaints at this time. There are no other complaints, changes, or physical findings at this time. PCP: Socorro Wynne MD    No current facility-administered medications on file prior to encounter.      Current Outpatient Medications on File Prior to Encounter   Medication Sig Dispense Refill    cetirizine (ZYRTEC) 10 mg tablet Take 10 mg by mouth daily. gabapentin (NEURONTIN) 100 mg capsule Take 100 mg by mouth three (3) times daily. Past History     Past Medical History:  Past Medical History:   Diagnosis Date    Arthritis     back,neck,shoulders    Arthritis     CAD (coronary artery disease)     per 10/2011 cardiology note    Cancer [facility]) Oct. 2011    prostate    Cardiomyopathy, alcoholic ([facility] [address]     per 42/6518 cardiology note    Essential hypertension     Hypercholesteremia     Hypertension     Other ill-defined conditions(799.89)     elevated cholesterol       Past Surgical History:  Past Surgical History:   Procedure Laterality Date    COLONOSCOPY N/A 11/8/2016    COLONOSCOPY performed by Wilber Petty MD at [facility] ENDOSCOPY    HX HEENT      tonsillectomy    HX ORTHOPAEDIC      left ankle plate & screws    HX OTHER SURGICAL      colonoscopy    HX PROSTATE SURGERY      HX PROSTATECTOMY  10/19/11    ROBOTIC ASSISTED LAPAROSCOPIC PROSTATECTOMY WITH LEFT PELVIC LYMPH NODE DISSECTION    KS CARDIAC SURG PROCEDURE UNLIST      cardiac cath x2 normal,2003    KS PROSTATE BIOPSY, NEEDLE, SATURATION SAMPLING      08/17/2011       Family History:  Family History   Problem Relation Age of Onset    Seizures Mother     Stroke Mother        Social History:  Social History     Tobacco Use    Smoking status: Never    Smokeless tobacco: Never   Vaping Use    Vaping Use: Never used   Substance Use Topics    Alcohol use: Yes    Drug use: Never       Allergies: Allergies   Allergen Reactions    Adhesive Itching     bandaide causes itching,irritates skin  (9/1/15 - patient states that bandaids only cause irritation if left on for long period. Tested negative for latex allergy)         Review of Systems   Review of Systems   Constitutional:  Positive for activity change and fatigue. Negative for chills and fever. HENT:  Negative for congestion and sneezing.     Respiratory:  Positive for shortness of breath. Negative for cough. Cardiovascular:  Negative for chest pain and leg swelling. Gastrointestinal:  Negative for abdominal pain, constipation, diarrhea, nausea and vomiting. Genitourinary:  Negative for decreased urine volume, dysuria and frequency. Musculoskeletal:  Negative for arthralgias and myalgias. Skin:  Negative for rash. Allergic/Immunologic: Negative for immunocompromised state. Neurological:  Positive for seizures. Negative for dizziness, facial asymmetry, speech difficulty, weakness, light-headedness and headaches. Hematological:  Does not bruise/bleed easily. Psychiatric/Behavioral:  Negative for confusion. Physical Exam   Physical Exam  Vitals reviewed. Constitutional:       General: He is not in acute distress. Appearance: He is not ill-appearing, toxic-appearing or diaphoretic. HENT:      Head: Normocephalic and atraumatic. Mouth/Throat:      Mouth: Mucous membranes are moist.   Eyes:      Extraocular Movements: Extraocular movements intact. Conjunctiva/sclera: Conjunctivae normal.      Pupils: Pupils are equal, round, and reactive to light. Cardiovascular:      Rate and Rhythm: Normal rate. Pulmonary:      Effort: Pulmonary effort is normal. No tachypnea, accessory muscle usage or respiratory distress. Abdominal:      Palpations: Abdomen is soft. Tenderness: There is no abdominal tenderness. There is no guarding or rebound. Musculoskeletal:      Cervical back: Neck supple. Right lower leg: No edema. Left lower leg: No edema. Skin:     General: Skin is warm and dry. Neurological:      General: No focal deficit present. Mental Status: He is alert and oriented to person, place, and time.    Psychiatric:         Mood and Affect: Mood normal.       Diagnostic Study Results     Labs -     Recent Results (from the past 24 hour(s))   EKG, 12 LEAD, INITIAL    Collection Time: 10/06/22  4:17 PM   Result Value Ref Range    Ventricular Rate 68 BPM    Atrial Rate 68 BPM    P-R Interval 146 ms    QRS Duration 148 ms    Q-T Interval 468 ms    QTC Calculation (Bezet) 497 ms    Calculated P Axis 45 degrees    Calculated R Axis -12 degrees    Calculated T Axis 123 degrees    Diagnosis       Normal sinus rhythm  Left bundle branch block  When compared with ECG of 04-OCT-2022 14:51,  premature atrial complexes are no longer present  QRS axis shifted right  T wave amplitude has decreased in Anterior leads     CBC WITH AUTOMATED DIFF    Collection Time: 10/06/22  4:28 PM   Result Value Ref Range    WBC 8.6 4.1 - 11.1 K/uL    RBC 3.54 (L) 4.10 - 5.70 M/uL    HGB 11.2 (L) 12.1 - 17.0 g/dL    HCT 33.3 (L) 36.6 - 50.3 %    MCV 94.1 80.0 - 99.0 FL    MCH 31.6 26.0 - 34.0 PG    MCHC 33.6 30.0 - 36.5 g/dL    RDW 15.9 (H) 11.5 - 14.5 %    PLATELET 824 453 - 731 K/uL    MPV 9.6 8.9 - 12.9 FL    NRBC 0.0 0  WBC    ABSOLUTE NRBC 0.00 0.00 - 0.01 K/uL    NEUTROPHILS 52 32 - 75 %    LYMPHOCYTES 27 12 - 49 %    MONOCYTES 14 (H) 5 - 13 %    EOSINOPHILS 4 0 - 7 %    BASOPHILS 1 0 - 1 %    IMMATURE GRANULOCYTES 2 (H) 0.0 - 0.5 %    ABS. NEUTROPHILS 4.5 1.8 - 8.0 K/UL    ABS. LYMPHOCYTES 2.4 0.8 - 3.5 K/UL    ABS. MONOCYTES 1.2 (H) 0.0 - 1.0 K/UL    ABS. EOSINOPHILS 0.3 0.0 - 0.4 K/UL    ABS. BASOPHILS 0.1 0.0 - 0.1 K/UL    ABS. IMM. GRANS. 0.1 (H) 0.00 - 0.04 K/UL    DF AUTOMATED     METABOLIC PANEL, COMPREHENSIVE    Collection Time: 10/06/22  4:28 PM   Result Value Ref Range    Sodium 134 (L) 136 - 145 mmol/L    Potassium 4.1 3.5 - 5.1 mmol/L    Chloride 102 97 - 108 mmol/L    CO2 27 21 - 32 mmol/L    Anion gap 5 5 - 15 mmol/L    Glucose 106 (H) 65 - 100 mg/dL    BUN 24 (H) 6 - 20 MG/DL    Creatinine 0.85 0.70 - 1.30 MG/DL    BUN/Creatinine ratio 28 (H) 12 - 20      eGFR >60 >60 ml/min/1.73m2    Calcium 8.7 8.5 - 10.1 MG/DL    Bilirubin, total 0.3 0.2 - 1.0 MG/DL    ALT (SGPT) 26 12 - 78 U/L    AST (SGOT) 29 15 - 37 U/L    Alk.  phosphatase 70 45 - 117 U/L    Protein, total 6.4 6.4 - 8.2 g/dL    Albumin 3.6 3.5 - 5.0 g/dL    Globulin 2.8 2.0 - 4.0 g/dL    A-G Ratio 1.3 1.1 - 2.2     TROPONIN-HIGH SENSITIVITY    Collection Time: 10/06/22  5:31 PM   Result Value Ref Range    Troponin-High Sensitivity 9 0 - 76 ng/L   MAGNESIUM    Collection Time: 10/06/22  7:33 PM   Result Value Ref Range    Magnesium 2.1 1.6 - 2.4 mg/dL   URINALYSIS W/ RFLX MICROSCOPIC    Collection Time: 10/06/22  7:33 PM   Result Value Ref Range    Color YELLOW/STRAW      Appearance CLOUDY (A) CLEAR      Specific gravity 1.014      pH (UA) 7.5 5.0 - 8.0      Protein TRACE (A) NEG mg/dL    Glucose Negative NEG mg/dL    Ketone Negative NEG mg/dL    Bilirubin Negative NEG      Blood Negative NEG      Urobilinogen 0.2 0.2 - 1.0 EU/dL    Nitrites Negative NEG      Leukocyte Esterase Negative NEG      WBC 0-4 0 - 4 /hpf    RBC 0-5 0 - 5 /hpf    Epithelial cells FEW FEW /lpf    Bacteria Negative NEG /hpf    Hyaline cast 0-2 0 - 2 /lpf   NT-PRO BNP    Collection Time: 10/06/22  7:33 PM   Result Value Ref Range    NT pro-BNP 1,356 (H) <450 PG/ML   TROPONIN-HIGH SENSITIVITY    Collection Time: 10/06/22  7:33 PM   Result Value Ref Range    Troponin-High Sensitivity 8 0 - 76 ng/L       Radiologic Studies -   XR CHEST PORT   Final Result   1. No radiographic evidence of acute cardiopulmonary disease. CT HEAD WO CONT   Final Result   1. No evidence of acute intracranial abnormality by this modality. CT Results  (Last 48 hours)                 10/06/22 1714  CT HEAD WO CONT Final result    Impression:  1. No evidence of acute intracranial abnormality by this modality. Narrative:  EXAM:  CT HEAD WO CONT   INDICATION:   Seizure   Additional history:   COMPARISON: CT of the head, 8/12/2022. .   TECHNIQUE:    Unenhanced CT of the head was performed using 5 mm images. Coronal and sagittal   reformats were produced. Brain and bone windows were generated.     CT dose reduction was achieved through use of a standardized protocol tailored   for this examination and automatic exposure control for dose modulation. Seble Henley FINDINGS:   The ventricles and sulci are normal in size, shape and configuration and   midline. There is no significant white matter disease. There is no intracranial   hemorrhage, extra-axial collection, mass, mass effect or midline shift. The   basilar cisterns are open. No acute infarct is identified. The bone windows demonstrate no abnormalities. The visualized portions of the   paranasal sinuses and mastoid air cells are clear. .             CXR Results  (Last 48 hours)                 10/06/22 1820  XR CHEST PORT Final result    Impression:  1. No radiographic evidence of acute cardiopulmonary disease. Narrative:  INDICATION: . Seizure, SOB   Additional history: Dyspnea   COMPARISON: Previous chest xray, 8/12/2022. LIMITATIONS: Portable technique. Seble Henley FINDINGS: Single frontal view of the chest.    .   Lines/tubes/surgical: Cardiac monitor leads overly the patient. Heart/mediastinum: Unremarkable. Lungs/pleura:  No focal consolidation or mass. No visualized pleural effusion or   pneumothorax. Additional Comments: Electronic device overlies the left chest.    .                 Medical Decision Making   I am the first provider for this patient. I reviewed the vital signs, available nursing notes, past medical history, past surgical history, family history and social history. Vital Signs-Reviewed the patient's vital signs.   Patient Vitals for the past 12 hrs:   Temp Pulse Resp BP SpO2   10/06/22 2141 -- 73 14 (!) 149/72 100 %   10/06/22 2111 -- 88 13 (!) 156/82 99 %   10/06/22 2030 -- 71 11 (!) 144/73 98 %   10/06/22 1926 97.4 °F (36.3 °C) 74 12 (!) 143/76 98 %   10/06/22 1745 -- 70 12 (!) 140/71 --   10/06/22 1645 -- 82 30 127/89 96 %   10/06/22 1630 -- 66 11 133/68 99 %   10/06/22 1622 97.6 °F (36.4 °C) 69 11 (!) 141/76 97 %       Records Reviewed: Nursing records and medical records reviewed    Ddx: Seizure, syncope, head injury     Initial assessment performed. The patients presenting problems have been discussed, and they are in agreement with the care plan formulated and outlined with them. I have encouraged them to ask questions as they arise throughout their visit. MDM  Number of Diagnoses or Management Options  Syncope and collapse  Diagnosis management comments: Is well-appearing 65-year-old presenting after concerns of possible seizure, reportedly had 1 minute of tonic-clonic episode, was postictal when EMS arrived, did not receive any medications per report, patient arrived stable, no history of seizures, only complaint is of fatigue and complaining of shortness of breath which has been ongoing times many weeks, oxygen saturations above 95%, resting comfortably, vital signs within normal limits, no history of tongue biting or bowel or bladder loss, concern of possible seizure given age, will obtain CT head due to this, will also consider using cerebellar to detect postictal state. Patient overall well-appearing on arrival, neurologically intact, will obtain CT head, basic lab work to evaluate for possible seizure, also concern of possible syncopal episode with ongoing shortness of breath and cardiac history. We will plan on troponin and BNP, chest x-ray and reevaluate.        Amount and/or Complexity of Data Reviewed  Clinical lab tests: reviewed  Tests in the medicine section of CPT®: reviewed  Decide to obtain previous medical records or to obtain history from someone other than the patient: yes        ED Course as of 10/06/22 2230   Thu Oct 06, 2022   3419 Lab work unremarkable, he had within normal limits, given this we will plan on cerebella to evaluate for abnormalities, if abnormal patient will likely need further evaluation with MRI, if normal will reevaluate  [RN]   9124 Saida without any seizure activity, highly suspicious of syncope, troponin negative x2, later recently which was normal BNP elevated but consistent with prior, talking to family states that he has been fluid restricting secondary to sodium changes, BP has been here stable will road test for dc and have him follow up with pcp    [RN]      ED Course User Index  [RN] Yoel Aburto MD           Procedures        Disposition: Discharge Note:  10:30 PM  The patient has been re-evaluated and is ready for discharge. Reviewed available results with patient. Counseled patient on diagnosis and care plan. Patient has expressed understanding, and all questions have been answered. Patient agrees with plan and agrees to follow up as recommended, or to return to the ED if their symptoms worsen. Discharge instructions have been provided and explained to the patient, along with reasons to return to the ED. Dc      DISCHARGE PLAN:  1. Current Discharge Medication List        2. Follow-up Information       Follow up With Specialties Details Why Contact Info    Kristie Tran MD Family Medicine Schedule an appointment as soon as possible for a visit in 3 days  [address]  [phone]      [facility] [address] [facility] DEPT Emergency Medicine Call   [address]  [phone]          3. Return to ED if worse     Diagnosis     Clinical Impression:   1. Syncope and collapse        Attestations:    Angie Poe MD    Please note that this dictation was completed with [employer], the computer voice recognition software. Quite often unanticipated grammatical, syntax, homophones, and other interpretive errors are inadvertently transcribed by the computer software. Please disregard these errors. Please excuse any errors that have escaped final proofreading. Thank you.

## 2022-10-07 LAB
ATRIAL RATE: 68 BPM
CALCULATED P AXIS, ECG09: 45 DEGREES
CALCULATED R AXIS, ECG10: -12 DEGREES
CALCULATED T AXIS, ECG11: 123 DEGREES
DIAGNOSIS, 93000: NORMAL
P-R INTERVAL, ECG05: 146 MS
Q-T INTERVAL, ECG07: 468 MS
QRS DURATION, ECG06: 148 MS
QTC CALCULATION (BEZET), ECG08: 497 MS
VENTRICULAR RATE, ECG03: 68 BPM

## 2022-10-07 NOTE — PROCEDURES
EEG REPORT    Patient Name: Emelia Knapp. : 1945  Age: 67 y.o. Ordering physician: Dr. J Carlos Welch  Date of EEG: 10/6/2022   18:11 -    20:50    Diagnosis: syncope  Interpreting physician: Jose Roberto Guerra D.O. FAAN    Procedure: EEG    CLINICAL INDICATION: The patient is a 67 y.o. male who is being evaluated for baseline electro cerebral activities and to rule out seizure focus. No current facility-administered medications for this encounter. Current Outpatient Medications   Medication Sig    cetirizine (ZYRTEC) 10 mg tablet Take 10 mg by mouth daily. gabapentin (NEURONTIN) 100 mg capsule Take 100 mg by mouth three (3) times daily. DESCRIPTION OF PROCEDURE:     This is a digitally recorded electroencephalogram    Description of procedure: This EEG was obtained using a 10 lead, 8 channel system positioned circumferentially without any parasagittal coverage (rapid EEG). Computer selected EEG is reviewed as well as background features and all clinically significant events. Clarity algorithm utilized and implemented to provide analysis of underlying activity and seizure detection used to facilitate reading. Description of recording:     During maximal wakefulness, the background activity of this EEG consists of a posterior dominant rhythm of approximately 9 Hz that is well-developed, symmetric, and reactive to eye-opening. This background activity slows with the onset of drowsiness. No epileptiform discharges, focal slowing, or electrographic seizures were noted throughout the recording. Impression:   Normal EEG during the awake and drowsy states. Comment:   A normal EEG does not rule out the diagnosis of a seizure disorder; clinical  correlation is advised.   If there is still persistent suspicion for continued seizure-like  activity, I would recommend obtaining an electroencephalogram with the 10-20 international system for improved spatial resolution and parasagittal coverage. Marcin Guerra D.O.   Cody Miller

## 2022-10-28 NOTE — PROGRESS NOTES
Neurology Note    Patient ID:  Kennedy Pinedo  [id number]  18 y.o.  1945      Date of Consultation:  October 31, 2022      Subjective: my feet are still numb. I have been hospitalized a couple times for syncope       History of Present Illness:   Kennedy Ochoa is a 67 y.o. male who returns to the neurology clinic at [facility] for an evaluation. He was last seen in clinic on January 31, 2022. Please see my history of present illness, examination, and treatment plan from that day. He was being followed for an idiopathic sensorimotor axonal neuropathy. Since that time, he has been hospitalized both at [facility] and in the [facility] system for syncopal events. From reviewing the records at [facility], he had significant bradycardia and medication adjustments were made. He continues to be followed by cardiology in the outpatient setting, Dr. Soo Bourgeois. He did have a visit to the hospital and October where he had an episode of loss of consciousness where his eyes rolled back and head he became stiff and had generalized convulsions for approximately 1 minute. He was confused afterwards. In the emergency department he did have a rapid EEG on October 7 that was normal.  From reviewing emergency room records, it was felt by the ER provider that this was most likely a syncopal event with secondary convulsion and it was recommended that he have a follow-up with his primary care doctor. He has not been back to see neurology since that event. In regards to his neuropathy which she has been seen for predominantly he does have persistent numbness in his lower extremities and a slight bit now in his fingertips. There has been no pain burning or tingling associated with it. When he mentioned his neuropathy to one of his other physicians he was started on a low-dose of gabapentin but has run out of it. He does not felt the gabapentin helped with his numbness.       Past Medical History: Diagnosis Date    Arthritis     back,neck,shoulders    Arthritis     CAD (coronary artery disease)     per 10/2011 cardiology note    Cancer ([facility] 10/01/2011    prostate    Cardiomyopathy, alcoholic ([facility] [address]     per 38/3027 cardiology note    Epilepsy ([facility] [address] 10/2022    Essential hypertension     Hypercholesteremia     Hypertension     Neuropathy     Other ill-defined conditions(799.89)     elevated cholesterol    Syncope and collapse         Past Surgical History:   Procedure Laterality Date    COLONOSCOPY N/A 11/8/2016    COLONOSCOPY performed by Mikey Nguyễn MD at [facility] ENDOSCOPY    HX HEENT      tonsillectomy    HX ORTHOPAEDIC      left ankle plate & screws    HX OTHER SURGICAL      colonoscopy    HX PROSTATE SURGERY      HX PROSTATECTOMY  10/19/11    ROBOTIC ASSISTED LAPAROSCOPIC PROSTATECTOMY WITH LEFT PELVIC LYMPH NODE DISSECTION    [facility] PROCEDURE UNLIST      cardiac cath x2 normal,2003    [facility] PROSTATE BIOPSY, NEEDLE, SATURATION SAMPLING      08/17/2011        Family History   Problem Relation Age of Onset    Seizures Mother     Stroke Mother     Heart Disease Mother     Cancer Mother         Social History     Tobacco Use    Smoking status: Never    Smokeless tobacco: Never   Substance Use Topics    Alcohol use: Not Currently        Allergies   Allergen Reactions    Adhesive Itching     bandaide causes itching,irritates skin  (9/1/15 - patient states that bandaids only cause irritation if left on for long period. Tested negative for latex allergy)        Prior to Admission medications    Medication Sig Start Date End Date Taking? Authorizing Provider   nystatin (MYCOSTATIN) powder Apply  to affected area two (2) times a day. 8/19/22 8/19/23 Yes Provider, Historical   pantothenic ac-min oil-pet,hyd (Aquaphor Healing) 41 % ointment Apply  to affected area two (2) times daily as needed. 8/19/22 8/19/23 Yes Provider, Historical   senna (SENOKOT) 8.6 mg tablet Take 8.6 mg by mouth At bedtime.  8/19/22 8/19/23 Yes Provider, Historical   triamcinolone acetonide (KENALOG) 0.1 % topical cream triamcinolone acetonide 0.1 % topical cream   APPLY TO THE AFFECTED AREA(S) TOPICALLY TWICE DAILY 8/19/22  Yes Provider, Historical   albuterol (PROVENTIL HFA, VENTOLIN HFA, PROAIR HFA) 90 mcg/actuation inhaler INHALE TWO PUFFS BY MOUTH EVERY 4 HOURS AS NEEDED 9/26/22   Provider, Historical   amLODIPine (NORVASC) 5 mg tablet amlodipine 5 mg tablet   TAKE ONE TABLET BY MOUTH ONE TIME DAILY    Provider, Historical   aspirin delayed-release 81 mg tablet Take 81 mg by mouth daily. Provider, Historical   atorvastatin (LIPITOR) 10 mg tablet atorvastatin 10 mg tablet   TAKE ONE TABLET BY MOUTH ONE TIME DAILY    Provider, Historical   buPROPion XL (WELLBUTRIN XL) 150 mg tablet Take 150 mg by mouth daily. 9/26/22   Provider, Historical   carvediloL (COREG) 25 mg tablet carvedilol 25 mg tablet   TAKE ONE TABLET BY MOUTH TWICE A DAY    Provider, Historical   clotrimazole (MYCELEX) 10 mg nicky clotrimazole 10 mg nicky   DISSOLVE 1 NICKY SLOWLY IN MOUTH 5 TIMES A DAY FOR 14 DAYS GENERIC FOR [facility]    Provider, Historical   clobetasoL (TEMOVATE) 0.05 % ointment clobetasol 0.05 % topical ointment   APPLY TO RASH ON ARMS, LEGS, AND BODY TWICE A DAY WHEN RASH IS PRESENT FOR 30 DAYS    Provider, Historical   Tiadylt  mg capsule Take 120 mg by mouth daily.  8/3/22   Provider, Historical   doxepin (SINEquan) 10 mg capsule doxepin 10 mg capsule   TAKE ONE CAPSULE BY MOUTH AT BEDTIME    Provider, Historical   hydrOXYzine HCL (ATARAX) 10 mg tablet hydroxyzine HCl 10 mg tablet   TAKE ONE TABLET BY MOUTH AT BEDTIME    Provider, Historical   losartan (COZAAR) 50 mg tablet losartan 50 mg tablet   TAKE ONE TABLET BY MOUTH ONE TIME DAILY    Provider, Historical   camphor-menthoL (SARNA) 0.5-0.5 % lotion Sarna Original 0.5 %-0.5 % lotion   APPLY TOPICALLY TO THE AFFECTED AREAS AS NEEDED FOR ITCHING    Provider, Historical   metoprolol succinate (TOPROL-XL) 25 mg XL tablet Take 25 mg by mouth daily. 10/4/22   Provider, Historical   metoprolol tartrate (LOPRESSOR) 25 mg tablet Take  by mouth. Provider, Historical   rosuvastatin (CRESTOR) 5 mg tablet Take 5 mg by mouth daily. 9/29/22   Provider, Historical   simvastatin (ZOCOR) 20 mg tablet Take 20 mg by mouth. Provider, Historical   zaleplon (SONATA) 10 mg capsule zaleplon 10 mg capsule   TAKE ONE CAPSULE BY MOUTH AT BEDTIME AS NEEDED FOR SLEEP    Provider, Historical   zolpidem (AMBIEN) 10 mg tablet zolpidem 10 mg tablet   TAKE ONE TABLET BY MOUTH AT BEDTIME    Provider, Historical   cetirizine (ZYRTEC) 10 mg tablet Take 10 mg by mouth daily. Provider, Historical   gabapentin (NEURONTIN) 100 mg capsule Take 100 mg by mouth three (3) times daily. Provider, Historical       Review of Systems:    General, constitutional: negative  Eyes, vision: negative  Ears, nose, throat: negative  Cardiovascular, heart: negative  Respiratory: negative  Gastrointestinal: negative  Genitourinary: negative  Musculoskeletal: Back and joint pain  Skin and integumentary: Multiple skin bruises  Psychiatric: negative  Endocrine: negative  Neurological: negative, except for HPI  Hematologic/lymphatic: negative  Allergy/immunology: negative      Objective:     Visit Vitals  /70 (BP 1 Location: Left arm, BP Patient Position: Sitting, BP Cuff Size: Adult)   Pulse 98   Resp 16   Ht 5' 11\" (1.803 m)   Wt 160 lb (72.6 kg)   SpO2 98%   BMI 22.32 kg/m²         Physical Exam:  General:  appears well nourished in no acute distress  Neck: no carotid bruits  Lungs: clear to auscultation  Heart:  no murmurs, regular rate  Lower extremity: peripheral pulses palpable and no edema. Skin: intact, ever there is multiple skin lesions    Neurological exam:    Awake, alert, oriented to person, place and time  Recent and remote memory were normal  Attention and concentration were intact  Language was intact.   There was no aphasia  Speech: no dysarthria  Fund of knowledge was preserved    Cranial nerves:   II-XII were tested    Perrrla  Visual fields were full  Eomi, no evidence of nystagmus  Facial sensation:  normal and symmetric  Facial motor: normal and symmetric  Hearing is diminished. unchanged  SCM strength intact  Tongue: midline without fasciculations    Motor: Tone normal    No evidence of fasciculations    Strength testing:   deltoid triceps biceps Wrist ext. Wrist flex. intrinsics Hip flex. Hip ext. Knee ext. Knee flex Dorsi flex Plantar flex   Right 5 5 5 5 5 5 5 5 5 5 5 5   Left 5 5 5 5 5 5 5 5 5 5 5 5       Sensory:  Upper extremity: intact to pp, light touch, and vibration > 10 seconds  Lower extremity: His vibration is absent at his toes and present for only 2 seconds at his ankles. his pinprick is decreased to mid shin. Reflexes:    Right Left  Biceps  2 2  Triceps 2 2  Brachiorad. 2 2  Patella  2 2  Achilles 1 1    Plantar response:  flexor bilaterally    Cerebellar testing:  no tremor apparent, finger/nose and frank were intact    Romberg: Present    Gait: steady however wide-based. Labs:     Labs pertinent to his neuropathy were checked in the past:  This includes a normal vitamin B12 level. A normal serum immunofixation. A normal methylmalonic acid. A normal hemoglobin A1c. Normal liver functioning  Normal free T4  Sjogren's testing was normal  Normal CARSON        Assessment and Plan: This is a pleasant 67year-old gentleman with multiple problems including dermatitis and lumbar degeneration which is impacting his overall neurological health who presents with difficulty with balance and leg stiffness. 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 Examination is relatively unchanged from prior visit 7 months ago. Events include recurrent syncopal events. 1 with an avulsion.     1. Sensory motor axonal neuropathy    He did have significant serology which was performed which did not provide any clear etiology for his neuropathy. This does fall in the category of idiopathic. We reviewed this again today. His lumbar spine disease does contribute some to his overall symptoms of sensory loss. We did talk about medication that can help with neuropathic pain, but at this time he only has symptoms of numbness. Given this, we will hold off on any medication. He is content with this. If he does develop neuropathic pain, he will notify me and we can look into starting a medication    Neuropathy:  we reviewed the causes contributing to the neuropathy. We discussed the importance of exercise and activity. I also reviewed the importance of safety with ambulation and ways to prevents falls. I really stressed to him the importance of preventing falls. I did recommend that he uses his hiking stick. We also talked about the importance of trying to increase his daily activity. 2.  Recurrent syncope events:  Given the most recent event did have convulsion associated with it, I will have him obtain a 24-hour EEG. I will also have him wear an event monitor and I did recommend that he have follow-up with his cardiologist.    3. Lumbar spine disease:  He will continue with his stretching and core strengthening. There was no surgical intervention recommended previously  He will continue with anti-inflammatories    4. Dermatitis:  He will continue to follow closely with his primary care doctor and his dermatologist.       The patient should return to clinic in 6 months  Renewed medication: none at this time    I spent  42 minutes on the day of the encounter preparing the office visit by reviewing medical records, obtaining a history, performing examination, counseling and educating the patient and his family member on diagnosis, documenting in the clinical medical record, and coordinating the care for the patient.   The patient had the ability to ask questions and all questions were answered.           Signed By:  Patricia Espinoza DO FAAN    October 31, 2022

## 2022-10-31 ENCOUNTER — OFFICE VISIT (OUTPATIENT)
Dept: NEUROLOGY | Age: 77
End: 2022-10-31
Payer: MEDICARE

## 2022-10-31 VITALS
RESPIRATION RATE: 16 BRPM | DIASTOLIC BLOOD PRESSURE: 70 MMHG | WEIGHT: 160 LBS | OXYGEN SATURATION: 98 % | HEIGHT: 71 IN | BODY MASS INDEX: 22.4 KG/M2 | HEART RATE: 98 BPM | SYSTOLIC BLOOD PRESSURE: 130 MMHG

## 2022-10-31 DIAGNOSIS — R55 SYNCOPE, UNSPECIFIED SYNCOPE TYPE: Primary | ICD-10-CM

## 2022-10-31 DIAGNOSIS — M51.36 LUMBAR DEGENERATIVE DISC DISEASE: ICD-10-CM

## 2022-10-31 DIAGNOSIS — G62.9 NEUROPATHY: ICD-10-CM

## 2022-10-31 DIAGNOSIS — G60.9 IDIOPATHIC PERIPHERAL NEUROPATHY: ICD-10-CM

## 2022-10-31 PROCEDURE — G8510 SCR DEP NEG, NO PLAN REQD: HCPCS | Performed by: PSYCHIATRY & NEUROLOGY

## 2022-10-31 PROCEDURE — 99215 OFFICE O/P EST HI 40 MIN: CPT | Performed by: PSYCHIATRY & NEUROLOGY

## 2022-10-31 PROCEDURE — 1123F ACP DISCUSS/DSCN MKR DOCD: CPT | Performed by: PSYCHIATRY & NEUROLOGY

## 2022-10-31 PROCEDURE — 1101F PT FALLS ASSESS-DOCD LE1/YR: CPT | Performed by: PSYCHIATRY & NEUROLOGY

## 2022-10-31 PROCEDURE — G8420 CALC BMI NORM PARAMETERS: HCPCS | Performed by: PSYCHIATRY & NEUROLOGY

## 2022-10-31 PROCEDURE — G8536 NO DOC ELDER MAL SCRN: HCPCS | Performed by: PSYCHIATRY & NEUROLOGY

## 2022-10-31 PROCEDURE — G8427 DOCREV CUR MEDS BY ELIG CLIN: HCPCS | Performed by: PSYCHIATRY & NEUROLOGY

## 2022-10-31 RX ORDER — CARVEDILOL 25 MG/1
TABLET ORAL
COMMUNITY

## 2022-10-31 RX ORDER — ZOLPIDEM TARTRATE 10 MG/1
TABLET ORAL
COMMUNITY

## 2022-10-31 RX ORDER — ALBUTEROL SULFATE 90 UG/1
AEROSOL, METERED RESPIRATORY (INHALATION)
COMMUNITY
Start: 2022-09-26

## 2022-10-31 RX ORDER — LOSARTAN POTASSIUM 50 MG/1
TABLET ORAL
COMMUNITY

## 2022-10-31 RX ORDER — HYDROXYZINE HYDROCHLORIDE 10 MG/1
TABLET, FILM COATED ORAL
COMMUNITY

## 2022-10-31 RX ORDER — SIMVASTATIN 20 MG/1
20 TABLET, FILM COATED ORAL
COMMUNITY

## 2022-10-31 RX ORDER — AMLODIPINE BESYLATE 5 MG/1
TABLET ORAL
COMMUNITY

## 2022-10-31 RX ORDER — NYSTATIN 100000 [USP'U]/G
POWDER TOPICAL 2 TIMES DAILY
COMMUNITY
Start: 2022-08-19 | End: 2023-08-19

## 2022-10-31 RX ORDER — BUPROPION HYDROCHLORIDE 150 MG/1
150 TABLET ORAL DAILY
COMMUNITY
Start: 2022-09-26

## 2022-10-31 RX ORDER — METOPROLOL SUCCINATE 25 MG/1
25 TABLET, EXTENDED RELEASE ORAL DAILY
COMMUNITY
Start: 2022-10-04

## 2022-10-31 RX ORDER — METOPROLOL TARTRATE 25 MG/1
TABLET, FILM COATED ORAL
COMMUNITY

## 2022-10-31 RX ORDER — TRIAMCINOLONE ACETONIDE 1 MG/G
CREAM TOPICAL
COMMUNITY
Start: 2022-08-19

## 2022-10-31 RX ORDER — PETROLATUM,WHITE 41 %
OINTMENT (GRAM) TOPICAL
COMMUNITY
Start: 2022-08-19 | End: 2023-08-19

## 2022-10-31 RX ORDER — DILTIAZEM HYDROCHLORIDE 120 MG/1
120 CAPSULE, EXTENDED RELEASE ORAL DAILY
COMMUNITY
Start: 2022-08-03

## 2022-10-31 RX ORDER — SENNOSIDES 8.6 MG/1
8.6 TABLET ORAL AT BEDTIME
COMMUNITY
Start: 2022-08-19 | End: 2023-08-19

## 2022-10-31 RX ORDER — CLOTRIMAZOLE 10 MG/1
LOZENGE ORAL; TOPICAL
COMMUNITY

## 2022-10-31 RX ORDER — ATORVASTATIN CALCIUM 10 MG/1
TABLET, FILM COATED ORAL
COMMUNITY

## 2022-10-31 RX ORDER — DOXEPIN HYDROCHLORIDE 10 MG/1
CAPSULE ORAL
COMMUNITY

## 2022-10-31 RX ORDER — ROSUVASTATIN CALCIUM 5 MG/1
5 TABLET, COATED ORAL DAILY
COMMUNITY
Start: 2022-09-29

## 2022-10-31 RX ORDER — ZALEPLON 10 MG/1
CAPSULE ORAL
COMMUNITY

## 2022-10-31 RX ORDER — ASPIRIN 81 MG/1
81 TABLET ORAL DAILY
COMMUNITY

## 2022-10-31 RX ORDER — CLOBETASOL PROPIONATE 0.5 MG/G
OINTMENT TOPICAL
COMMUNITY

## 2022-10-31 NOTE — LETTER
10/31/2022    Patient: Emelia Knapp. YOB: 1945   Date of Visit: 10/31/2022     Arnulfo Weaver MD  [address] [place] [address]  Via Fax: [phone]    Dear Arnulfo Weaver MD,      Thank you for referring Mr. Chadwick Messer to [address] [place] [address] for evaluation. My notes for this consultation are attached. If you have questions, please do not hesitate to call me. I look forward to following your patient along with you.       Sincerely,    Marcin Guerra, DO

## 2022-11-01 ENCOUNTER — TRANSCRIBE ORDER (OUTPATIENT)
Dept: SCHEDULING | Age: 77
End: 2022-11-01

## 2022-11-01 DIAGNOSIS — Z00.00 ROUTINE GENERAL MEDICAL EXAMINATION AT A HEALTH CARE FACILITY: Primary | ICD-10-CM

## 2022-12-13 ENCOUNTER — HOSPITAL ENCOUNTER (OUTPATIENT)
Dept: NON INVASIVE DIAGNOSTICS | Age: 77
Discharge: HOME | End: 2022-12-13
Attending: PSYCHIATRY & NEUROLOGY
Payer: MEDICARE

## 2022-12-13 ENCOUNTER — HOSPITAL ENCOUNTER (OUTPATIENT)
Dept: NEUROLOGY | Age: 77
Discharge: HOME | End: 2022-12-13
Attending: PSYCHIATRY & NEUROLOGY
Payer: MEDICARE

## 2022-12-13 DIAGNOSIS — R41.82 ACUTE ALTERATION IN MENTAL STATUS: Primary | ICD-10-CM

## 2022-12-13 DIAGNOSIS — R55 SYNCOPE, UNSPECIFIED SYNCOPE TYPE: ICD-10-CM

## 2022-12-13 DIAGNOSIS — R55 CONVULSIVE SYNCOPE: ICD-10-CM

## 2022-12-13 PROCEDURE — 95714 VEEG EA 12-26 HR UNMNTR: CPT

## 2022-12-13 PROCEDURE — 93271 ECG/MONITORING AND ANALYSIS: CPT

## 2022-12-14 PROBLEM — R55 CONVULSIVE SYNCOPE: Status: ACTIVE | Noted: 2022-12-14

## 2022-12-14 PROBLEM — R41.82 ACUTE ALTERATION IN MENTAL STATUS: Status: ACTIVE | Noted: 2022-12-14

## 2022-12-15 NOTE — PROCEDURES
[address]  EEG    Name:  Guevara Ozuna  MR#:  [id number]  :  1945  ACCOUNT #:  [de-identified]  DATE OF SERVICE:  2022    24-HOUR AMBULATORY EEG RECORDING    DATE OF INTERPRETATION:  2022 to 2022. CLINICAL INDICATION:  The patient is a 41-year-old male with a history of passing out spells. The patient with possible seizures, possible convulsive syncope, possible encephalopathy. EEG to rule out seizures, rule out cortical abnormality. EEG CLASSIFICATION:  Essentially normal ambulatory 24-hour EEG recording. DESCRIPTION OF THE RECORD:  This is a 16-channel prolonged EEG recording for 24 hours on the patient to evaluate for seizures and passing out spells. The study began on 2022 at approximately 09:30 a.m. and ended on 2022 at approximately 10:00 a.m. for a total time of the study about 24-1/2 hours. On this study, the patient did have a posteriorly located occipital alpha rhythm of 8-9 Hz that did attenuate some with eye opening in the awake state. There were no clear areas of focal slowing or spike or spike-and-wave discharges seen in this recording. There was some movement muscle and electrode artifact at times seen in the recording, but overall the study was technically of good quality and interpretable. Again, the patient did enter states of sleep with K complexes and sleep spindles seen in the central head regions during the evening hours. Neither hyperventilation nor photic stimulation was performed. On the patient's clinical diary, the patient recorded on some with activities of headache and normal daily living activities, apparently had one electrode become dislodged by his description and states that he disarmed the whole EEG recording around 3:00 p.m. but the study was of good quality and then on afterwards, I am not quite sure about that note in the history.     INTERPRETATION:  This is an essentially normal ambulatory 24-hour EEG recording as recorded above showing no clear areas of focal slowing. No spike or spike-and-wave discharges and no recorded electrographic spells of any type seen. Clinical correlation is recommended.       Lucien Ryan MD      [id number]  D:  12/14/2022 21:54  T:  12/14/2022 22:41  JOB #:  [id number]  CC:  DO Andrés Roland MD

## 2022-12-19 ENCOUNTER — TELEPHONE (OUTPATIENT)
Dept: NEUROLOGY | Age: 77
End: 2022-12-19

## 2022-12-19 NOTE — TELEPHONE ENCOUNTER
----- Message from Bryant Cade DO sent at 12/15/2022  7:59 PM EST -----  Please let the patient know that his prolonged EEG was normal.  Thanks  ----- Message -----  From: Rajan, Transcription  Sent: 12/15/2022   2:49 PM EST  To: Gloria Guerra DO        Called pt, sister answered phone- did not have [place] in cc to speak to Grace Espinoza. She will call pt to have him call me to give ok to speak to her.

## 2022-12-19 NOTE — TELEPHONE ENCOUNTER
Shin Marin Asper 27 minutes ago (12:31 PM)     JF  Received Hello message, \"Meyr for Ana, I am calling to give permission for you to give my test results to my sister Beltran Briggs. PAO, if you need to reach me I have a block on my phone, while the recording is playing you can press code [phone] [id number] to get through. \"        [phone]            Called sister , Shayy Parra on PHI, advised that  pt's EEG is normal. She verbalized understanding.

## 2023-01-04 ENCOUNTER — TELEPHONE (OUTPATIENT)
Dept: NEUROLOGY | Age: 78
End: 2023-01-04

## 2023-01-04 NOTE — TELEPHONE ENCOUNTER
Called pt, spoke to [place] - per pt ok to speak to. She advised that at the time of pt's appt on 10/31/22 with  pt was not having any pain in his feet and was told to call back if pain developed in his feet. Pt has started to have pain in both feet between arch and heel. It has been going on for past 2 weeks. No changes in medication. No different shoes or any further causes to pain in feet. Next appt on 3/1/23. Advised I will send Rajesh Sanchez a message and call her back. She verbalized understanding.

## 2023-01-06 NOTE — TELEPHONE ENCOUNTER
Message  Received: Rajesh Mayo DO sent to Rose Bergman LPN  Caller: Unspecified (2 days ago,  1:30 PM)  Hi,     Given his age and other medical conditions, please have him start by getting over-the-counter diclofenac (Voltaren) gel and he can place this on his feet up to 4 times a day for local relief. If in 1 month he is not getting any improvement, we can discuss a oral medication. thanks             [facility] on [address] [facility] pt- relayed message above to [facility]. She verbalized it back to me. She verbalized understanding and would call back in 1 month if needed.

## 2023-03-01 ENCOUNTER — OFFICE VISIT (OUTPATIENT)
Dept: NEUROLOGY | Age: 78
End: 2023-03-01
Payer: MEDICARE

## 2023-03-01 VITALS
SYSTOLIC BLOOD PRESSURE: 120 MMHG | BODY MASS INDEX: 24.53 KG/M2 | RESPIRATION RATE: 16 BRPM | OXYGEN SATURATION: 97 % | HEART RATE: 86 BPM | TEMPERATURE: 98.1 F | DIASTOLIC BLOOD PRESSURE: 70 MMHG | WEIGHT: 175.2 LBS | HEIGHT: 71 IN

## 2023-03-01 DIAGNOSIS — M62.838 MUSCLE SPASMS OF BOTH LOWER EXTREMITIES: ICD-10-CM

## 2023-03-01 DIAGNOSIS — G60.8 SENSORY PERIPHERAL NEUROPATHY: Primary | ICD-10-CM

## 2023-03-01 DIAGNOSIS — R55 SYNCOPE, UNSPECIFIED SYNCOPE TYPE: ICD-10-CM

## 2023-03-01 DIAGNOSIS — R25.2 MUSCLE CRAMPING: ICD-10-CM

## 2023-03-01 PROCEDURE — G8420 CALC BMI NORM PARAMETERS: HCPCS | Performed by: NURSE PRACTITIONER

## 2023-03-01 PROCEDURE — G8536 NO DOC ELDER MAL SCRN: HCPCS | Performed by: NURSE PRACTITIONER

## 2023-03-01 PROCEDURE — G8427 DOCREV CUR MEDS BY ELIG CLIN: HCPCS | Performed by: NURSE PRACTITIONER

## 2023-03-01 PROCEDURE — 1123F ACP DISCUSS/DSCN MKR DOCD: CPT | Performed by: NURSE PRACTITIONER

## 2023-03-01 PROCEDURE — 99214 OFFICE O/P EST MOD 30 MIN: CPT | Performed by: NURSE PRACTITIONER

## 2023-03-01 PROCEDURE — 1101F PT FALLS ASSESS-DOCD LE1/YR: CPT | Performed by: NURSE PRACTITIONER

## 2023-03-01 PROCEDURE — G8432 DEP SCR NOT DOC, RNG: HCPCS | Performed by: NURSE PRACTITIONER

## 2023-03-01 RX ORDER — DOCUSATE SODIUM 100 MG/1
100 CAPSULE, LIQUID FILLED ORAL
COMMUNITY

## 2023-03-01 RX ORDER — MINERAL OIL
30 OIL (ML) ORAL DAILY PRN
COMMUNITY

## 2023-03-01 RX ORDER — GABAPENTIN 100 MG/1
100 CAPSULE ORAL 2 TIMES DAILY
Qty: 60 CAPSULE | Refills: 3 | Status: SHIPPED | OUTPATIENT
Start: 2023-03-01

## 2023-03-01 NOTE — PROGRESS NOTES
[facility]  [facility] [address] [place] [address]  Nghia Beltran  Tel: [phone]  Fax: [phone]      Date:  23     Name:  Brad Nam. :  1945  MRN:  [id number]     PCP:  Onofre Mejia MD    Chief Complaint   Patient presents with    Follow-up     Syncope, unspecified syncope type , neuropathy review heart monitor & EEG       HISTORY OF PRESENT ILLNESS:  Patient presents today for peripheral neuropathy follow up. He verbalized he has cramping in the feet in which would tense up and relaxed multiple times. His symptoms started in the past year and half and now getting worse and preventing him from sleeping at night. Patient also so verbalized that he has not been drinking any water. He only drinks sodas. He verbalized his feet continue to feel cold and experienced numbness and tingling. He has been using a cream for relief but was not helpful. He could not tell me exactly the name of the medication. Since last seen at the office, patient has been to the ER on 10/22 for syncope/seizures. He denies any seizures activities but verbalized dizziness from sitting to standing position. Cardiac monitor effort was normal.  EEG ordered by Dr. Gage Velez came back normal.  He denies any falls. Recap from last visit 10/31/22  1. Sensory motor axonal neuropathy     He did have significant serology which was performed which did not provide any clear etiology for his neuropathy. This does fall in the category of idiopathic. We reviewed this again today. His lumbar spine disease does contribute some to his overall symptoms of sensory loss. We did talk about medication that can help with neuropathic pain, but at this time he only has symptoms of numbness. Given this, we will hold off on any medication. He is content with this.   If he does develop neuropathic pain, he will notify me and we can look into starting a medication     Neuropathy:  we reviewed the causes contributing to the neuropathy. We discussed the importance of exercise and activity. I also reviewed the importance of safety with ambulation and ways to prevents falls. I really stressed to him the importance of preventing falls. I did recommend that he uses his hiking stick. We also talked about the importance of trying to increase his daily activity. 2.  Recurrent syncope events:  Given the most recent event did have convulsion associated with it, I will have him obtain a 24-hour EEG. I will also have him wear an event monitor and I did recommend that he have follow-up with his cardiologist.     3. Lumbar spine disease:  He will continue with his stretching and core strengthening. There was no surgical intervention recommended previously      REVIEW OF SYSTEMS:     Review of Systems   HENT:  Positive for hearing loss ([place] worse in the left). Gastrointestinal:  Positive for constipation. Diarrhea: He takes colace daily. Musculoskeletal:  Positive for neck pain (arthritis). Back pain: arthritis. Neurological:  Positive for dizziness and tingling (in the lower extremity bilaterally. ). Endo/Heme/Allergies:  Bruises/bleeds easily (hits his left elbow in the corner). Current Outpatient Medications   Medication Sig    mineral oil liquid Take 30 mL by mouth daily as needed for Constipation. docusate sodium (Colace) 100 mg capsule Take 100 mg by mouth nightly.    gabapentin (NEURONTIN) 100 mg capsule Take 1 Capsule by mouth two (2) times a day. Max Daily Amount: 200 mg.    aspirin delayed-release 81 mg tablet Take 81 mg by mouth daily. metoprolol tartrate (LOPRESSOR) 25 mg tablet Take 25 mg by mouth daily. rosuvastatin (CRESTOR) 5 mg tablet Take 5 mg by mouth as needed.  Mon Wed & Fri    triamcinolone acetonide (KENALOG) 0.1 % topical cream triamcinolone acetonide 0.1 % topical cream   APPLY TO THE AFFECTED AREA(S) TOPICALLY TWICE DAILY     No current facility-administered medications for this visit. Allergies   Allergen Reactions    Adhesive Itching     bandaide causes itching,irritates skin  (9/1/15 - patient states that bandaids only cause irritation if left on for long period.   Tested negative for latex allergy)     Past Medical History:   Diagnosis Date    Arthritis     back,neck,shoulders    Arthritis     CAD (coronary artery disease)     per 10/2011 cardiology note    Cancer ([facility] 10/01/2011    prostate    Cardiomyopathy, alcoholic ([facility]     per 07/5867 cardiology note    Epilepsy ([facility] 10/2022    Essential hypertension     Hypercholesteremia     Hypertension     Neuropathy     Other ill-defined conditions(799.89)     elevated cholesterol    Syncope and collapse      Past Surgical History:   Procedure Laterality Date    COLONOSCOPY N/A 11/8/2016    COLONOSCOPY performed by Fariba Hall MD at [facility] ENDOSCOPY    HX HEENT      tonsillectomy    HX ORTHOPAEDIC      left ankle plate & screws    HX OTHER SURGICAL      colonoscopy    HX PROSTATE SURGERY      HX PROSTATECTOMY  10/19/11    ROBOTIC ASSISTED LAPAROSCOPIC PROSTATECTOMY WITH LEFT PELVIC LYMPH NODE DISSECTION    DE CARDIAC SURG PROCEDURE UNLIST      cardiac cath x2 normal,2003    DE PROSTATE BIOPSY, NEEDLE, SATURATION SAMPLING      08/17/2011     Social History     Socioeconomic History    Marital status: SINGLE     Spouse name: Not on file    Number of children: Not on file    Years of education: Not on file    Highest education level: Not on file   Occupational History    Not on file   Tobacco Use    Smoking status: Never    Smokeless tobacco: Never   Vaping Use    Vaping Use: Never used   Substance and Sexual Activity    Alcohol use: Not Currently    Drug use: Never    Sexual activity: Not Currently   Other Topics Concern    Not on file   Social History Narrative    ** Merged History Encounter **          Social Determinants of Health     Financial Resource Strain: Not on file   Food Insecurity: Not on file Transportation Needs: Not on file   Physical Activity: Not on file   Stress: Not on file   Social Connections: Not on file   Intimate Partner Violence: Not on file   Housing Stability: Not on file     Family History   Problem Relation Age of Onset    Seizures Mother     Stroke Mother     Heart Disease Mother     Cancer Mother          PHYSICAL EXAMINATION:    Visit Vitals  /70 (BP 1 Location: Right upper arm, BP Patient Position: Sitting, BP Cuff Size: Adult)   Pulse 86   Temp 98.1 °F (36.7 °C) (Temporal)   Resp 16   Ht 5' 11\" (1.803 m)   Wt 175 lb 3.2 oz (79.5 kg)   SpO2 97%   BMI 24.44 kg/m²       General:  Well defined, nourished, and well groomed individual in no acute distress. Neck: Supple, nontender, normal range of motion. Musculoskeletal:  Extremities revealed no edema and had full range of motion of joints. Psych:  Good mood and bright affect. NEUROLOGICAL EXAMINATION:     Mental Status:   Alert and oriented to person, place, and time with recent and remote memory intact. Attention span and concentration are normal. Clear speech. Fund of knowledge preserved. Cranial Nerves:   PERRLA. Visual fields were full  EOM: no evidence of nystagmus  Facial sensation:  normal and symmetric  Facial motor: normal and symmetric, no facial droop noted. Hearing : Hard of hearing. Worse in the left  SCM strength intact  Tongue: midline without fasciculations    Motor Examination: Normal tone. 5/5 muscle strength in bilateral upper and lower extremities. No cogwheel rigidity. No muscle wasting, no twitching or fasciculation noted. Sensory exam:  Decreased light touch in the right upper extremity, lower extremity bilaterally. Decreased vibration in the big toe and ankles bilaterally. Coordination:   Finger to nose and rapid arm movement testing was normal.  No resting or intention tremor. Negative Romberg, negative pronator drift.       Gait and Station:  Wide based gait with shuffle while walking. Unable to perform tandem walking. Decreased right arm swing. Reflexes:  DTRs 0 in bilateral biceps, brachioradialis, patella and ankle. No clonus noted. ASSESSMENT AND PLAN      ICD-10-CM ICD-9-CM    1. Sensory peripheral neuropathy  G60.8 356.2 gabapentin (NEURONTIN) 100 mg capsule      2. Muscle cramping  R25.2 729.82       3. Muscle spasms of both lower extremities  M62.838 728.85       4. Syncope, unspecified syncope type  R55 780.2         1. Sensory peripheral neuropathy  Patient verbalized he continues to experience numbness and tingling in the lower extremities bilaterally. EMG done in 2017 which could be related peripheral sensory neuropathy. Patient has tried gabapentin in the past which helped but was stopped. We will start gabapentin to 100 mg at bedtime for 2 weeks and if needed can increase to 100 mg twice a day. Medication side effects were reviewed with patient and family and both verbalized understanding.  -     gabapentin (NEURONTIN) 100 mg capsule; Take 1 Capsule by mouth two (2) times a day. Max Daily Amount: 200 mg., Normal, Disp-60 Capsule, R-3    2. Muscle cramping  Patient has been experiencing cramping in the lower extremities for the past year and a half. It was also noted that patient has not been drinking water and only drinks sodas. It was reinforced to patient that dehydration can play in big wall and muscle cramping and was encouraged to increase water intake and daily exercise by walking 20 minutes 3 times a week to help with the cramping. No intervention needed at this time we will continue to monitor patient for this. 3. Muscle spasms of both lower extremities  For this muscle spasm, we will continue to monitor patient for this and hopeful that he has relief with gabapentin 100 mg.    4. Syncope, unspecified syncope type  Patient verbalized syncope has resolved. Cardiac monitor test came back normal.  EEG also completed and was unremarkable.   He is now experiencing dizziness when changing position from sitting to standing. Patient was advised to come and pause for a few seconds when switching position to prevent dizziness. I it was also reinforced the patient that dehydration can cause dizziness. Patient and/or family verbalized understand of all instructions and all questions/concerns were addressed. Safety/side effects of medications discussed. Patient remains a complex patient secondary to polypharmacy, significant comorbid conditions, and use of high-risk medications which complicate the decision making process related to patient's neurologic diagnosis. We will see the patient back in 6 months, sooner if needed. Jerome Lieberman, NP    Collaborating Provider,  Carisa Valverde [facility]    I have personally evaluated the patient and reviewed the documentation provided by the nurse practitioner, Jerome Lieberman, and we have discussed her findings and the clinical impression. I have formulated with her the proposed management plans for this patient. I agree with the assessment and plan.

## 2023-03-01 NOTE — PROGRESS NOTES
Chief Complaint   Patient presents with    Follow-up     Syncope, unspecified syncope type , neuropathy review heart monitor & EEG     1. Have you been to the ER, urgent care clinic since your last visit? Yes UC skin tear  Hospitalized since your last visit? No     2. Have you seen or consulted any other health care providers outside of the [address] [place] [address] since your last visit? Yes home care for wound . Include any pap smears or colon screening. NO     Patient here with sister Desiree Whipple C/O dizziness at times , legs are tightening and aching at night not able to rest well.

## 2023-03-01 NOTE — PATIENT INSTRUCTIONS
As per discussion,  For the cramps that you have been experiencing at night, we started you back on gabapentin 100 mg at bedtime and needs to be taken for 2 weeks and if needed can take 1 tablet twice a day. I encouraged that you increase your water intake to help with these cramps and dizziness because this could be coming from dehydration.

## 2023-08-30 ENCOUNTER — APPOINTMENT (OUTPATIENT)
Facility: HOSPITAL | Age: 78
End: 2023-08-30
Payer: MEDICARE

## 2023-08-30 ENCOUNTER — HOSPITAL ENCOUNTER (INPATIENT)
Facility: HOSPITAL | Age: 78
LOS: 3 days | Discharge: HOME | End: 2023-09-02
Attending: STUDENT IN AN ORGANIZED HEALTH CARE EDUCATION/TRAINING PROGRAM | Admitting: STUDENT IN AN ORGANIZED HEALTH CARE EDUCATION/TRAINING PROGRAM
Payer: MEDICARE

## 2023-08-30 DIAGNOSIS — N17.9 AKI (ACUTE KIDNEY INJURY) (HCC): ICD-10-CM

## 2023-08-30 DIAGNOSIS — G45.9 TIA (TRANSIENT ISCHEMIC ATTACK): Primary | ICD-10-CM

## 2023-08-30 DIAGNOSIS — A41.9 SEVERE SEPSIS (HCC): ICD-10-CM

## 2023-08-30 DIAGNOSIS — S50.319A ABRASION OF ELBOW, UNSPECIFIED LATERALITY, INITIAL ENCOUNTER: ICD-10-CM

## 2023-08-30 DIAGNOSIS — R65.20 SEVERE SEPSIS (HCC): ICD-10-CM

## 2023-08-30 DIAGNOSIS — E87.1 HYPONATREMIA: ICD-10-CM

## 2023-08-30 DIAGNOSIS — I65.23 BILATERAL CAROTID ARTERY STENOSIS: ICD-10-CM

## 2023-08-30 DIAGNOSIS — S20.219A CONTUSION OF CHEST WALL, UNSPECIFIED LATERALITY, INITIAL ENCOUNTER: ICD-10-CM

## 2023-08-30 DIAGNOSIS — I67.89 CEREBRAL MICROVASCULAR DISEASE: ICD-10-CM

## 2023-08-30 LAB
ALBUMIN SERPL-MCNC: 3.6 G/DL (ref 3.5–5)
ALBUMIN/GLOB SERPL: 1 (ref 1.1–2.2)
ALP SERPL-CCNC: 70 U/L (ref 45–117)
ALT SERPL-CCNC: 139 U/L (ref 12–78)
ANION GAP BLD CALC-SCNC: 12 (ref 10–20)
ANION GAP SERPL CALC-SCNC: 13 MMOL/L (ref 5–15)
APPEARANCE UR: CLEAR
AST SERPL-CCNC: 489 U/L (ref 15–37)
BACTERIA URNS QL MICRO: NEGATIVE /HPF
BASE DEFICIT BLD-SCNC: 6.9 MMOL/L
BASOPHILS # BLD: 0 K/UL (ref 0–0.1)
BASOPHILS NFR BLD: 0 % (ref 0–1)
BILIRUB SERPL-MCNC: 0.9 MG/DL (ref 0.2–1)
BILIRUB UR QL: NEGATIVE
BUN SERPL-MCNC: 26 MG/DL (ref 6–20)
BUN/CREAT SERPL: 17 (ref 12–20)
CA-I BLD-MCNC: 1.12 MMOL/L (ref 1.12–1.32)
CALCIUM SERPL-MCNC: 8.8 MG/DL (ref 8.5–10.1)
CHLORIDE BLD-SCNC: 91 MMOL/L (ref 100–108)
CHLORIDE SERPL-SCNC: 91 MMOL/L (ref 97–108)
CK SERPL-CCNC: ABNORMAL U/L (ref 39–308)
CO2 BLD-SCNC: 18 MMOL/L (ref 19–24)
CO2 SERPL-SCNC: 17 MMOL/L (ref 21–32)
COLOR UR: ABNORMAL
CREAT SERPL-MCNC: 1.55 MG/DL (ref 0.7–1.3)
CREAT UR-MCNC: 0.9 MG/DL (ref 0.6–1.3)
DIFFERENTIAL METHOD BLD: ABNORMAL
EKG ATRIAL RATE: 83 BPM
EKG DIAGNOSIS: NORMAL
EKG P AXIS: 7 DEGREES
EKG P-R INTERVAL: 142 MS
EKG Q-T INTERVAL: 436 MS
EKG QRS DURATION: 142 MS
EKG QTC CALCULATION (BAZETT): 512 MS
EKG R AXIS: 1 DEGREES
EKG T AXIS: 116 DEGREES
EKG VENTRICULAR RATE: 83 BPM
EOSINOPHIL # BLD: 0 K/UL (ref 0–0.4)
EOSINOPHIL NFR BLD: 0 % (ref 0–7)
EPITH CASTS URNS QL MICRO: ABNORMAL /LPF
ERYTHROCYTE [DISTWIDTH] IN BLOOD BY AUTOMATED COUNT: 15.7 % (ref 11.5–14.5)
GLOBULIN SER CALC-MCNC: 3.7 G/DL (ref 2–4)
GLUCOSE BLD STRIP.AUTO-MCNC: 116 MG/DL (ref 74–106)
GLUCOSE BLD STRIP.AUTO-MCNC: 157 MG/DL (ref 65–117)
GLUCOSE SERPL-MCNC: 155 MG/DL (ref 65–100)
GLUCOSE UR STRIP.AUTO-MCNC: NEGATIVE MG/DL
GRAN CASTS URNS QL MICRO: ABNORMAL /LPF
HCO3 BLDA-SCNC: 18 MMOL/L
HCT VFR BLD AUTO: 40.7 % (ref 36.6–50.3)
HGB BLD-MCNC: 14.2 G/DL (ref 12.1–17)
HGB UR QL STRIP: ABNORMAL
HYALINE CASTS URNS QL MICRO: ABNORMAL /LPF (ref 0–5)
IMM GRANULOCYTES # BLD AUTO: 0 K/UL (ref 0–0.04)
IMM GRANULOCYTES NFR BLD AUTO: 0 % (ref 0–0.5)
INR PPP: 1 (ref 0.9–1.1)
KETONES UR QL STRIP.AUTO: 15 MG/DL
LACTATE BLD-SCNC: 1.58 MMOL/L (ref 0.4–2)
LACTATE BLD-SCNC: 2.34 MMOL/L (ref 0.4–2)
LACTATE BLD-SCNC: 3.88 MMOL/L (ref 0.4–2)
LEUKOCYTE ESTERASE UR QL STRIP.AUTO: NEGATIVE
LYMPHOCYTES # BLD: 1.3 K/UL (ref 0.8–3.5)
LYMPHOCYTES NFR BLD: 6 % (ref 12–49)
MCH RBC QN AUTO: 32.1 PG (ref 26–34)
MCHC RBC AUTO-ENTMCNC: 34.9 G/DL (ref 30–36.5)
MCV RBC AUTO: 92.1 FL (ref 80–99)
MONOCYTES # BLD: 0.8 K/UL (ref 0–1)
MONOCYTES NFR BLD: 4 % (ref 5–13)
MUCOUS THREADS URNS QL MICRO: ABNORMAL /LPF
NEUTS SEG # BLD: 18.8 K/UL (ref 1.8–8)
NEUTS SEG NFR BLD: 90 % (ref 32–75)
NITRITE UR QL STRIP.AUTO: NEGATIVE
NRBC # BLD: 0.02 K/UL (ref 0–0.01)
NRBC BLD-RTO: 0.1 PER 100 WBC
PCO2 BLDV: 32.9 MMHG (ref 41–51)
PH BLDV: 7.34 (ref 7.32–7.42)
PH UR STRIP: 6.5 (ref 5–8)
PLATELET # BLD AUTO: 192 K/UL (ref 150–400)
PMV BLD AUTO: 10 FL (ref 8.9–12.9)
PO2 BLDV: 40 MMHG (ref 25–40)
POTASSIUM BLD-SCNC: 4.7 MMOL/L (ref 3.5–5.5)
POTASSIUM SERPL-SCNC: 4.3 MMOL/L (ref 3.5–5.1)
PROCALCITONIN SERPL-MCNC: 3.3 NG/ML
PROT SERPL-MCNC: 7.3 G/DL (ref 6.4–8.2)
PROT UR STRIP-MCNC: 100 MG/DL
PROTHROMBIN TIME: 10.9 SEC (ref 9–11.1)
RBC # BLD AUTO: 4.42 M/UL (ref 4.1–5.7)
RBC #/AREA URNS HPF: ABNORMAL /HPF (ref 0–5)
RBC MORPH BLD: ABNORMAL
SAO2 % BLD: 73 %
SERVICE CMNT-IMP: ABNORMAL
SERVICE CMNT-IMP: ABNORMAL
SODIUM BLD-SCNC: 121 MMOL/L (ref 136–145)
SODIUM SERPL-SCNC: 121 MMOL/L (ref 136–145)
SODIUM SERPL-SCNC: 125 MMOL/L (ref 136–145)
SP GR UR REFRACTOMETRY: 1.01 (ref 1–1.03)
SPECIMEN SITE: ABNORMAL
TROPONIN I SERPL HS-MCNC: 360 NG/L (ref 0–76)
TROPONIN I SERPL HS-MCNC: 452 NG/L (ref 0–76)
TROPONIN I SERPL HS-MCNC: 769 NG/L (ref 0–76)
TROPONIN I SERPL HS-MCNC: 772 NG/L (ref 0–76)
URINE CULTURE IF INDICATED: ABNORMAL
UROBILINOGEN UR QL STRIP.AUTO: 0.2 EU/DL (ref 0.2–1)
WBC # BLD AUTO: 20.9 K/UL (ref 4.1–11.1)
WBC URNS QL MICRO: ABNORMAL /HPF (ref 0–4)

## 2023-08-30 PROCEDURE — 2060000000 HC ICU INTERMEDIATE R&B

## 2023-08-30 PROCEDURE — 73100 X-RAY EXAM OF WRIST: CPT

## 2023-08-30 PROCEDURE — 73562 X-RAY EXAM OF KNEE 3: CPT

## 2023-08-30 PROCEDURE — 85025 COMPLETE CBC W/AUTO DIFF WBC: CPT

## 2023-08-30 PROCEDURE — 70450 CT HEAD/BRAIN W/O DYE: CPT

## 2023-08-30 PROCEDURE — 73070 X-RAY EXAM OF ELBOW: CPT

## 2023-08-30 PROCEDURE — 80053 COMPREHEN METABOLIC PANEL: CPT

## 2023-08-30 PROCEDURE — 84484 ASSAY OF TROPONIN QUANT: CPT

## 2023-08-30 PROCEDURE — 95816 EEG AWAKE AND DROWSY: CPT

## 2023-08-30 PROCEDURE — 99285 EMERGENCY DEPT VISIT HI MDM: CPT

## 2023-08-30 PROCEDURE — 83935 ASSAY OF URINE OSMOLALITY: CPT

## 2023-08-30 PROCEDURE — 71275 CT ANGIOGRAPHY CHEST: CPT

## 2023-08-30 PROCEDURE — 2580000003 HC RX 258: Performed by: STUDENT IN AN ORGANIZED HEALTH CARE EDUCATION/TRAINING PROGRAM

## 2023-08-30 PROCEDURE — 82550 ASSAY OF CK (CPK): CPT

## 2023-08-30 PROCEDURE — 82947 ASSAY GLUCOSE BLOOD QUANT: CPT

## 2023-08-30 PROCEDURE — 84132 ASSAY OF SERUM POTASSIUM: CPT

## 2023-08-30 PROCEDURE — 81001 URINALYSIS AUTO W/SCOPE: CPT

## 2023-08-30 PROCEDURE — 6370000000 HC RX 637 (ALT 250 FOR IP): Performed by: STUDENT IN AN ORGANIZED HEALTH CARE EDUCATION/TRAINING PROGRAM

## 2023-08-30 PROCEDURE — 6360000004 HC RX CONTRAST MEDICATION: Performed by: STUDENT IN AN ORGANIZED HEALTH CARE EDUCATION/TRAINING PROGRAM

## 2023-08-30 PROCEDURE — 82962 GLUCOSE BLOOD TEST: CPT

## 2023-08-30 PROCEDURE — 6360000002 HC RX W HCPCS: Performed by: STUDENT IN AN ORGANIZED HEALTH CARE EDUCATION/TRAINING PROGRAM

## 2023-08-30 PROCEDURE — 36415 COLL VENOUS BLD VENIPUNCTURE: CPT

## 2023-08-30 PROCEDURE — 70498 CT ANGIOGRAPHY NECK: CPT

## 2023-08-30 PROCEDURE — 4A03X5D MEASUREMENT OF ARTERIAL FLOW, INTRACRANIAL, EXTERNAL APPROACH: ICD-10-PCS | Performed by: STUDENT IN AN ORGANIZED HEALTH CARE EDUCATION/TRAINING PROGRAM

## 2023-08-30 PROCEDURE — 74177 CT ABD & PELVIS W/CONTRAST: CPT

## 2023-08-30 PROCEDURE — 93005 ELECTROCARDIOGRAM TRACING: CPT | Performed by: STUDENT IN AN ORGANIZED HEALTH CARE EDUCATION/TRAINING PROGRAM

## 2023-08-30 PROCEDURE — 85610 PROTHROMBIN TIME: CPT

## 2023-08-30 PROCEDURE — 84300 ASSAY OF URINE SODIUM: CPT

## 2023-08-30 PROCEDURE — 82330 ASSAY OF CALCIUM: CPT

## 2023-08-30 PROCEDURE — 96374 THER/PROPH/DIAG INJ IV PUSH: CPT

## 2023-08-30 PROCEDURE — 84145 PROCALCITONIN (PCT): CPT

## 2023-08-30 PROCEDURE — 87040 BLOOD CULTURE FOR BACTERIA: CPT

## 2023-08-30 PROCEDURE — 82803 BLOOD GASES ANY COMBINATION: CPT

## 2023-08-30 PROCEDURE — 0042T CT BRAIN PERFUSION: CPT

## 2023-08-30 PROCEDURE — 83605 ASSAY OF LACTIC ACID: CPT

## 2023-08-30 PROCEDURE — 84295 ASSAY OF SERUM SODIUM: CPT

## 2023-08-30 RX ORDER — POLYETHYLENE GLYCOL 3350 17 G/17G
17 POWDER, FOR SOLUTION ORAL DAILY PRN
Status: DISCONTINUED | OUTPATIENT
Start: 2023-08-30 | End: 2023-09-02 | Stop reason: HOSPADM

## 2023-08-30 RX ORDER — ENOXAPARIN SODIUM 100 MG/ML
40 INJECTION SUBCUTANEOUS DAILY
Status: DISCONTINUED | OUTPATIENT
Start: 2023-08-30 | End: 2023-09-02 | Stop reason: HOSPADM

## 2023-08-30 RX ORDER — ACETAMINOPHEN 325 MG/1
650 TABLET ORAL EVERY 4 HOURS PRN
Status: DISCONTINUED | OUTPATIENT
Start: 2023-08-30 | End: 2023-09-02 | Stop reason: HOSPADM

## 2023-08-30 RX ORDER — SODIUM CHLORIDE 0.9 % (FLUSH) 0.9 %
5-40 SYRINGE (ML) INJECTION EVERY 12 HOURS SCHEDULED
Status: DISCONTINUED | OUTPATIENT
Start: 2023-08-30 | End: 2023-09-02 | Stop reason: HOSPADM

## 2023-08-30 RX ORDER — ACETAMINOPHEN 500 MG
1000 TABLET ORAL
Status: COMPLETED | OUTPATIENT
Start: 2023-08-30 | End: 2023-08-30

## 2023-08-30 RX ORDER — LABETALOL HYDROCHLORIDE 5 MG/ML
10 INJECTION, SOLUTION INTRAVENOUS EVERY 10 MIN PRN
Status: DISCONTINUED | OUTPATIENT
Start: 2023-08-30 | End: 2023-09-02 | Stop reason: HOSPADM

## 2023-08-30 RX ORDER — CASTOR OIL AND BALSAM, PERU 788; 87 MG/G; MG/G
OINTMENT TOPICAL 2 TIMES DAILY
Status: DISCONTINUED | OUTPATIENT
Start: 2023-08-30 | End: 2023-09-02 | Stop reason: HOSPADM

## 2023-08-30 RX ORDER — ONDANSETRON 4 MG/1
4 TABLET, ORALLY DISINTEGRATING ORAL EVERY 8 HOURS PRN
Status: DISCONTINUED | OUTPATIENT
Start: 2023-08-30 | End: 2023-09-02 | Stop reason: HOSPADM

## 2023-08-30 RX ORDER — SODIUM CHLORIDE 9 MG/ML
INJECTION, SOLUTION INTRAVENOUS CONTINUOUS
Status: DISCONTINUED | OUTPATIENT
Start: 2023-08-30 | End: 2023-09-01

## 2023-08-30 RX ORDER — ATORVASTATIN CALCIUM 40 MG/1
40 TABLET, FILM COATED ORAL NIGHTLY
Status: DISCONTINUED | OUTPATIENT
Start: 2023-08-30 | End: 2023-09-02 | Stop reason: HOSPADM

## 2023-08-30 RX ORDER — ASPIRIN 300 MG/1
300 SUPPOSITORY RECTAL DAILY
Status: DISCONTINUED | OUTPATIENT
Start: 2023-08-31 | End: 2023-08-30

## 2023-08-30 RX ORDER — METOPROLOL SUCCINATE 25 MG/1
25 TABLET, EXTENDED RELEASE ORAL DAILY
Status: DISCONTINUED | OUTPATIENT
Start: 2023-08-30 | End: 2023-09-02 | Stop reason: HOSPADM

## 2023-08-30 RX ORDER — 0.9 % SODIUM CHLORIDE 0.9 %
1000 INTRAVENOUS SOLUTION INTRAVENOUS ONCE
Status: COMPLETED | OUTPATIENT
Start: 2023-08-30 | End: 2023-08-30

## 2023-08-30 RX ORDER — SODIUM CHLORIDE 0.9 % (FLUSH) 0.9 %
5-40 SYRINGE (ML) INJECTION PRN
Status: DISCONTINUED | OUTPATIENT
Start: 2023-08-30 | End: 2023-09-02 | Stop reason: HOSPADM

## 2023-08-30 RX ORDER — ONDANSETRON 2 MG/ML
4 INJECTION INTRAMUSCULAR; INTRAVENOUS EVERY 6 HOURS PRN
Status: DISCONTINUED | OUTPATIENT
Start: 2023-08-30 | End: 2023-08-30

## 2023-08-30 RX ORDER — ASPIRIN 81 MG/1
81 TABLET, CHEWABLE ORAL DAILY
Status: DISCONTINUED | OUTPATIENT
Start: 2023-08-31 | End: 2023-08-30

## 2023-08-30 RX ORDER — ASPIRIN 81 MG/1
81 TABLET ORAL DAILY
Status: DISCONTINUED | OUTPATIENT
Start: 2023-08-30 | End: 2023-09-02 | Stop reason: HOSPADM

## 2023-08-30 RX ORDER — GABAPENTIN 100 MG/1
100 CAPSULE ORAL 2 TIMES DAILY
Status: DISCONTINUED | OUTPATIENT
Start: 2023-08-30 | End: 2023-09-02 | Stop reason: HOSPADM

## 2023-08-30 RX ORDER — ONDANSETRON 2 MG/ML
4 INJECTION INTRAMUSCULAR; INTRAVENOUS EVERY 6 HOURS PRN
Status: DISCONTINUED | OUTPATIENT
Start: 2023-08-30 | End: 2023-09-02 | Stop reason: HOSPADM

## 2023-08-30 RX ORDER — 0.9 % SODIUM CHLORIDE 0.9 %
30 INTRAVENOUS SOLUTION INTRAVENOUS ONCE
Status: DISCONTINUED | OUTPATIENT
Start: 2023-08-30 | End: 2023-08-30

## 2023-08-30 RX ORDER — SODIUM CHLORIDE 9 MG/ML
INJECTION, SOLUTION INTRAVENOUS PRN
Status: DISCONTINUED | OUTPATIENT
Start: 2023-08-30 | End: 2023-09-01 | Stop reason: SDUPTHER

## 2023-08-30 RX ADMIN — Medication: at 23:46

## 2023-08-30 RX ADMIN — IOPAMIDOL 100 ML: 755 INJECTION, SOLUTION INTRAVENOUS at 06:33

## 2023-08-30 RX ADMIN — SODIUM CHLORIDE: 9 INJECTION, SOLUTION INTRAVENOUS at 17:07

## 2023-08-30 RX ADMIN — GABAPENTIN 100 MG: 100 CAPSULE ORAL at 23:17

## 2023-08-30 RX ADMIN — ACETAMINOPHEN 650 MG: 325 TABLET ORAL at 23:17

## 2023-08-30 RX ADMIN — ASPIRIN 81 MG: 81 TABLET, COATED ORAL at 17:31

## 2023-08-30 RX ADMIN — ACETAMINOPHEN 1000 MG: 500 TABLET ORAL at 11:00

## 2023-08-30 RX ADMIN — SODIUM CHLORIDE 1000 ML: 9 INJECTION, SOLUTION INTRAVENOUS at 08:40

## 2023-08-30 RX ADMIN — ENOXAPARIN SODIUM 40 MG: 100 INJECTION SUBCUTANEOUS at 17:32

## 2023-08-30 RX ADMIN — SODIUM CHLORIDE, PRESERVATIVE FREE 5 ML: 5 INJECTION INTRAVENOUS at 23:47

## 2023-08-30 RX ADMIN — PIPERACILLIN AND TAZOBACTAM 3375 MG: 3; .375 INJECTION, POWDER, LYOPHILIZED, FOR SOLUTION INTRAVENOUS at 08:49

## 2023-08-30 ASSESSMENT — PAIN SCALES - GENERAL
PAINLEVEL_OUTOF10: 8
PAINLEVEL_OUTOF10: 9
PAINLEVEL_OUTOF10: 3
PAINLEVEL_OUTOF10: 0

## 2023-08-30 ASSESSMENT — PAIN DESCRIPTION - DESCRIPTORS
DESCRIPTORS: ACHING
DESCRIPTORS: ACHING;SORE

## 2023-08-30 ASSESSMENT — PAIN DESCRIPTION - FREQUENCY: FREQUENCY: CONTINUOUS

## 2023-08-30 ASSESSMENT — PAIN DESCRIPTION - ONSET: ONSET: ON-GOING

## 2023-08-30 ASSESSMENT — PAIN DESCRIPTION - PAIN TYPE: TYPE: ACUTE PAIN

## 2023-08-30 ASSESSMENT — PAIN - FUNCTIONAL ASSESSMENT
PAIN_FUNCTIONAL_ASSESSMENT: PREVENTS OR INTERFERES SOME ACTIVE ACTIVITIES AND ADLS
PAIN_FUNCTIONAL_ASSESSMENT: 0-10

## 2023-08-30 ASSESSMENT — PAIN DESCRIPTION - ORIENTATION: ORIENTATION: RIGHT;LEFT;ANTERIOR;MID

## 2023-08-30 NOTE — H&P
PERFUSION: There are no regional areas of elevated Tmax, decreased cerebral blood flow or blood volume. RCBF < 30% = 0 cc. Tmax > 6 seconds = 0 cc. Mismatch volume = 0 cc. 1.  No acute process. No large vessel occlusion, arterial dissection, or flow-limiting stenosis. 50% stenosis left ICA origin. 25% stenosis right ICA origin. 2.  No perfusion abnormality. _______________________________________________________________________    TOTAL TIME:  76 Minutes    Critical Care Provided     Minutes non procedure based    Signed: Cain Angelucci, MD    Procedures: see electronic medical records for all procedures/Xrays and details which were not copied into this note but were reviewed prior to creation of Plan.

## 2023-08-30 NOTE — ED NOTES
Consult sent to Dr. Cora Lucas via [employer]. 1543: Consult called to cardiology at this time.        Johnathan Swartz RN  08/30/23 SADA Hartman  08/30/23 1787

## 2023-08-30 NOTE — PROGRESS NOTES
End of Shift Note    Bedside shift change report given to Gabi TOMLIN (oncoming nurse) by Yeni Fleming RN (offgoing nurse). Report included the following information SBAR    Shift worked:  3pm-7pm     Shift summary and any significant changes:     Transfer from ED at 1630 with multiple skin tear dressing change has soft BP Dr Vinnie Oliveros cardiologist want me t hoed his Metoprolol     Concerns for physician to address:  None      Zone phone for oncoming shift:          Activity:     Number times ambulated in hallways past shift: 0  Number of times OOB to chair past shift: 0    Cardiac:   Cardiac Monitoring: Yes           Access:  Current line(s): PIV     Genitourinary:   Urinary status: external catheter    Respiratory:      Chronic home O2 use?: NO  Incentive spirometer at bedside: NO       GI:     Current diet:  ADULT DIET;  Regular  Passing flatus: YES  Tolerating current diet: YES       Pain Management:   Patient states pain is manageable on current regimen: YES    Skin:     Interventions: float heels    Patient Safety:  Fall Score:    Interventions: bed/chair alarm       Length of Stay:  Expected LOS: 3  Actual LOS: 0      Yeni Fleming, RN

## 2023-08-30 NOTE — CONSULTS
I was asked to evaluate for potential ICU admission. I have reviewed the ED records and interviewed the patient. He was speaking coherently on the phone when I arrived to his room and he provides a coherent story of how he ended up in the ED [facility] ED. He has extensive contusions/ecchymoses and skin tears but scans from head to pelvis revealed no major injuries. His Na is 121 but he is asymptomatic with regard to this. He is hemodynamically stable and comfortable on NC O2. I do not see a reason for admission to the ICU level of care. HM Service has been called for admission.  [facility] Service will remain available as needed    Zee Zaragoza, 3 [facility]  [phone]  8/30/2023

## 2023-08-30 NOTE — PROGRESS NOTES
MRI PENDING    Completion of MRI Screening Sheet    Fax to [phone] when completed    Please call [phone]  When this is done    Thank You

## 2023-08-30 NOTE — CONSULTS
IP Cardiology Consult       Date of consult:  08/30/23  Date of admission: 8/30/2023  Primary Cardiologist: Dr Sarahy Florez   Physician Requesting consult: Dr Cookie Sterling       Assessment:    Episode of fall /?syncope   Rhabdomyolysis   NSTEMI type 2   Hyponatremia   Slurred speech? TIA/CVA - neuro eval   Alcohol use     Problem list:   - Cath 2003 mild CAD, stress 2021 no ischemia  - non-ischemic Cardiomyopathy Echo 2021 EF 45%, HARMONY 8/2022 mild MR  - LBBB, holter 5/2021 no sig arrhythmia, EVR 12/2022 no sig arrhyhtmia  - Syncope 2020, beta blocker stopped at [address], evaluated by neurology  - HTN  - Dyslipidemia  - Low sodium  - Mild anemia, low plt 123  - Shortness of breath being evaluated by pulmonary  - IBS  - [place]  - Prostate CA  - Cellulitis 8/2022   - EtOH abuse 5-6 beers daily, quit 4/2022  - No tobacco  Single, no kids, retired  for [employer], uses a cane  here with sister      Trop 452 < 769   Na 121   LA 2.34 <3.88  Creat 1.55  [place] [address]  CTA chest - Anterior , midline, subcutaneous, chest wall contusion. CT head okay      Recommendations:      Trop elevation is likely type 2 in the setting of Rhabdo (elevated CK), dehydration, contusion- no ischemic eval needed for now   MRI ordered pending   IVF and monitor BMP   Monitor on tele   Hold metoprolol for now  Check orthostatic blood pressures (if able to stand up)   Echocardiogram is pending   Discussed about avoiding alcohol    Thank you for this consult and allowing me to take part in this patients care. Please call with questions.     Will d//w Dr Sarahy Florez who will follow up with patient         [x]        High complexity decision making was performed      CC / Reason for consult: Fall/ syncope / trop elevation     History of the presenting illness:  Ellis Foreman. is a 65 y.o. male with past medical history of hypertension, hyperlipidemia, left bundle branch block, nonobstructive CAD, history of syncope presented to emergency room after

## 2023-08-30 NOTE — ED PROVIDER NOTES
[address] ENCOUNTER       Pt Name: Jackelin Peralta. MRN: [id number]  Birthdate 1945  Date of evaluation: 8/30/2023  Provider: Juanita Oneill MD   PCP: Juice Yanez MD  Note Started: 6:28 AM EDT 8/30/23     CHIEF COMPLAINT       Chief Complaint   Patient presents with    Aphasia     Pt brought in by EMS after neighbor found pt in yard ~0530. Pt advises he was working on his car in the garage yesterday but that is all he remembers. Pt arrives with aphasia, generalized weakness, and right sided facial droop. Pt's left elbow bleeding and pt unsure if he fell. On ASA. HISTORY OF PRESENT ILLNESS: 1 or more elements      History From: patient, History limited by: Douglas Hay. is a 65 y.o. male presenting with fall. History of bit limited as patient does not remember the entire story. Notes he was working on his car and he did have a fall. He has been weak and crawling around in his yard since that time. Eventually his neighbor saw him this morning and called 911. Patient notes he feels generally lousy but unable to expound upon that. Please See MDM for Additional Details of the HPI/PMH  Nursing Notes were all reviewed and agreed with or any disagreements were addressed in the HPI. REVIEW OF SYSTEMS        Positives and Pertinent negatives as per HPI.     PAST HISTORY     Past Medical History:  Past Medical History:   Diagnosis Date    Arthritis     back,neck,shoulders    Arthritis     CAD (coronary artery disease)     per 10/2011 cardiology note    Cancer ([address]) 10/01/2011    prostate    Cardiomyopathy, alcoholic ([address])     per 42/9996 cardiology note    Epilepsy ([address]) 10/2022    Essential hypertension     Hypercholesteremia     Hypertension     Neuropathy     Other ill-defined conditions(799.89)     elevated cholesterol    Syncope and collapse        Past Surgical History:  Past Surgical History:   Procedure Laterality Date unspecified laterality, initial encounter          DISPOSITION/PLAN   Kt Andrew Jr.'s  results have been reviewed with him. CLINICAL IMPRESSION    Admit Note: Pt is being admitted by Dr. Arie Grant. The results of their tests and reason(s) for their admission have been discussed with pt and/or available family. They convey agreement and understanding for the need to be admitted and for the admission diagnosis. PATIENT REFERRED TO:  No follow-up provider specified. DISCHARGE MEDICATIONS:     Medication List        ASK your doctor about these medications      aspirin 81 MG EC tablet     docusate 100 MG Caps  Commonly known as: COLACE, DULCOLAX     gabapentin 100 MG capsule  Commonly known as: NEURONTIN     metoprolol tartrate 25 MG tablet  Commonly known as: LOPRESSOR     rosuvastatin 5 MG tablet  Commonly known as: CRESTOR     triamcinolone 0.1 % cream  Commonly known as: KENALOG                DISCONTINUED MEDICATIONS:  Current Discharge Medication List          I am the Primary Clinician of Record. Manisha Hernández MD (electronically signed)    (Please note that parts of this dictation were completed with voice recognition software. Quite often unanticipated grammatical, syntax, homophones, and other interpretive errors are inadvertently transcribed by the computer software. Please disregards these errors.  Please excuse any errors that have escaped final proofreading.)         Manisha Hernández MD  08/30/23 3662

## 2023-08-30 NOTE — ED NOTES
1114: Pt given a whole bed bath with wash cloths and CHG wipes. Pt scattered with abrasions and skin tears, mostly on bilateral forearms/elbows. New bandages applied with nonstick and wrapped with coban bilaterally. Pt has all over body scattered ecchymosis. 1116: Pt family at bedside. 1534: EEG at bedside. Will collect new orders once EEG completed.       Douglas East RN  08/30/23 1534

## 2023-08-30 NOTE — ED NOTES
Provider at the bedside at this time talking to the patient/family. Walked urine specimens down to the lab at this time.          Ramirez Caraballo RN  08/30/23 0499

## 2023-08-30 NOTE — ED NOTES
Report given to Amirah Caro and [address] [place] [address], SADA. Nurse was informed of reason for arrival, vitals, labs, medications, orders, procedures, results, anything left pending and current plan of action. Questions were asked and received prior to departure from the patient.        Ppaito Diggs RN  08/30/23 5031

## 2023-08-31 ENCOUNTER — APPOINTMENT (OUTPATIENT)
Facility: HOSPITAL | Age: 78
End: 2023-08-31
Attending: STUDENT IN AN ORGANIZED HEALTH CARE EDUCATION/TRAINING PROGRAM
Payer: MEDICARE

## 2023-08-31 ENCOUNTER — APPOINTMENT (OUTPATIENT)
Facility: HOSPITAL | Age: 78
End: 2023-08-31
Payer: MEDICARE

## 2023-08-31 PROBLEM — R53.1 GENERAL WEAKNESS: Status: ACTIVE | Noted: 2023-08-31

## 2023-08-31 PROBLEM — R41.82 ACUTE ALTERATION IN MENTAL STATUS: Status: ACTIVE | Noted: 2022-12-14

## 2023-08-31 PROBLEM — R55 CONVULSIVE SYNCOPE: Status: ACTIVE | Noted: 2022-12-14

## 2023-08-31 PROBLEM — I65.23 BILATERAL CAROTID ARTERY STENOSIS: Status: ACTIVE | Noted: 2023-08-31

## 2023-08-31 PROBLEM — I67.89 CEREBRAL MICROVASCULAR DISEASE: Status: ACTIVE | Noted: 2023-08-31

## 2023-08-31 LAB
CHOLEST SERPL-MCNC: 101 MG/DL
CK SERPL-CCNC: ABNORMAL U/L (ref 39–308)
ECHO AO ROOT DIAM: 2.9 CM
ECHO AO ROOT INDEX: 1.43 CM/M2
ECHO AR MAX VEL PISA: 3.7 M/S
ECHO AV AREA PEAK VELOCITY: 3.3 CM2
ECHO AV AREA/BSA PEAK VELOCITY: 1.6 CM2/M2
ECHO AV PEAK GRADIENT: 9 MMHG
ECHO AV PEAK VELOCITY: 1.5 M/S
ECHO AV REGURGITANT PHT: 619 MILLISECOND
ECHO AV VELOCITY RATIO: 0.8
ECHO BSA: 2.03 M2
ECHO LA DIAMETER INDEX: 1.53 CM/M2
ECHO LA DIAMETER: 3.1 CM
ECHO LA TO AORTIC ROOT RATIO: 1.07
ECHO LV FRACTIONAL SHORTENING: 17 % (ref 28–44)
ECHO LV INTERNAL DIMENSION DIASTOLE INDEX: 2.32 CM/M2
ECHO LV INTERNAL DIMENSION DIASTOLIC: 4.7 CM (ref 4.2–5.9)
ECHO LV INTERNAL DIMENSION SYSTOLIC INDEX: 1.92 CM/M2
ECHO LV INTERNAL DIMENSION SYSTOLIC: 3.9 CM
ECHO LV IVSD: 1.4 CM (ref 0.6–1)
ECHO LV MASS 2D: 224.8 G (ref 88–224)
ECHO LV MASS INDEX 2D: 110.7 G/M2 (ref 49–115)
ECHO LV POSTERIOR WALL DIASTOLIC: 1.1 CM (ref 0.6–1)
ECHO LV RELATIVE WALL THICKNESS RATIO: 0.47
ECHO LVOT AREA: 4.2 CM2
ECHO LVOT DIAM: 2.3 CM
ECHO LVOT PEAK GRADIENT: 6 MMHG
ECHO LVOT PEAK VELOCITY: 1.2 M/S
ECHO TV REGURGITANT MAX VELOCITY: 3.04 M/S
ECHO TV REGURGITANT PEAK GRADIENT: 37 MMHG
ERYTHROCYTE [DISTWIDTH] IN BLOOD BY AUTOMATED COUNT: 15.9 % (ref 11.5–14.5)
EST. AVERAGE GLUCOSE BLD GHB EST-MCNC: 114 MG/DL
HBA1C MFR BLD: 5.6 % (ref 4–5.6)
HCT VFR BLD AUTO: 31.6 % (ref 36.6–50.3)
HDLC SERPL-MCNC: 47 MG/DL
HDLC SERPL: 2.1 (ref 0–5)
HGB BLD-MCNC: 11.2 G/DL (ref 12.1–17)
LDLC SERPL CALC-MCNC: 30.2 MG/DL (ref 0–100)
MCH RBC QN AUTO: 32.3 PG (ref 26–34)
MCHC RBC AUTO-ENTMCNC: 35.4 G/DL (ref 30–36.5)
MCV RBC AUTO: 91.1 FL (ref 80–99)
NRBC # BLD: 0 K/UL (ref 0–0.01)
NRBC BLD-RTO: 0 PER 100 WBC
OSMOLALITY UR: 490 MOSM/KG H2O
PLATELET # BLD AUTO: 118 K/UL (ref 150–400)
PMV BLD AUTO: 9.7 FL (ref 8.9–12.9)
RBC # BLD AUTO: 3.47 M/UL (ref 4.1–5.7)
SODIUM SERPL-SCNC: 128 MMOL/L (ref 136–145)
SODIUM SERPL-SCNC: 129 MMOL/L (ref 136–145)
SODIUM UR-SCNC: 11 MMOL/L
TRIGL SERPL-MCNC: 119 MG/DL
VLDLC SERPL CALC-MCNC: 23.8 MG/DL
WBC # BLD AUTO: 14.7 K/UL (ref 4.1–11.1)

## 2023-08-31 PROCEDURE — 80061 LIPID PANEL: CPT

## 2023-08-31 PROCEDURE — 82550 ASSAY OF CK (CPK): CPT

## 2023-08-31 PROCEDURE — 2580000003 HC RX 258: Performed by: INTERNAL MEDICINE

## 2023-08-31 PROCEDURE — 85027 COMPLETE CBC AUTOMATED: CPT

## 2023-08-31 PROCEDURE — 99223 1ST HOSP IP/OBS HIGH 75: CPT | Performed by: PSYCHIATRY & NEUROLOGY

## 2023-08-31 PROCEDURE — 36415 COLL VENOUS BLD VENIPUNCTURE: CPT

## 2023-08-31 PROCEDURE — 84295 ASSAY OF SERUM SODIUM: CPT

## 2023-08-31 PROCEDURE — 83036 HEMOGLOBIN GLYCOSYLATED A1C: CPT

## 2023-08-31 PROCEDURE — 6370000000 HC RX 637 (ALT 250 FOR IP): Performed by: STUDENT IN AN ORGANIZED HEALTH CARE EDUCATION/TRAINING PROGRAM

## 2023-08-31 PROCEDURE — 97535 SELF CARE MNGMENT TRAINING: CPT

## 2023-08-31 PROCEDURE — 70551 MRI BRAIN STEM W/O DYE: CPT

## 2023-08-31 PROCEDURE — 97165 OT EVAL LOW COMPLEX 30 MIN: CPT

## 2023-08-31 PROCEDURE — 92610 EVALUATE SWALLOWING FUNCTION: CPT | Performed by: SPEECH-LANGUAGE PATHOLOGIST

## 2023-08-31 PROCEDURE — 93308 TTE F-UP OR LMTD: CPT

## 2023-08-31 PROCEDURE — 97161 PT EVAL LOW COMPLEX 20 MIN: CPT

## 2023-08-31 PROCEDURE — 2580000003 HC RX 258: Performed by: STUDENT IN AN ORGANIZED HEALTH CARE EDUCATION/TRAINING PROGRAM

## 2023-08-31 PROCEDURE — 2060000000 HC ICU INTERMEDIATE R&B

## 2023-08-31 PROCEDURE — 97530 THERAPEUTIC ACTIVITIES: CPT

## 2023-08-31 PROCEDURE — 6360000002 HC RX W HCPCS: Performed by: STUDENT IN AN ORGANIZED HEALTH CARE EDUCATION/TRAINING PROGRAM

## 2023-08-31 RX ADMIN — SODIUM CHLORIDE: 9 INJECTION, SOLUTION INTRAVENOUS at 17:46

## 2023-08-31 RX ADMIN — SODIUM CHLORIDE, PRESERVATIVE FREE 10 ML: 5 INJECTION INTRAVENOUS at 09:11

## 2023-08-31 RX ADMIN — GABAPENTIN 100 MG: 100 CAPSULE ORAL at 09:11

## 2023-08-31 RX ADMIN — ACETAMINOPHEN 650 MG: 325 TABLET ORAL at 02:21

## 2023-08-31 RX ADMIN — ENOXAPARIN SODIUM 40 MG: 100 INJECTION SUBCUTANEOUS at 09:10

## 2023-08-31 RX ADMIN — METOPROLOL SUCCINATE 25 MG: 25 TABLET, EXTENDED RELEASE ORAL at 09:11

## 2023-08-31 RX ADMIN — Medication: at 21:00

## 2023-08-31 RX ADMIN — Medication: at 09:13

## 2023-08-31 RX ADMIN — GABAPENTIN 100 MG: 100 CAPSULE ORAL at 20:59

## 2023-08-31 RX ADMIN — ASPIRIN 81 MG: 81 TABLET, COATED ORAL at 09:11

## 2023-08-31 ASSESSMENT — PAIN SCALES - GENERAL
PAINLEVEL_OUTOF10: 3
PAINLEVEL_OUTOF10: 0

## 2023-08-31 ASSESSMENT — PAIN DESCRIPTION - PAIN TYPE: TYPE: ACUTE PAIN

## 2023-08-31 ASSESSMENT — PAIN - FUNCTIONAL ASSESSMENT: PAIN_FUNCTIONAL_ASSESSMENT: PREVENTS OR INTERFERES SOME ACTIVE ACTIVITIES AND ADLS

## 2023-08-31 ASSESSMENT — PAIN DESCRIPTION - DESCRIPTORS: DESCRIPTORS: ACHING;SORE

## 2023-08-31 ASSESSMENT — PAIN DESCRIPTION - FREQUENCY: FREQUENCY: CONTINUOUS

## 2023-08-31 ASSESSMENT — PAIN DESCRIPTION - ONSET: ONSET: ON-GOING

## 2023-08-31 ASSESSMENT — PAIN DESCRIPTION - ORIENTATION: ORIENTATION: RIGHT;LEFT;ANTERIOR;MID

## 2023-08-31 NOTE — PROCEDURES
[facility]  EEG    Name:  Hollis Bedolla  MR#:  [id number]  :  1945  ACCOUNT #:  [de-identified]  DATE OF SERVICE:  2023    CLINICAL INDICATION:  The patient is a 79-year-old male with a history of altered mental status, possible seizures. EEG to rule out epilepsy, rule out cortical abnormality. EEG CLASSIFICATION:  On this patient is essentially normal awake and asleep. DESCRIPTION OF THE RECORD:  The background of this recording contains a posteriorly located occipital alpha rhythm of 8-9 Hz that does attenuate some with eye opening. Hyperventilation was not performed. Photic stimulation produced a mild driving response in the posterior head regions. The patient did enter states of sleep with K complexes and sleep spindles seen in the central head regions. The background rhythms consisted of a 9-10 Hz posteriorly located occipital alpha rhythm that attenuated with eye opening. INTERPRETATION:  This is an essentially normal electroencephalogram with the patient awake and asleep showing no clear focal areas of slowing. No clear spike or spike-and-wave discharges. No electrographic spells of any type seen. Clinical correlation recommended. Chelsie Kendrick MD      [id number]  D:  2023 21:18  T:  2023 23:36  JOB #:  [id number]  CC:   Chelsie Kendrick MD

## 2023-08-31 NOTE — WOUND CARE
Wound care nurse consult for POA   skin tears to bilateral forearms and elbows. Chart reviewed and patient assessed    66 y/o CM admitted for TIA and falls at home. Past Medical History:   Diagnosis Date    Arthritis     back,neck,shoulders    Arthritis     CAD (coronary artery disease)     per 10/2011 cardiology note    Cancer ([address]) 10/01/2011    prostate    Cardiomyopathy, alcoholic ([address])     per 00/0029 cardiology note    Epilepsy ([address]) 10/2022    Essential hypertension     Hypercholesteremia     Hypertension     Neuropathy     Other ill-defined conditions(799.89)     elevated cholesterol    Syncope and collapse      Past Surgical History:   Procedure Laterality Date    COLONOSCOPY N/A 11/8/2016    COLONOSCOPY performed by Zoie Fitch MD at [facility] ENDOSCOPY    HEENT      tonsillectomy    ORTHOPEDIC SURGERY      left ankle plate & screws    OTHER SURGICAL HISTORY      colonoscopy    [place] [facility]      cardiac cath x2 normal,2003    PROSTATE BIOPSY, NEEDLE, SATURATION SAMPLING      08/17/2011    PROSTATE SURGERY      PROSTATECTOMY  10/19/11    ROBOTIC ASSISTED LAPAROSCOPIC PROSTATECTOMY WITH LEFT PELVIC LYMPH NODE DISSECTION     Patient has bruises to bilateral knees and partial thickness skin tears and ecchymosis to both arms and elbows from multiple falls at home. Patient lives by himself and having syncopal episodes. Sacrum:Stage 1 Pressure injury          Skin tears: MWF, cleanse gently with NS. Cover wounds with pieces of Xeroform gauze, cover with 4x4/ABD pad and secure with olry. Avoid tape on skin. Sacrum/coccyx : BID Venelex ointment. Cleanse with NS at least daily.     [address], RN, CWON

## 2023-08-31 NOTE — PROGRESS NOTES
Hospitalist Progress Note    NAME:   Cookie Randall : 1945   MRN: [id number]     Date/Time: 2023 3:19 PM  Patient PCP: Latha Whitmore MD    Estimated discharge date:  Barriers: MRI, echo, , cardiac    Assessment / Plan:    TIA  Slurred speech  Rule out CVA  Recurrent falls  CT head no acute process  PA head and neck unremarkable apart of 50% stenosis left ICA origin. 25% stenosis right ICA origin. We will get neuro consult  Start aspirin and statin  MRI pending  Follow with echo     Lactic acidosis  Resolved  Likely due to dehydration  We will continue IV fluid  No signs for infectious process  Stop antibiotics     Hyponatremia  Likely due to alcohol use  We will trend sodium  We will get sodium urine and as modality     Recurrent falls  CT chest showed only contusions on the anterior and midline chest wall  X-ray is unremarkable apart of left elbow chronic radiocarpal arthritis and scapholunate advanced collapse. 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 Right elbow x-ray was unremarkable but repeat imaging in 7 to 10 days recommended  PT OT     Syncope? We will get EEG/CVA work-up as above  We will get echo  Telemetry monitor     RhabdoElevated CK  Trend CK, trending down  Follow-up with kidney function daily     NSTEMI  Trend troponin  Consult cardio  We will get echo        Alcohol use  CIWA protocol symptoms triggers        Hypertension  Chronic atopic dermatitis        Ecchymosis  Right elbow  Left arm  Wound care           Medical Decision Making:   I personally reviewed labs: CBC, BMP  I personally reviewed imaging: CT scan, x-ray  I personally reviewed EKG:  Toxic drug monitoring:   Discussed case with: PT, RN, CM      Code Status: Full code  DVT Prophylaxis: Lovenox  GI Prophylaxis: None indicated  Baseline:                 Subjective:     Chief Complaint / Reason for Physician Visit  \"Seen and examined today sitting in the chair, his speech is slightly improving\".   Discussed

## 2023-08-31 NOTE — CONSULTS
Consult  REFERRED BY:  Audelia Roque MD    CHIEF COMPLAINT: Patient with fall and passing out and generalized weakness for hours afterwards      Subjective:     Hollis Billings is a 65 y.o. right-handed  male we are seeing as a new patient, at the request of hospitalist, for evaluation of new problem with the patient giving history that he was working in his garage, began to feel weak and lightheaded and apparently passed out, and does not know how long, and felt generally weak afterwards, and could not get up, try to crawl into a chair, crawled around the garage, and then finally after several hours crawled outside and was trying to crawl through the yard to get to his house calling for help and the neighbor came and the rescue squad came and got him. He did not have any tongue biting or incontinence, but has no recollection of what made him pass out or how long he was passed out because he did not have a watch on. He thinks he passed out about 5 or 6 PM yesterday. He is never passed out before. His EEG was perfectly normal yesterday. The CT of the head negative. CTA did show some moderate left internal carotid artery stenosis 50% but otherwise normal, and he had a previous CT of the cervical spine a year ago that just showed moderate degenerative changes and MRI of the lumbar spine in 2019 that showed a previous back surgery but no recurrent stenosis. He does not really have that much new neck pain or back pain. Neck is a little stiff. He has no headache now. There is no cognitive issues now. He is very hard of hearing. His CPK was 35,000 probably from the laying down not being able to get up. Looks a little dehydrated. Does have a history of coronary artery disease prostate cancer cardiomyopathy alcohol possibly the cause, history of seizures in the past, essential hypertension hyperlipidemia neuropathy. He has no history of fainting or seizures.     Past Medical History:   Diagnosis 182 lb (82.6 kg)   Height:    5' 10.98\" (1.803 m)     Objective:     I      NEUROLOGICAL EXAM:    Appearance: The patient is well developed, well nourished, provides a coherent history and is in no acute distress. Mental Status: Oriented to time, place and person, and the president, cognitive function is normal and speech is fluent and no aphasia or dysarthria. Mood and affect appropriate. Cranial Nerves:   Intact visual fields. Fundi are benign, disc are flat, no lesions seen on funduscopy. DAVID, EOM's full, no nystagmus, no ptosis. Facial sensation is normal. Corneal reflexes are not tested. Facial movement is symmetric. Hearing is abnormal bilaterally. Palate is midline with normal sternocleidomastoid and trapezius muscles are normal. Tongue is midline. Neck without meningismus or bruits  Temporal arteries are not tender or enlarged  TMJ areas are not tender on palpation   Motor:  4/5 strength in upper and lower proximal and distal muscles. Normal bulk and tone. No fasciculations. Rapid alternating movement is symmetric and slow bilaterally   Reflexes:   Deep tendon reflexes 1+/4 and symmetrical.  No babinski or clonus present   Sensory:   Normal to touch, pinprick and vibration and temperature slightly decreased in both feet. DSS is intact   Gait:  Abnormal gait for patient's age, as he moves slowly with wide-based gait due to generalized weakness and needs contact-guard assistance. Tremor:   No tremor noted. Cerebellar:  Mildly abnormal cerebellar signs present on Romberg and tandem testing and finger-nose-finger exam.   Neurovascular:  Normal heart sounds and regular rhythm, peripheral pulses decreased, and no carotid bruits.            Assessment:   [unfilled]  Principal Problem:    TIA (transient ischemic attack)  Active Problems:    Convulsive syncope    Acute alteration in mental status    Bilateral carotid artery stenosis    Cerebral microvascular disease    General weakness  Resolved Problems:

## 2023-08-31 NOTE — PLAN OF CARE
Problem: Occupational Therapy - Adult  Goal: By Discharge: Performs self-care activities at highest level of function for planned discharge setting. See evaluation for individualized goals. Description: FUNCTIONAL STATUS PRIOR TO ADMISSION:  Patient was ambulatory using no DME.   , ADL Assistance: Independent,  ,  ,  ,  ,  , Homemaking Assistance: Independent,  ,  ,       HOME SUPPORT: Patient lived alone with neighbor to provide assistance. States his sister will be staying with him upon discharge. Occupational Therapy Goals:  Initiated 8/31/2023  1. Patient will perform RW grooming with Modified [place] within 7 day(s). 2.  Patient will perform seated upper body dressing with Modified [place] within 7 day(s). 3.  Patient will perform seated bathing with Set-up within 7 day(s). 4.  Patient will perform RW toilet transfers with Modified [place]  within 7 day(s). 5.  Patient will perform all aspects of RW toileting with Modified [place] within 7 day(s). Outcome: Progressing    OCCUPATIONAL THERAPY EVALUATION    Patient: Lyle Pfeiffer (73 y.o. male)  Date: 8/31/2023  Primary Diagnosis: TIA (transient ischemic attack) [G45.9]         Precautions:  (skin integrity- multiple abrasions and skin tears noted)                  ASSESSMENT :  The patient is limited by decreased strength/endurance, decreased mobility/balance, decreased activity tolerance, c/o L rib pain, multiple abrasions to BUE/BLE, mild L sided facial droop, RLE spasms, hx ETOH use (reports 3-5 daily drinks) and high c/o pain with twisting, all of which limit pt's ability to safely complete self-care routine. Currently, pt is Min A for UB ADLs and Mod A for LB ADLs completion, with Mod A for bed mobility and CGA for transfers at [facility]. Pt pleasant with therapy, with good effort and reporting therapist believing pt's rib pain greatest limiting factor with ADL related mobility/balance.   Pt benefits from skilled OT

## 2023-08-31 NOTE — PLAN OF CARE
Problem: Safety - Adult  Goal: Free from fall injury  8/31/2023 1536 by Waleska Day RN  Outcome: Progressing  8/31/2023 1026 by Waleska Day RN  Outcome: Progressing     Problem: Pain  Goal: Verbalizes/displays adequate comfort level or baseline comfort level  8/31/2023 1536 by Waleska Day RN  Outcome: Progressing  8/31/2023 1026 by Waleska Day RN  Outcome: Progressing     Problem: ABCDS Injury Assessment  Goal: Absence of physical injury  Outcome: Progressing     Problem: Occupational Therapy - Adult  Goal: By Discharge: Performs self-care activities at highest level of function for planned discharge setting. See evaluation for individualized goals. Description: FUNCTIONAL STATUS PRIOR TO ADMISSION:  Patient was ambulatory using no DME.   , ADL Assistance: Independent,  ,  ,  ,  ,  , Homemaking Assistance: Independent,  ,  ,       HOME SUPPORT: Patient lived alone with neighbor to provide assistance. States his sister will be staying with him upon discharge. Occupational Therapy Goals:  Initiated 8/31/2023  1. Patient will perform RW grooming with Modified [place] within 7 day(s). 2.  Patient will perform seated upper body dressing with Modified [place] within 7 day(s). 3.  Patient will perform seated bathing with Set-up within 7 day(s). 4.  Patient will perform RW toilet transfers with Modified [place]  within 7 day(s). 5.  Patient will perform all aspects of RW toileting with Modified [place] within 7 day(s).       8/31/2023 1200 by Steve Cain OT  Outcome: Progressing

## 2023-08-31 NOTE — PROGRESS NOTES
[facility]     Progress Note      8/31/2023 7:04 AM  NAME: Lorenzo Pappas. MRN:  [id number]   Admit Diagnosis: TIA (transient ischemic attack) [G45.9]     Problem List:     Alcohol abuse  DEVON with severe hyponatremia down to 121  ?syncope vs. On ground unable to get up  Severe rhabdo with ck of 35K  Increased hs troponin  Increased LFT  Possible TIA    - Cath 2003 mild CAD, stress 2021 no ischemia  - non-ischemic Cardiomyopathy Echo 2021 EF 45%, HARMONY 8/2022 mild MR  - LBBB, holter 5/2021 no sig arrhythmia, EVR 12/2022 no sig arrhyhtmia  - Syncope 2020, beta blocker stopped at [facility], evaluated by neurology  - HTN  - Dyslipidemia  - Low sodium  - Mild anemia, low plt 123  - Shortness of breath being evaluated by pulmonary  - IBS  - [place]  - Prostate CA  - Cellulitis 8/2022   - EtOH abuse 5-6 beers daily, quit 4/2022  - No tobacco  Single, no kids, retired  for [employer], uses a cane  here with sister        Assessment/Plan:     - No chest pain, increased hs troponin likely secondary to rhabdo, type II manage medically  - Euvolemic but at risk for chf given decreased EF  - Sinus, non-sustained SVT    MRI of brain pending    - Cont asa  - Resume metoprolol  - Decrease hydration to 50 cc/hr stop soon  - Holding statin until CK and LFT improve          [x]       High complexity decision making was performed in this patient at high risk for decompensation with multiple organ involvement. We discussed the expected course, resolution and complications of the diagnoses in detail. Medication risk, benefits, costs, interactions, and alternatives were discussed as indicated. I advised him to contact the office if his condition worsens, changes or fails to improve as anticipated. Patient expressed understanding with the diagnoses  and plan. Subjective:     Lorenzo Pappas. denies chest pain, dyspnea. Discussed with RN events overnight.      Review of Systems:    Symptom Y/N Comments

## 2023-08-31 NOTE — PROGRESS NOTES
Orders received, chart reviewed and patient evaluated by physical therapy. Pending progression with skilled acute physical therapy, recommend:  Therapy 2 days/week in the home and assist for safety    Recommend with nursing OOB to chair 3x/day with 1 person assist and rolling walker. Thank you for completing as able in order to maintain patient strength, endurance and independence. Full evaluation to follow.      Benedict Robertson, PT

## 2023-08-31 NOTE — PLAN OF CARE
Problem: Physical Therapy - Adult  Goal: By Discharge: Performs mobility at highest level of function for planned discharge setting. See evaluation for individualized goals. Description: FUNCTIONAL STATUS PRIOR TO ADMISSION: Patient was modified independent using a rolling walker and single point cane for functional mobility. , The patient  was modified independent for basic and instrumental ADLs. , and The patient and/or family endorse recurrent falls within the last 6 months. HOME SUPPORT PRIOR TO ADMISSION: The patient lived alone with no local support. Sister lives in [facility] and plans to stay with patient for a short period once he returns home. Physical Therapy Goals  Initiated 8/31/2023  1. Patient will move from supine to sit and sit to supine, scoot up and down, and roll side to side in bed with modified independence within 7 day(s). 2.  Patient will perform sit to stand with modified independence within 7 day(s). 3.  Patient will transfer from bed to chair and chair to bed with supervision/set-up using the least restrictive device within 7 day(s). 4.  Patient will ambulate with standby assistance for 250 feet with the least restrictive device within 7 day(s). 5.  Patient will ascend/descend 4 stairs with 1 handrail(s) with supervision/set-up within 7 day(s). Outcome: Not Progressing    PHYSICAL THERAPY EVALUATION    Patient: Lexi Soto (73 y.o. male)  Date: 8/31/2023  Primary Diagnosis: TIA (transient ischemic attack) [G45.9]       Precautions: Fall Risk, Up as Tolerated                    ASSESSMENT :   DEFICITS/IMPAIRMENTS:   The patient is limited by decreased functional mobility, independence in ADLs, high-level IADLs, strength, activity tolerance, endurance, coordination, balance, increased pain levels following admission for TIA, GLF with rhabdomyolysis due to prolonged stay on the ground before being found. MRI negative for acute process in brain.     Based on the

## 2023-08-31 NOTE — PROGRESS NOTES
End of Shift Note    Bedside shift change report given to [address] (oncoming nurse) by Rhonda Carrion (offgoing nurse). Report included the following information SBAR    Shift worked:  7am-7pm     Shift summary and any significant changes:     No significant change noted MRI completed wound care don  by wound care nurse work with therapy       Concerns for physician to address:  None      Zone phone for oncoming shift:          Activity:     Number times ambulated in hallways past shift: 0  Number of times OOB to chair past shift: 1    Cardiac:   Cardiac Monitoring: Yes           Access:  Current line(s): PIV     Genitourinary:   Urinary status: external catheter    Respiratory:      Chronic home O2 use?: NO  Incentive spirometer at bedside: NO       GI:     Current diet:  ADULT DIET;  Regular  Passing flatus: YES  Tolerating current diet: YES       Pain Management:   Patient states pain is manageable on current regimen: YES    Skin:     Interventions: increase time out of bed    Patient Safety:  Fall Score:    Interventions: bed/chair alarm and gripper socks       Length of Stay:  Expected LOS: 3  Actual LOS: 1      Shakir Giordano RN

## 2023-09-01 ENCOUNTER — APPOINTMENT (OUTPATIENT)
Facility: HOSPITAL | Age: 78
End: 2023-09-01
Attending: PSYCHIATRY & NEUROLOGY
Payer: MEDICARE

## 2023-09-01 LAB
25(OH)D3 SERPL-MCNC: 20.7 NG/ML (ref 30–100)
ALBUMIN SERPL-MCNC: 2.9 G/DL (ref 3.5–5)
ALBUMIN/GLOB SERPL: 1 (ref 1.1–2.2)
ALP SERPL-CCNC: 60 U/L (ref 45–117)
ALT SERPL-CCNC: 131 U/L (ref 12–78)
ANION GAP SERPL CALC-SCNC: 4 MMOL/L (ref 5–15)
AST SERPL-CCNC: 372 U/L (ref 15–37)
BILIRUB SERPL-MCNC: 0.7 MG/DL (ref 0.2–1)
BUN SERPL-MCNC: 15 MG/DL (ref 6–20)
BUN/CREAT SERPL: 18 (ref 12–20)
CALCIUM SERPL-MCNC: 8.1 MG/DL (ref 8.5–10.1)
CHLORIDE SERPL-SCNC: 102 MMOL/L (ref 97–108)
CK SERPL-CCNC: ABNORMAL U/L (ref 39–308)
CO2 SERPL-SCNC: 24 MMOL/L (ref 21–32)
CREAT SERPL-MCNC: 0.83 MG/DL (ref 0.7–1.3)
ECHO BSA: 2.03 M2
EKG ATRIAL RATE: 90 BPM
EKG DIAGNOSIS: NORMAL
EKG P AXIS: 13 DEGREES
EKG P-R INTERVAL: 158 MS
EKG Q-T INTERVAL: 398 MS
EKG QRS DURATION: 150 MS
EKG QTC CALCULATION (BAZETT): 486 MS
EKG R AXIS: -10 DEGREES
EKG T AXIS: 151 DEGREES
EKG VENTRICULAR RATE: 90 BPM
ERYTHROCYTE [SEDIMENTATION RATE] IN BLOOD: 30 MM/HR (ref 0–20)
FOLATE SERPL-MCNC: 22.2 NG/ML (ref 5–21)
GLOBULIN SER CALC-MCNC: 2.9 G/DL (ref 2–4)
GLUCOSE SERPL-MCNC: 96 MG/DL (ref 65–100)
POTASSIUM SERPL-SCNC: 3.8 MMOL/L (ref 3.5–5.1)
PROT SERPL-MCNC: 5.8 G/DL (ref 6.4–8.2)
SODIUM SERPL-SCNC: 130 MMOL/L (ref 136–145)
VAS LEFT CCA DIST EDV: 17.3 CM/S
VAS LEFT CCA DIST PSV: 76.6 CM/S
VAS LEFT CCA PROX EDV: 15.1 CM/S
VAS LEFT CCA PROX PSV: 70 CM/S
VAS LEFT ECA EDV: 0 CM/S
VAS LEFT ECA PSV: 91.9 CM/S
VAS LEFT ICA DIST EDV: 23.9 CM/S
VAS LEFT ICA DIST PSV: 96.3 CM/S
VAS LEFT ICA MID EDV: 19.5 CM/S
VAS LEFT ICA MID PSV: 74.4 CM/S
VAS LEFT ICA PROX EDV: 26.1 CM/S
VAS LEFT ICA PROX PSV: 107.3 CM/S
VAS LEFT ICA/CCA PSV: 1.4 NO UNITS
VAS LEFT SUBCLAVIAN PROX EDV: 0 CM/S
VAS LEFT SUBCLAVIAN PROX PSV: 258.2 CM/S
VAS LEFT VERTEBRAL EDV: 7.2 CM/S
VAS LEFT VERTEBRAL PSV: 41.6 CM/S
VAS RIGHT CCA DIST EDV: 12.6 CM/S
VAS RIGHT CCA DIST PSV: 61.9 CM/S
VAS RIGHT CCA PROX EDV: 14.4 CM/S
VAS RIGHT CCA PROX PSV: 61.9 CM/S
VAS RIGHT ECA EDV: 0 CM/S
VAS RIGHT ECA PSV: 94.8 CM/S
VAS RIGHT ICA DIST EDV: 25.4 CM/S
VAS RIGHT ICA DIST PSV: 100.3 CM/S
VAS RIGHT ICA MID EDV: 25.4 CM/S
VAS RIGHT ICA MID PSV: 93 CM/S
VAS RIGHT ICA PROX EDV: 18.1 CM/S
VAS RIGHT ICA PROX PSV: 63.7 CM/S
VAS RIGHT ICA/CCA PSV: 1.6 NO UNITS
VAS RIGHT SUBCLAVIAN PROX EDV: 0 CM/S
VAS RIGHT SUBCLAVIAN PROX PSV: 133.1 CM/S
VAS RIGHT VERTEBRAL EDV: 8.9 CM/S
VAS RIGHT VERTEBRAL PSV: 43.7 CM/S
VIT B12 SERPL-MCNC: 807 PG/ML (ref 193–986)

## 2023-09-01 PROCEDURE — 82607 VITAMIN B-12: CPT

## 2023-09-01 PROCEDURE — 97161 PT EVAL LOW COMPLEX 20 MIN: CPT

## 2023-09-01 PROCEDURE — 6370000000 HC RX 637 (ALT 250 FOR IP): Performed by: STUDENT IN AN ORGANIZED HEALTH CARE EDUCATION/TRAINING PROGRAM

## 2023-09-01 PROCEDURE — 82306 VITAMIN D 25 HYDROXY: CPT

## 2023-09-01 PROCEDURE — 6370000000 HC RX 637 (ALT 250 FOR IP): Performed by: NURSE PRACTITIONER

## 2023-09-01 PROCEDURE — 86235 NUCLEAR ANTIGEN ANTIBODY: CPT

## 2023-09-01 PROCEDURE — 85652 RBC SED RATE AUTOMATED: CPT

## 2023-09-01 PROCEDURE — 82746 ASSAY OF FOLIC ACID SERUM: CPT

## 2023-09-01 PROCEDURE — 97116 GAIT TRAINING THERAPY: CPT

## 2023-09-01 PROCEDURE — 93880 EXTRACRANIAL BILAT STUDY: CPT

## 2023-09-01 PROCEDURE — 2580000003 HC RX 258: Performed by: STUDENT IN AN ORGANIZED HEALTH CARE EDUCATION/TRAINING PROGRAM

## 2023-09-01 PROCEDURE — 86225 DNA ANTIBODY NATIVE: CPT

## 2023-09-01 PROCEDURE — 6370000000 HC RX 637 (ALT 250 FOR IP): Performed by: INTERNAL MEDICINE

## 2023-09-01 PROCEDURE — 82550 ASSAY OF CK (CPK): CPT

## 2023-09-01 PROCEDURE — 97530 THERAPEUTIC ACTIVITIES: CPT

## 2023-09-01 PROCEDURE — 6360000002 HC RX W HCPCS: Performed by: STUDENT IN AN ORGANIZED HEALTH CARE EDUCATION/TRAINING PROGRAM

## 2023-09-01 PROCEDURE — 80053 COMPREHEN METABOLIC PANEL: CPT

## 2023-09-01 PROCEDURE — 93880 EXTRACRANIAL BILAT STUDY: CPT | Performed by: PSYCHIATRY & NEUROLOGY

## 2023-09-01 PROCEDURE — 97535 SELF CARE MNGMENT TRAINING: CPT

## 2023-09-01 PROCEDURE — 2060000000 HC ICU INTERMEDIATE R&B

## 2023-09-01 PROCEDURE — 83516 IMMUNOASSAY NONANTIBODY: CPT

## 2023-09-01 PROCEDURE — 36415 COLL VENOUS BLD VENIPUNCTURE: CPT

## 2023-09-01 RX ORDER — MULTIVITAMIN WITH IRON
1 TABLET ORAL DAILY
Status: DISCONTINUED | OUTPATIENT
Start: 2023-09-01 | End: 2023-09-02 | Stop reason: HOSPADM

## 2023-09-01 RX ORDER — LORAZEPAM 1 MG/1
4 TABLET ORAL
Status: DISCONTINUED | OUTPATIENT
Start: 2023-09-01 | End: 2023-09-02 | Stop reason: HOSPADM

## 2023-09-01 RX ORDER — LORAZEPAM 2 MG/ML
2 INJECTION INTRAMUSCULAR
Status: DISCONTINUED | OUTPATIENT
Start: 2023-09-01 | End: 2023-09-02 | Stop reason: HOSPADM

## 2023-09-01 RX ORDER — SODIUM CHLORIDE 9 MG/ML
INJECTION, SOLUTION INTRAVENOUS PRN
Status: DISCONTINUED | OUTPATIENT
Start: 2023-09-01 | End: 2023-09-02 | Stop reason: HOSPADM

## 2023-09-01 RX ORDER — HYDROXYZINE HYDROCHLORIDE 25 MG/1
25 TABLET, FILM COATED ORAL ONCE
Status: COMPLETED | OUTPATIENT
Start: 2023-09-01 | End: 2023-09-01

## 2023-09-01 RX ORDER — LORAZEPAM 1 MG/1
2 TABLET ORAL
Status: DISCONTINUED | OUTPATIENT
Start: 2023-09-01 | End: 2023-09-02 | Stop reason: HOSPADM

## 2023-09-01 RX ORDER — LORAZEPAM 2 MG/ML
1 INJECTION INTRAMUSCULAR
Status: DISCONTINUED | OUTPATIENT
Start: 2023-09-01 | End: 2023-09-02 | Stop reason: HOSPADM

## 2023-09-01 RX ORDER — GAUZE BANDAGE 2" X 2"
100 BANDAGE TOPICAL DAILY
Status: DISCONTINUED | OUTPATIENT
Start: 2023-09-01 | End: 2023-09-02 | Stop reason: HOSPADM

## 2023-09-01 RX ORDER — SODIUM CHLORIDE 0.9 % (FLUSH) 0.9 %
5-40 SYRINGE (ML) INJECTION EVERY 12 HOURS SCHEDULED
Status: DISCONTINUED | OUTPATIENT
Start: 2023-09-01 | End: 2023-09-02 | Stop reason: HOSPADM

## 2023-09-01 RX ORDER — LORAZEPAM 1 MG/1
1 TABLET ORAL
Status: DISCONTINUED | OUTPATIENT
Start: 2023-09-01 | End: 2023-09-02 | Stop reason: HOSPADM

## 2023-09-01 RX ORDER — SODIUM CHLORIDE 0.9 % (FLUSH) 0.9 %
5-40 SYRINGE (ML) INJECTION PRN
Status: DISCONTINUED | OUTPATIENT
Start: 2023-09-01 | End: 2023-09-02 | Stop reason: HOSPADM

## 2023-09-01 RX ORDER — LORAZEPAM 2 MG/ML
4 INJECTION INTRAMUSCULAR
Status: DISCONTINUED | OUTPATIENT
Start: 2023-09-01 | End: 2023-09-02 | Stop reason: HOSPADM

## 2023-09-01 RX ORDER — LORAZEPAM 1 MG/1
3 TABLET ORAL
Status: DISCONTINUED | OUTPATIENT
Start: 2023-09-01 | End: 2023-09-02 | Stop reason: HOSPADM

## 2023-09-01 RX ORDER — LORAZEPAM 2 MG/ML
3 INJECTION INTRAMUSCULAR
Status: DISCONTINUED | OUTPATIENT
Start: 2023-09-01 | End: 2023-09-02 | Stop reason: HOSPADM

## 2023-09-01 RX ADMIN — ACETAMINOPHEN 650 MG: 325 TABLET ORAL at 02:19

## 2023-09-01 RX ADMIN — Medication: at 09:31

## 2023-09-01 RX ADMIN — SODIUM CHLORIDE, PRESERVATIVE FREE 10 ML: 5 INJECTION INTRAVENOUS at 21:13

## 2023-09-01 RX ADMIN — SODIUM CHLORIDE, PRESERVATIVE FREE 10 ML: 5 INJECTION INTRAVENOUS at 21:12

## 2023-09-01 RX ADMIN — ACETAMINOPHEN 650 MG: 325 TABLET ORAL at 23:24

## 2023-09-01 RX ADMIN — ASPIRIN 81 MG: 81 TABLET, COATED ORAL at 09:04

## 2023-09-01 RX ADMIN — GABAPENTIN 100 MG: 100 CAPSULE ORAL at 21:12

## 2023-09-01 RX ADMIN — GABAPENTIN 100 MG: 100 CAPSULE ORAL at 09:04

## 2023-09-01 RX ADMIN — METOPROLOL SUCCINATE 25 MG: 25 TABLET, EXTENDED RELEASE ORAL at 09:03

## 2023-09-01 RX ADMIN — Medication 100 MG: at 13:59

## 2023-09-01 RX ADMIN — ENOXAPARIN SODIUM 40 MG: 100 INJECTION SUBCUTANEOUS at 09:02

## 2023-09-01 RX ADMIN — THERA TABS 1 TABLET: TAB at 09:03

## 2023-09-01 RX ADMIN — Medication: at 21:12

## 2023-09-01 RX ADMIN — HYDROXYZINE HYDROCHLORIDE 25 MG: 25 TABLET, FILM COATED ORAL at 22:13

## 2023-09-01 ASSESSMENT — PAIN DESCRIPTION - LOCATION
LOCATION: BACK;NECK
LOCATION: HIP;BACK

## 2023-09-01 ASSESSMENT — PAIN DESCRIPTION - DESCRIPTORS
DESCRIPTORS: ACHING
DESCRIPTORS: ACHING

## 2023-09-01 ASSESSMENT — PAIN DESCRIPTION - FREQUENCY
FREQUENCY: CONTINUOUS
FREQUENCY: CONTINUOUS

## 2023-09-01 ASSESSMENT — PAIN DESCRIPTION - ONSET
ONSET: SUDDEN
ONSET: ON-GOING

## 2023-09-01 ASSESSMENT — PAIN - FUNCTIONAL ASSESSMENT
PAIN_FUNCTIONAL_ASSESSMENT: PREVENTS OR INTERFERES SOME ACTIVE ACTIVITIES AND ADLS
PAIN_FUNCTIONAL_ASSESSMENT: ACTIVITIES ARE NOT PREVENTED

## 2023-09-01 ASSESSMENT — PAIN SCALES - GENERAL
PAINLEVEL_OUTOF10: 7
PAINLEVEL_OUTOF10: 3
PAINLEVEL_OUTOF10: 7

## 2023-09-01 ASSESSMENT — PAIN DESCRIPTION - PAIN TYPE
TYPE: CHRONIC PAIN
TYPE: CHRONIC PAIN

## 2023-09-01 NOTE — PROGRESS NOTES
[facility]     Progress Note      9/1/2023 6:45 AM  NAME: Pamela Gastelum. MRN:  [id number]   Admit Diagnosis: TIA (transient ischemic attack) [G45.9]     Problem List:     Alcohol abuse  DEVON with severe hyponatremia down to 121  ?syncope vs. On ground unable to get up  Severe rhabdo with ck of 35K  Increased hs troponin  Increased LFT  Possible TIA    - Cath 2003 mild CAD, stress 2021 no ischemia  - non-ischemic Cardiomyopathy Echo 2021 EF 45%, HARMONY 8/2022 mild MR  - LBBB, holter 5/2021 no sig arrhythmia, EVR 12/2022 no sig arrhyhtmia  - Syncope 2020, beta blocker stopped at [address], evaluated by neurology  - HTN  - Dyslipidemia  - Low sodium  - Mild anemia, low plt 123  - Shortness of breath being evaluated by pulmonary  - IBS  - [place]  - Prostate CA  - Cellulitis 8/2022   - EtOH abuse 5-6 beers daily, quit 4/2022  - No tobacco  Single, no kids, retired  for "[employer]", uses a cane  here with sister        Assessment/Plan:     - No chest pain, increased hs troponin likely secondary to rhabdo, type II manage medically  - Euvolemic but at risk for chf given decreased EF  - Sinus, non-sustained SVT    MRI of brain unrevealing    - Cont asa  - Cont metoprolol  - Stop hydration  - Holding statin until CK and LFT improve    Dr. Juan Gonzalez available this weekend as needed          [x]       High complexity decision making was performed in this patient at high risk for decompensation with multiple organ involvement. We discussed the expected course, resolution and complications of the diagnoses in detail. Medication risk, benefits, costs, interactions, and alternatives were discussed as indicated. I advised him to contact the office if his condition worsens, changes or fails to improve as anticipated. Patient expressed understanding with the diagnoses  and plan. Subjective:     Pamela Gastelum. denies chest pain, dyspnea. Discussed with RN events overnight.      Review of Labs     08/30/23  0620 09/01/23  0224   GLOB 3.7 2.9       No results for input(s): PH, PCO2, PO2 in the last 72 hours.     Medications Personally Reviewed:    Current Facility-Administered Medications   Medication Dose Route Frequency    multivitamin 1 tablet  1 tablet Oral Daily    acetaminophen (TYLENOL) tablet 650 mg  650 mg Oral Q4H PRN    sodium chloride flush 0.9 % injection 5-40 mL  5-40 mL IntraVENous 2 times per day    sodium chloride flush 0.9 % injection 5-40 mL  5-40 mL IntraVENous PRN    0.9 % sodium chloride infusion   IntraVENous PRN    ondansetron (ZOFRAN-ODT) disintegrating tablet 4 mg  4 mg Oral Q8H PRN    Or    ondansetron (ZOFRAN) injection 4 mg  4 mg IntraVENous Q6H PRN    polyethylene glycol (GLYCOLAX) packet 17 g  17 g Oral Daily PRN    enoxaparin (LOVENOX) injection 40 mg  40 mg SubCUTAneous Daily    [Held by provider] atorvastatin (LIPITOR) tablet 40 mg  40 mg Oral Nightly    labetalol (NORMODYNE;TRANDATE) injection 10 mg  10 mg IntraVENous Q10 Min PRN    aspirin EC tablet 81 mg  81 mg Oral Daily    gabapentin (NEURONTIN) capsule 100 mg  100 mg Oral BID    metoprolol succinate (TOPROL XL) extended release tablet 25 mg  25 mg Oral Daily    balsum peru-castor oil (VENELEX) ointment   Topical BID         Albert Souza MD

## 2023-09-01 NOTE — CARE COORDINATION
Transition of Care Plan:    RUR:   Prior Level of Functioning: independant  Disposition: home with At [address] [place] [address]  If SNF or IPR: Date FOC offered:   Date FOC received:   Accepting facility:   Date authorization started with reference number:   Date authorization received and expires: Follow up appointments: PCP and specialist  DME needed: walker to be provided  Transportation at discharge: sister  IM/IMM Medicare/ letter given: signed  Is patient a [place] and connected with [facility]? na   If yes, was [facility] transfer form completed and [facility] notified? Caregiver Contact: Bev Ontiveros (Other)   [phone] [facility]   Discharge Caregiver contacted prior to discharge? Care Conference needed? Barriers to discharge: clinical improvement    Patient lives alone. Sister to provide transportation home. [email or web address] Counter will be provided. At [address] [place] [address] to provide OT PT SN  Sister given a PCA list to look at in case they want to get extra help at home. Wound care information will need to be communicated to [facility] before DC. CM will give substance abuse information to patient for [facility] as requested by physician. Nurse to give patient wound care supplies to go home with and some instruction. HH knows there are arm wounds.     Ritu Hall RN [address]

## 2023-09-01 NOTE — PLAN OF CARE
Problem: Occupational Therapy - Adult  Goal: By Discharge: Performs self-care activities at highest level of function for planned discharge setting. See evaluation for individualized goals. Description: FUNCTIONAL STATUS PRIOR TO ADMISSION:  Patient was ambulatory using no DME.   , ADL Assistance: Independent,  ,  ,  ,  ,  , Homemaking Assistance: Independent,  ,  ,       HOME SUPPORT: Patient lived alone with neighbor to provide assistance. States his sister will be staying with him upon discharge. Occupational Therapy Goals:  Initiated 8/31/2023  1. Patient will perform RW grooming with Modified [place] within 7 day(s). 2.  Patient will perform seated upper body dressing with Modified [place] within 7 day(s). 3.  Patient will perform seated bathing with Set-up within 7 day(s). 4.  Patient will perform RW toilet transfers with Modified [place]  within 7 day(s). 5.  Patient will perform all aspects of RW toileting with Modified [place] within 7 day(s). Outcome: Progressing   OCCUPATIONAL THERAPY TREATMENT  Patient: Lyle Pfeiffer (73 y.o. male)  Date: 9/1/2023  Primary Diagnosis: TIA (transient ischemic attack) [G45.9]       Precautions: Fall Risk, Up as Tolerated                Chart, occupational therapy assessment, plan of care, and goals were reviewed. ASSESSMENT  Patient continues to benefit from skilled OT services and is progressing towards goals. Pt stated that he woke up this am and did not know where he was at first.  He was able to don socks with no asisst , stood and walked to bathroom and in room with use of RW. Pt at this time reports that he will not go to SNF and will return home. He lives alone and talked to his sister that was in the room, about getting help in home for at least the first few weeks and HHOT             PLAN :  Patient continues to benefit from skilled intervention to address the above impairments.   Continue treatment per established

## 2023-09-01 NOTE — PROGRESS NOTES
Physician Progress Note      Cari Stein  [facility] #:                  [id number]  :                       1945  ADMIT DATE:       2023 6:16 AM  DISCH DATE:  RESPONDING  PROVIDER #:        Noel Mccabe MD        QUERY TEXT:    Type of Anemia: Please provide further specificity, if known. Clinical indicators include: mild anemia, cancer, rbc, hemoglobin, hematocrit,   platelets, normocytic, normochromic, hemorrhage, hemoptysis, bleeding,   hematuria, hg, hgb, hct  Options provided:  -- Anemia due to acute blood loss  -- Anemia due to chronic blood loss  -- Anemia due to iron deficiency  -- Anemia due to postoperative blood loss  -- Anemia due to chronic disease  -- Other - I will add my own diagnosis  -- Disagree - Not applicable / Not valid  -- Disagree - Clinically Unable to determine / Unknown        PROVIDER RESPONSE TEXT:    Provider was unable to determine a response for this query.   Please ask internal medicine team.      Electronically signed by:  Noel Mccabe MD 2023 4:29 PM

## 2023-09-01 NOTE — PROGRESS NOTES
1900: Verbal report received from Ed, RN(name), including SBAR. Opportunity for questions and clarification was provided. [id number]: Shift assessment complete. See flowsheets. Noted pt has sodium lab ordered Q8h, which is overdue. Lab drawn at this time. Pt's BUE dressings not intact. Re-dressed wounds per orders. [id number]: Assisted pt to side of bed, as pt c/o back pain r/t lying in bed. Pt's HR increased when sitting on side of bed ('s bpm). Pt has no cardiac related complaints. In report, this RN told pt had 3rd deg AVB, but this RN only noted LBBB on monitor. Performed 12 lead EKG to get more accurate picture and to make sure rhythm had not changed and that pt was not having new ectopy. Also noticed pt's monitor leads were placed incorrectly. Corrected lead placement. 12 lead showed LBBB, PVC's, and PsVC's. Assisted pt back to lying in bed. HR now 83 bpm. Will continue to monitor. 0700:   Verbal report given to [facility], RN, including SBAR. Opportunity for questions and clarification was provided.

## 2023-09-01 NOTE — CARE COORDINATION
Care Management Initial Assessment       RUR: 13  Readmission? No  1st IM letter given? Yes   1st  letter given: No         08/31/23 2024   Service Assessment   Patient Orientation Alert and Oriented   Cognition Alert   History Provided By Patient; Child/Family  (Sister Ramiro)   Primary Caregiver Self   Support Systems Family Members  (Sister assist pt for his appointments)   PCP Verified by CM Yes   Last Visit to PCP Within last 6 months   Prior Functional Level Independent in ADLs/IADLs   Current Functional Level Independent in ADLs/IADLs   Can patient return to prior living arrangement Yes   Ability to make needs known: Good   Family able to assist with home care needs: No  (Sister lives in [facility])   Would you like for me to discuss the discharge plan with any other family members/significant others, and if so, who?  Yes  (Sister- Ramiro)   Financial Resources Medicare   Social/Functional History   Lives With Alone   Type of [address]  One level   [address] to enter with rails   Entrance Stairs - Number of Steps 3   Bathroom Shower/Tub Tub/Shower unit   [employer] Grab bars in [address]  (Need RW upon d/c As per PT reecomendation)   ADL Assistance Independent   Homemaking Assistance Independent   Homemaking Responsibilities Yes   Ambulation Assistance Independent   Transfer Assistance Independent   Active  Yes   Mode of Transportation Car   Occupation Retired   Discharge Planning   Patient expects to be discharged to: [facility] Discharge   Transition of [address] [place] [address] Center  ([address] [facility]) Luis F No  (Pt had previous experiances with At home care)   Reason Outside Agency Chosen Patient already serviced by other home care/hospice agency   Mode of Transport at Discharge Self  (Sister will transport)       [address]    CM spoke to pt's sister, she reported that earlier

## 2023-09-01 NOTE — PLAN OF CARE
Problem: Physical Therapy - Adult  Goal: By Discharge: Performs mobility at highest level of function for planned discharge setting. See evaluation for individualized goals. Description: FUNCTIONAL STATUS PRIOR TO ADMISSION: Patient was modified independent using a rolling walker and single point cane for functional mobility. , The patient  was modified independent for basic and instrumental ADLs. , and The patient and/or family endorse recurrent falls within the last 6 months. HOME SUPPORT PRIOR TO ADMISSION: The patient lived alone with no local support. Sister lives in [facility] and plans to stay with patient for a short period once he returns home. Physical Therapy Goals  Initiated 8/31/2023  1. Patient will move from supine to sit and sit to supine, scoot up and down, and roll side to side in bed with modified independence within 7 day(s). 2.  Patient will perform sit to stand with modified independence within 7 day(s). 3.  Patient will transfer from bed to chair and chair to bed with supervision/set-up using the least restrictive device within 7 day(s). 4.  Patient will ambulate with standby assistance for 250 feet with the least restrictive device within 7 day(s). 5.  Patient will ascend/descend 4 stairs with 1 handrail(s) with supervision/set-up within 7 day(s). Outcome: Progressing   PHYSICAL THERAPY TREATMENT    Patient: Stacie Grayson (73 y.o. male)  Date: 9/1/2023  Diagnosis: TIA (transient ischemic attack) [G45.9] TIA (transient ischemic attack)      Precautions: Fall Risk, Up as Tolerated                    ASSESSMENT:  Patient continues to benefit from skilled PT services and is progressing towards goals. Patient received in bed and agreeable to participate, sister also present in room. Was able to come to sit EOB with SBA and sitting balance is intact. Ambulated x approx 100 feet including into bathroom to stand at sink for functional reaching activities.  Reported slight dizziness after standing at sink, BP dropped from 156/76 to 121/57, improved after seated rest break. Used hand held assist into bathroom, note gait with mild unsteadiness and slight posterior lean due to neuropathy in feet so used RW with CGA in hallway. Educated on safety and falls prevention and left up in chair with call bell in reach. Recommend HHPT with increased supervision at home, his sister has contacted private duty companies to arrange extra help as she cannot be there past Tuesday next week. PLAN:  Patient continues to benefit from skilled intervention to address the above impairments. Continue treatment per established plan of care. Recommendation for discharge: (in order for the patient to meet his/her long term goals): Therapy 2 days/week in the home and assist for safety    Other factors to consider for discharge: lives alone, available support system works or is unable to provide adequate supervision and the patient would be alone, impaired cognition, high risk for falls, not safe to be alone, and concern for safely navigating or managing the home environment    IF patient discharges home will need the following DME: rolling walker       SUBJECTIVE:   Patient stated, \"I was confused when I woke up here and I didn't know where I was. \"    OBJECTIVE DATA SUMMARY:   Critical Behavior:          Functional Mobility Training:  Bed Mobility:  Bed Mobility Training  Supine to Sit: Stand-by assistance  Scooting: Stand-by assistance  Transfers:  Transfer Training  Sit to Stand: Contact-guard assistance  Stand to Sit: Contact-guard assistance  Stand Pivot Transfers: Contact-guard assistance  Balance:  Balance  Sitting: Intact  Standing: Impaired  Standing - Static: Constant support;Good  Standing - Dynamic: Constant support;Occasional   Ambulation/Gait Training:     Gait  Overall Level of Assistance: Contact-guard assistance  Interventions: Verbal cues; Safety awareness training  Base of Support:

## 2023-09-02 VITALS
RESPIRATION RATE: 18 BRPM | WEIGHT: 182 LBS | HEIGHT: 71 IN | TEMPERATURE: 97.9 F | BODY MASS INDEX: 25.48 KG/M2 | SYSTOLIC BLOOD PRESSURE: 142 MMHG | OXYGEN SATURATION: 95 % | DIASTOLIC BLOOD PRESSURE: 62 MMHG | HEART RATE: 66 BPM

## 2023-09-02 LAB
ALBUMIN SERPL-MCNC: 2.7 G/DL (ref 3.5–5)
ALBUMIN/GLOB SERPL: 1 (ref 1.1–2.2)
ALP SERPL-CCNC: 81 U/L (ref 45–117)
ALT SERPL-CCNC: 134 U/L (ref 12–78)
ANION GAP SERPL CALC-SCNC: 7 MMOL/L (ref 5–15)
AST SERPL-CCNC: 354 U/L (ref 15–37)
BILIRUB DIRECT SERPL-MCNC: 0.2 MG/DL (ref 0–0.2)
BILIRUB SERPL-MCNC: 0.8 MG/DL (ref 0.2–1)
BUN SERPL-MCNC: 16 MG/DL (ref 6–20)
BUN/CREAT SERPL: 21 (ref 12–20)
CALCIUM SERPL-MCNC: 8 MG/DL (ref 8.5–10.1)
CHLORIDE SERPL-SCNC: 101 MMOL/L (ref 97–108)
CK SERPL-CCNC: 8670 U/L (ref 39–308)
CO2 SERPL-SCNC: 21 MMOL/L (ref 21–32)
CREAT SERPL-MCNC: 0.78 MG/DL (ref 0.7–1.3)
ERYTHROCYTE [DISTWIDTH] IN BLOOD BY AUTOMATED COUNT: 16.1 % (ref 11.5–14.5)
GLOBULIN SER CALC-MCNC: 2.8 G/DL (ref 2–4)
GLUCOSE SERPL-MCNC: 98 MG/DL (ref 65–100)
HCT VFR BLD AUTO: 27.9 % (ref 36.6–50.3)
HGB BLD-MCNC: 9.5 G/DL (ref 12.1–17)
MCH RBC QN AUTO: 32.3 PG (ref 26–34)
MCHC RBC AUTO-ENTMCNC: 34.1 G/DL (ref 30–36.5)
MCV RBC AUTO: 94.9 FL (ref 80–99)
NRBC # BLD: 0 K/UL (ref 0–0.01)
NRBC BLD-RTO: 0 PER 100 WBC
PLATELET # BLD AUTO: 122 K/UL (ref 150–400)
PMV BLD AUTO: 10.6 FL (ref 8.9–12.9)
POTASSIUM SERPL-SCNC: 3.8 MMOL/L (ref 3.5–5.1)
PROT SERPL-MCNC: 5.5 G/DL (ref 6.4–8.2)
RBC # BLD AUTO: 2.94 M/UL (ref 4.1–5.7)
SODIUM SERPL-SCNC: 129 MMOL/L (ref 136–145)
WBC # BLD AUTO: 13.1 K/UL (ref 4.1–11.1)

## 2023-09-02 PROCEDURE — 6370000000 HC RX 637 (ALT 250 FOR IP): Performed by: INTERNAL MEDICINE

## 2023-09-02 PROCEDURE — 2580000003 HC RX 258: Performed by: STUDENT IN AN ORGANIZED HEALTH CARE EDUCATION/TRAINING PROGRAM

## 2023-09-02 PROCEDURE — 6370000000 HC RX 637 (ALT 250 FOR IP): Performed by: STUDENT IN AN ORGANIZED HEALTH CARE EDUCATION/TRAINING PROGRAM

## 2023-09-02 PROCEDURE — 80076 HEPATIC FUNCTION PANEL: CPT

## 2023-09-02 PROCEDURE — 85027 COMPLETE CBC AUTOMATED: CPT

## 2023-09-02 PROCEDURE — 82550 ASSAY OF CK (CPK): CPT

## 2023-09-02 PROCEDURE — 80048 BASIC METABOLIC PNL TOTAL CA: CPT

## 2023-09-02 PROCEDURE — 36415 COLL VENOUS BLD VENIPUNCTURE: CPT

## 2023-09-02 PROCEDURE — 6360000002 HC RX W HCPCS: Performed by: STUDENT IN AN ORGANIZED HEALTH CARE EDUCATION/TRAINING PROGRAM

## 2023-09-02 RX ADMIN — Medication 100 MG: at 09:26

## 2023-09-02 RX ADMIN — SODIUM CHLORIDE, PRESERVATIVE FREE 10 ML: 5 INJECTION INTRAVENOUS at 09:27

## 2023-09-02 RX ADMIN — SODIUM CHLORIDE, PRESERVATIVE FREE 10 ML: 5 INJECTION INTRAVENOUS at 09:26

## 2023-09-02 RX ADMIN — Medication: at 09:26

## 2023-09-02 RX ADMIN — ENOXAPARIN SODIUM 40 MG: 100 INJECTION SUBCUTANEOUS at 09:26

## 2023-09-02 RX ADMIN — METOPROLOL SUCCINATE 25 MG: 25 TABLET, EXTENDED RELEASE ORAL at 09:26

## 2023-09-02 RX ADMIN — ASPIRIN 81 MG: 81 TABLET, COATED ORAL at 09:26

## 2023-09-02 RX ADMIN — GABAPENTIN 100 MG: 100 CAPSULE ORAL at 09:26

## 2023-09-02 RX ADMIN — THERA TABS 1 TABLET: TAB at 09:26

## 2023-09-02 ASSESSMENT — PAIN DESCRIPTION - FREQUENCY: FREQUENCY: CONTINUOUS

## 2023-09-02 ASSESSMENT — PAIN SCALES - GENERAL
PAINLEVEL_OUTOF10: 5
PAINLEVEL_OUTOF10: 0
PAINLEVEL_OUTOF10: 0

## 2023-09-02 ASSESSMENT — PAIN DESCRIPTION - PAIN TYPE: TYPE: CHRONIC PAIN

## 2023-09-02 ASSESSMENT — PAIN DESCRIPTION - LOCATION: LOCATION: BACK;HIP

## 2023-09-02 ASSESSMENT — PAIN - FUNCTIONAL ASSESSMENT: PAIN_FUNCTIONAL_ASSESSMENT: ACTIVITIES ARE NOT PREVENTED

## 2023-09-02 ASSESSMENT — PAIN DESCRIPTION - DESCRIPTORS: DESCRIPTORS: ACHING

## 2023-09-02 ASSESSMENT — PAIN DESCRIPTION - ONSET: ONSET: ON-GOING

## 2023-09-02 NOTE — PROGRESS NOTES
1900: Bedside and Verbal shift change report given to Kaylan Queen RN (oncoming nurse) by Osman Stephens RN (offgoing nurse). Report included the following information Nurse Handoff Report, Index, Adult Overview, Surgery Report, Intake/Output, MAR, Recent Results, Med Rec Status, and Cardiac Rhythm NSR .     2000: Shift assessment complete. Pt is A+Ox4, afebrile & denies pain. Pt NSR on monitor all pulses palpable. BLE edema present. Pt is on RA, lungs clear bilaterally. Abd is soft, non-tender, BS active. Pt is voiding. BUE dressings intact, BLE CLAUDIO, sacrum CLAUDIO venelex applied. 2130: Pt asking for something to help him sleep. Perfect serve sent to [facility], orders received for atarax. 0000: Reassessment complete.  Pt given prn tylenol for chronic back and hip pain, no acute changes    0400: Reassessment complete, no acute changes    0730: shift report given to oncoming nurse

## 2023-09-02 NOTE — PROGRESS NOTES
EOM's full, no nystagmus, no ptosis. Facial sensation is normal. Corneal reflexes are not tested. Facial movement is symmetric. Hearing is abnormal bilaterally. Palate is midline with normal sternocleidomastoid and trapezius muscles are normal. Tongue is midline. Neck without meningismus or bruits  Temporal arteries are not tender or enlarged  TMJ areas are not tender on palpation   Motor:  4/5 strength in upper and lower proximal and distal muscles. Normal bulk and tone. No fasciculations. Rapid alternating movement is symmetric and slow bilaterally   Reflexes:   Deep tendon reflexes 1+/4 and symmetrical.  No babinski or clonus present   Sensory:   Normal to touch, pinprick and vibration and temperature slightly decreased in both feet. DSS is intact   Gait:  Abnormal gait for patient's age, as he moves slowly with wide-based gait due to generalized weakness and needs contact-guard assistance. Tremor:   No tremor noted. Cerebellar:  Mildly abnormal cerebellar signs present on Romberg and tandem testing and finger-nose-finger exam.   Neurovascular:  Normal heart sounds and regular rhythm, peripheral pulses decreased, and no carotid bruits. Assessment:   [unfilled]  Principal Problem:    TIA (transient ischemic attack)  Active Problems:    Convulsive syncope    Acute alteration in mental status    Bilateral carotid artery stenosis    Cerebral microvascular disease    General weakness  Resolved Problems:    * No resolved hospital problems.  *      Plan:     Patient with unexplained prolonged syncopal episode and generalized weakness, etiology unclear, and is EEG last night respiratory normal, and he did not want to get another EEG today, and his CTA just shows moderate 50% stenosis of the left internal carotid artery that should not cause a problem, his head CT was normal, we will continue him on his aspirin tentatively, and follow his CPK elevations, but suspect this is probably due to some dehydration and hypotension from being outside all day. Patient is very hard of hearing. Patient has no focal deficits on exam.  We will check a carotid Doppler study and check a MRI scan of the brain, and see if there is any abnormal evidence of embolic disease to the brain or new stroke that could have led to some of this. Discussed in detail, 75 minutes spent reviewing his records reviewing his studies, numerous past history, going over his past records, reviewing his CTA, CT and EEG and labs. Patient's MRI scan was normal, and his carotid Dopplers were normal, and he had a normal EEG on admission, so this syncopal episode had to be due to his low sodium with generalized weakness and syncope and no neurologic problems, so we will follow as needed for now, went over the results of all this with the patient and explained his diagnosis prognosis, he is much better now that correcting his electrolytes and blood pressure, so we will follow him as needed, reviewed all his records again, and 52 minutes spent going over all this in detail with the patient and coordinating his care with the hospitalist.  We reviewed his Dopplers and his chart and MRI all in detail. We will follow him as needed for now, call if we can help.     Signed By: Joo Turpin MD     September 1, 2023       CC: Anish Porras MD  FAX: [phone]

## 2023-09-02 NOTE — DISCHARGE SUMMARY
stroke  Echo done       Lactic acidosis  Resolved     Hyponatremia  Likely due to alcohol use  Sodium level improved 129  Encourage patient to stop alcohol intake, resources for alcohol rehab given to the patient        Recurrent falls  CT chest showed only contusions on the anterior and midline chest wall  X-ray is unremarkable apart of left elbow chronic radiocarpal arthritis and scapholunate advanced collapse. 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 Right elbow x-ray was unremarkable but repeat imaging in 7 to 10 days recommended  PT OT     Syncope? We will get EEG/CVA work-up as above  Echo showed EF 40 to 45%, there is akinesis of the mid anterior and anterior lateral segments       RhabdoElevated CK   CK, trending down  Kidney function stable since admissions  Encourage patient to drink water at home     Elevated troponin was likely type II MI  To follow-up with cardiology as outpatient  High-dose of statin held due to elevated LFTs  Resume was 5 Mg of ruvastatin at home       Alcohol use  CIWA protocol symptoms triggers        Hypertension  Chronic atopic dermatitis        Ecchymosis  Right elbow  Left arm  Wound care, change dressing before discharge  Home health to follow      _______________________________________________________________________  Patient seen and examined by me on discharge day. Pertinent Findings:  Gen:    Not in distress  Chest: Clear lungs  CVS:   Regular rhythm.   No edema  Abd:  Soft, not distended, not tender  Neuro:  Alert,   _______________________________________________________________________  DISCHARGE MEDICATIONS:      Medication List        CONTINUE taking these medications      aspirin 81 MG EC tablet     docusate 100 MG Caps  Commonly known as: COLACE, DULCOLAX     gabapentin 100 MG capsule  Commonly known as: NEURONTIN     metoprolol tartrate 25 MG tablet  Commonly known as: LOPRESSOR     rosuvastatin 5 MG tablet  Commonly known as: CRESTOR     triamcinolone 0.1 % cream  Commonly known as: KENALOG                Patient Follow Up Instructions: Activity: activity as tolerated  Diet: cardiac diet  Wound Care: keep wound clean and dry                          Follow-up with  in 1 week. Follow-up tests/labs repeat BMP in 1 week    Follow-up Information    None       ________________________________________________________________    Risk of deterioration: Moderate    Condition at Discharge:  Stable  __________________________________________________________________    Disposition  Home with family and home health services    ____________________________________________________________________    Code Status: Full Code  ___________________________________________________________________      Total time in minutes spent coordinating this discharge (includes going over instructions, follow-up, prescriptions, and preparing report for sign off to her PCP) :  35 minutes    Signed:   Cam Ingram MD

## 2023-09-02 NOTE — PROGRESS NOTES
Received notification from bedside RN about patient with regards to: difficulty with sleep, requesting medication to help  VS: /66, HR 78, RR 18, O2 sat 100% on RA    Intervention given:   - Atarax 25 mg PO x 1 dose

## 2023-09-02 NOTE — CARE COORDINATION
Transition of Care Plan:     RUR: 16    Prior Level of Functioning: independent    Disposition: Home with At [address] [place] [address]    Patient lives alone. Sister to provide transportation home. Jose Ursula was provied. [facility] to provide OT PT SN. CM sent updates to them via [employer]. CM updated wound care notes to [facility]. Sister given a PCA list to look at in case they want to get extra help at home. CM also provided resources for [facility] to see if they could provide support. If SNF or IPR: Date FOC offered:   Date FOC received:   Accepting facility:   Date authorization started with reference number:   Date authorization received and expires: Follow up appointments: PCP and specialist Weekend CM unable to make appt. Family will schedule on Monday after Labor Day Holiday. DME needed: walker delivered  Transportation at discharge: sister  IM/IMM Medicare/ letter given: signed  Is patient a [place] and connected with [facility]? na              If yes, was [employer] transfer form completed and VA notified? Caregiver Contact: Jose M Christiansen (Other)   [phone] [facility]   Discharge Caregiver contacted prior to discharge? yes  Care Conference needed? n/a  Barriers to discharge: [address] [facility] [place], [address] [place] [address]     09/02/23 1324   Discharge Planning   Type of Residence House   Living Arrangements Children   Current Services Prior To Admission None   Potential [place]   DME Ordered? No   Potential Assistance Purchasing Medications No   Type of Home Care Services PT;OT   Patient expects to be discharged to: [facility] Discharge   Transition of Care Consult (CM Consult) Luis F No   Reason Outside Agency Chosen Patient already serviced by other home [address] [facility] Provided?  No   Mode of Transport at Discharge Other (see comment)   Confirm Follow

## 2023-09-02 NOTE — PROGRESS NOTES
Patient discharge home with family members. Discharge instruction given to patient and family members. Iv line removed pressure digressing applied. Bila elbow dressing change prior to discharge.

## 2023-09-03 LAB
BACTERIA SPEC CULT: NORMAL
BACTERIA SPEC CULT: NORMAL
SERVICE CMNT-IMP: NORMAL
SERVICE CMNT-IMP: NORMAL

## 2023-09-03 NOTE — PROGRESS NOTES
Physician Progress Note      Kimmie Rouse  [facility] #:                  [id number]  :                       1945  ADMIT DATE:       2023 6:16 AM  DISCH DATE:        2023 3:39 PM  RESPONDING  PROVIDER #:        Lena Kramer MD          QUERY TEXT:    Pt admitted with TIA. Noted documentation of NSTEMI type 2 on cardiology   consult PN by ordered cardiology consultant. If possible, please document in   progress notes and discharge summary:        The medical record reflects the following:  Risk Factors:  64 y/o male to ED after Fall, unsure how he fell, how long was   out. Pt. noted to have elevated Troponins. PMHx: HTN, HLD, LBBB, CAD, ETOH use    Clinical Indicators:   CV Consult PN: NSTEMI type 2 Trop elevation is likely type 2 in the   setting of Rhabdo (elevated CK), dehydration, contusion- no ischemic eval   needed for now.     CV MD PN: Increased hs troponin  No chest pain, increased hs troponin likely secondary to rhabdo, type II   manage medically. Treatment: Admit, hospitalist consult, cardiology consult, MRI, IVF, BMP, tele   monitoring, Orthostatic BP's, Echo, vitals per unit routine. Options provided:  -- NSTEMI type 2 confirmed present on admission  -- NSTEMI type 2 ruled out  -- Elevated troponins only  -- Other - I will add my own diagnosis  -- Disagree - Not applicable / Not valid  -- Disagree - Clinically unable to determine / Unknown  -- Refer to Clinical Documentation Reviewer    PROVIDER RESPONSE TEXT:    The diagnosis of NSTEMI type 2 was confirmed as present on admission. Query created by: Navjot Meza on 2023 4:31 PM      QUERY TEXT:    Pt admitted with syncope. Noted documentation of Acute Kidney injury (DEVON) on    CV PN by ordered Cardiology consultant.   If possible, please document in   progress notes and discharge summary:      The medical record reflects the following:  Risk Factors: 64 y/o male to ED after Fall,

## 2023-09-05 LAB
BACTERIA SPEC CULT: NORMAL
BACTERIA SPEC CULT: NORMAL
CENTROMERE B AB SER-ACNC: <0.2 AI (ref 0–0.9)
CHROMATIN AB SERPL-ACNC: <0.2 AI (ref 0–0.9)
DSDNA AB SER-ACNC: <1 IU/ML (ref 0–9)
ENA JO1 AB SER-ACNC: <0.2 AI (ref 0–0.9)
ENA RNP AB SER-ACNC: <0.2 AI (ref 0–0.9)
ENA SCL70 AB SER-ACNC: <0.2 AI (ref 0–0.9)
ENA SM AB SER-ACNC: <0.2 AI (ref 0–0.9)
ENA SM+RNP AB SER-ACNC: <0.2 AI (ref 0–0.9)
ENA SS-A AB SER-ACNC: <0.2 AI (ref 0–0.9)
ENA SS-B AB SER-ACNC: <0.2 AI (ref 0–0.9)
RIBOSOMAL P AB SER-ACNC: <0.2 AI (ref 0–0.9)
SEE BELOW:, 164879: NORMAL
SERVICE CMNT-IMP: NORMAL
SERVICE CMNT-IMP: NORMAL

## 2023-09-20 ENCOUNTER — OFFICE VISIT (OUTPATIENT)
Age: 78
End: 2023-09-20
Payer: MEDICARE

## 2023-09-20 VITALS
HEIGHT: 71 IN | RESPIRATION RATE: 16 BRPM | TEMPERATURE: 97.8 F | WEIGHT: 181.8 LBS | DIASTOLIC BLOOD PRESSURE: 72 MMHG | HEART RATE: 73 BPM | OXYGEN SATURATION: 99 % | SYSTOLIC BLOOD PRESSURE: 124 MMHG | BODY MASS INDEX: 25.45 KG/M2

## 2023-09-20 DIAGNOSIS — G62.9 SENSORY MOTOR NEUROPATHY: ICD-10-CM

## 2023-09-20 DIAGNOSIS — I65.23 BILATERAL CAROTID ARTERY STENOSIS: ICD-10-CM

## 2023-09-20 DIAGNOSIS — Z86.73 PERSONAL HISTORY OF TIA (TRANSIENT ISCHEMIC ATTACK): ICD-10-CM

## 2023-09-20 DIAGNOSIS — R55 SYNCOPE AND COLLAPSE: Primary | ICD-10-CM

## 2023-09-20 PROCEDURE — 99214 OFFICE O/P EST MOD 30 MIN: CPT | Performed by: NURSE PRACTITIONER

## 2023-09-20 PROCEDURE — 1123F ACP DISCUSS/DSCN MKR DOCD: CPT | Performed by: NURSE PRACTITIONER

## 2023-09-20 ASSESSMENT — ENCOUNTER SYMPTOMS: TROUBLE SWALLOWING: 0

## 2023-09-26 ENCOUNTER — TELEPHONE (OUTPATIENT)
Age: 78
End: 2023-09-26

## 2023-09-26 NOTE — TELEPHONE ENCOUNTER
Pt's sister, Aris Berger, called stating that Pt is out of his Gabapentin as the pharmacy has been waiting for us to approve of the refill. Advised I did not see any refill requests and would send an urgent message for refill. [facility] pharmacy on [facility].  ([phone])

## 2023-09-27 DIAGNOSIS — G62.9 SENSORY MOTOR NEUROPATHY: Primary | ICD-10-CM

## 2023-09-27 RX ORDER — GABAPENTIN 100 MG/1
100 CAPSULE ORAL 2 TIMES DAILY
Qty: 180 CAPSULE | Refills: 1 | Status: SHIPPED | OUTPATIENT
Start: 2023-09-27 | End: 2023-12-26

## 2024-06-06 ENCOUNTER — OFFICE VISIT (OUTPATIENT)
Age: 79
End: 2024-06-06
Payer: MEDICARE

## 2024-06-06 VITALS
BODY MASS INDEX: 27.23 KG/M2 | HEIGHT: 70 IN | RESPIRATION RATE: 18 BRPM | WEIGHT: 190.2 LBS | HEART RATE: 101 BPM | SYSTOLIC BLOOD PRESSURE: 120 MMHG | OXYGEN SATURATION: 97 % | DIASTOLIC BLOOD PRESSURE: 70 MMHG

## 2024-06-06 DIAGNOSIS — G62.9 PERIPHERAL POLYNEUROPATHY: Primary | ICD-10-CM

## 2024-06-06 DIAGNOSIS — Z87.898 HISTORY OF SYNCOPE: ICD-10-CM

## 2024-06-06 DIAGNOSIS — Z86.73 HISTORY OF TIA (TRANSIENT ISCHEMIC ATTACK): ICD-10-CM

## 2024-06-06 PROCEDURE — 1123F ACP DISCUSS/DSCN MKR DOCD: CPT | Performed by: NURSE PRACTITIONER

## 2024-06-06 PROCEDURE — G8419 CALC BMI OUT NRM PARAM NOF/U: HCPCS | Performed by: NURSE PRACTITIONER

## 2024-06-06 PROCEDURE — 99214 OFFICE O/P EST MOD 30 MIN: CPT | Performed by: NURSE PRACTITIONER

## 2024-06-06 PROCEDURE — 1036F TOBACCO NON-USER: CPT | Performed by: NURSE PRACTITIONER

## 2024-06-06 PROCEDURE — G8427 DOCREV CUR MEDS BY ELIG CLIN: HCPCS | Performed by: NURSE PRACTITIONER

## 2024-06-06 RX ORDER — GABAPENTIN 300 MG/1
300 CAPSULE ORAL NIGHTLY
Qty: 30 CAPSULE | Refills: 3 | Status: SHIPPED | OUTPATIENT
Start: 2024-06-06 | End: 2024-07-06

## 2024-06-06 ASSESSMENT — ENCOUNTER SYMPTOMS
BACK PAIN: 1
TROUBLE SWALLOWING: 0

## 2024-06-06 ASSESSMENT — PATIENT HEALTH QUESTIONNAIRE - PHQ9
SUM OF ALL RESPONSES TO PHQ QUESTIONS 1-9: 0
2. FEELING DOWN, DEPRESSED OR HOPELESS: NOT AT ALL
1. LITTLE INTEREST OR PLEASURE IN DOING THINGS: NOT AT ALL
SUM OF ALL RESPONSES TO PHQ9 QUESTIONS 1 & 2: 0
SUM OF ALL RESPONSES TO PHQ QUESTIONS 1-9: 0

## 2024-06-06 NOTE — PROGRESS NOTES
Chief Complaint   Patient presents with    Neurologic Problem     Follow up for neuropathy - no better no worse - feet and right hand     Insomnia     Has not had 6 hours of sleep in the last 6 nights- twitches a lot at night   States he falls asleep for 15 min and a lot of times that's all he gets     1. Have you been to the ER, urgent care clinic since your last visit?  Hospitalized since your last visit? No     2. Have you seen or consulted any other health care providers outside of the [facility] since your last visit?  Include any pap smears or colon screening.  No     
if left on for long period.  Tested negative for latex allergy)     Past Medical History:   Diagnosis Date    Arthritis     back,neck,shoulders    Arthritis     CAD (coronary artery disease)     per 10/2011 cardiology note    Cancer (HCC) 10/01/2011    prostate    Cardiomyopathy, alcoholic (HCC)     per 10/2011 cardiology note    Epilepsy (HCC) 10/2022    Essential hypertension     Hypercholesteremia     Hypertension     Neuropathy     Other ill-defined conditions(799.89)     elevated cholesterol    Syncope and collapse       Past Surgical History:   Procedure Laterality Date    COLONOSCOPY N/A 11/8/2016    COLONOSCOPY performed by Dameon Menendez MD at [facility] ENDOSCOPY    HEENT      tonsillectomy    ORTHOPEDIC SURGERY      left ankle plate & screws    OTHER SURGICAL HISTORY      colonoscopy    MT UNLISTED PROCEDURE CARDIAC SURGERY      cardiac cath x2 normal,2003    PROSTATE BIOPSY, NEEDLE, SATURATION SAMPLING      08/17/2011    PROSTATE SURGERY      PROSTATECTOMY  10/19/11    ROBOTIC ASSISTED LAPAROSCOPIC PROSTATECTOMY WITH LEFT PELVIC LYMPH NODE DISSECTION     Past Surgical History:   Procedure Laterality Date    COLONOSCOPY N/A 11/8/2016    COLONOSCOPY performed by Dameon Menendez MD at [facility] ENDOSCOPY    HEENT      tonsillectomy    ORTHOPEDIC SURGERY      left ankle plate & screws    OTHER SURGICAL HISTORY      colonoscopy    MT UNLISTED PROCEDURE CARDIAC SURGERY      cardiac cath x2 normal,2003    PROSTATE BIOPSY, NEEDLE, SATURATION SAMPLING      08/17/2011    PROSTATE SURGERY      PROSTATECTOMY  10/19/11    ROBOTIC ASSISTED LAPAROSCOPIC PROSTATECTOMY WITH LEFT PELVIC LYMPH NODE DISSECTION      Family History   Problem Relation Age of Onset    Seizures Mother     Stroke Mother     Heart Disease Mother     Cancer Mother       MRI Result (most recent):  MRI BRAIN WO CONTRAST 08/31/2023    Narrative  INDICATION:   Rule out stroke    EXAMINATION:  MRI BRAIN WO CONTRAST    COMPARISON:  CTA

## 2024-11-13 DIAGNOSIS — G62.9 PERIPHERAL POLYNEUROPATHY: ICD-10-CM

## 2024-11-14 RX ORDER — GABAPENTIN 300 MG/1
CAPSULE ORAL
Qty: 30 CAPSULE | Refills: 2 | Status: SHIPPED | OUTPATIENT
Start: 2024-11-14 | End: 2025-02-12

## 2024-12-06 ENCOUNTER — OFFICE VISIT (OUTPATIENT)
Age: 79
End: 2024-12-06
Payer: MEDICARE

## 2024-12-06 VITALS
HEIGHT: 70 IN | DIASTOLIC BLOOD PRESSURE: 60 MMHG | BODY MASS INDEX: 27.39 KG/M2 | SYSTOLIC BLOOD PRESSURE: 114 MMHG | WEIGHT: 191.3 LBS | OXYGEN SATURATION: 96 % | HEART RATE: 74 BPM | RESPIRATION RATE: 18 BRPM

## 2024-12-06 DIAGNOSIS — Z86.73 HISTORY OF TIA (TRANSIENT ISCHEMIC ATTACK): ICD-10-CM

## 2024-12-06 DIAGNOSIS — G62.9 SENSORY MOTOR NEUROPATHY: Primary | ICD-10-CM

## 2024-12-06 DIAGNOSIS — Z87.898 HISTORY OF SYNCOPE: ICD-10-CM

## 2024-12-06 PROCEDURE — G8484 FLU IMMUNIZE NO ADMIN: HCPCS | Performed by: NURSE PRACTITIONER

## 2024-12-06 PROCEDURE — 1123F ACP DISCUSS/DSCN MKR DOCD: CPT | Performed by: NURSE PRACTITIONER

## 2024-12-06 PROCEDURE — 1126F AMNT PAIN NOTED NONE PRSNT: CPT | Performed by: NURSE PRACTITIONER

## 2024-12-06 PROCEDURE — 1159F MED LIST DOCD IN RCRD: CPT | Performed by: NURSE PRACTITIONER

## 2024-12-06 PROCEDURE — 1160F RVW MEDS BY RX/DR IN RCRD: CPT | Performed by: NURSE PRACTITIONER

## 2024-12-06 PROCEDURE — 99214 OFFICE O/P EST MOD 30 MIN: CPT | Performed by: NURSE PRACTITIONER

## 2024-12-06 PROCEDURE — G8419 CALC BMI OUT NRM PARAM NOF/U: HCPCS | Performed by: NURSE PRACTITIONER

## 2024-12-06 PROCEDURE — 1036F TOBACCO NON-USER: CPT | Performed by: NURSE PRACTITIONER

## 2024-12-06 PROCEDURE — G8427 DOCREV CUR MEDS BY ELIG CLIN: HCPCS | Performed by: NURSE PRACTITIONER

## 2024-12-06 RX ORDER — DIPHENHYDRAMINE HCL 25 MG
25 CAPSULE ORAL EVERY 6 HOURS PRN
COMMUNITY

## 2024-12-06 RX ORDER — GABAPENTIN 300 MG/1
300 CAPSULE ORAL NIGHTLY
Qty: 90 CAPSULE | Refills: 3 | Status: SHIPPED | OUTPATIENT
Start: 2024-12-06 | End: 2025-03-06

## 2024-12-06 ASSESSMENT — PATIENT HEALTH QUESTIONNAIRE - PHQ9
SUM OF ALL RESPONSES TO PHQ QUESTIONS 1-9: 0
SUM OF ALL RESPONSES TO PHQ9 QUESTIONS 1 & 2: 0
1. LITTLE INTEREST OR PLEASURE IN DOING THINGS: NOT AT ALL
SUM OF ALL RESPONSES TO PHQ QUESTIONS 1-9: 0
2. FEELING DOWN, DEPRESSED OR HOPELESS: NOT AT ALL

## 2024-12-06 NOTE — PROGRESS NOTES
Chief Complaint   Patient presents with    Neurologic Problem     Follow up for neuropathy - doesn't feel to much difference in his feet - left Toe was throbbing last night - left hand numb      1. Have you been to the ER, urgent care clinic since your last visit?  Hospitalized since your last visit? No     2. Have you seen or consulted any other health care providers outside of the [facility] since your last visit?  Include any pap smears or colon screening.  No

## 2024-12-06 NOTE — PROGRESS NOTES
[facility]  [address]  [address]  [place] [address]  Tel: [phone]  Fax: [phone]      Date:  24     Name:  JUAN DIEGO VALENCIA JR.  :  1945  MRN:  [id number]     PCP:  Shorty Schulz MD    Chief Complaint   Patient presents with    Neurologic Problem     Follow up for neuropathy - doesn't feel to much difference in his feet - left Toe was throbbing last night - left hand numb        HISTORY OF PRESENT ILLNESS:  Patient presents today for regular follow up appt, last seenJun2024 for peripheral neuropathy.  Patient has previously had EMG confirming this in .  Patient notes things are about the same.  He feels that the numbness in his feet is about the same.  He notes that his Right hand bothers him some, it feels cold and tingling, feels rough.  Notes some Left big toe joint pain last night, took a Tylenol and it helped.  Patient does note he has a history of gout.  No falls, no balance, limited exercise.   Gabapentin 100mg increased to 300mg in , he does feel as though it is helping.  Not currently taking ASA as he has been suffering from an itchy rash for several months, he had  has an appt with Dermatology .  They have been trying to minimize medications to see if it is a drug reaction.  PCP Dr Schulz.    Recap from last visit :   1.  Peripheral polyneuropathy: Patient has previously had EMG testing, results been reviewed and discussed.  Patient has been taking gabapentin 100 mg at bedtime, I have increased his dosage to 300 mg at bedtime to see if that lessens some of his symptoms to improve his sleep.  Patient is to continue with home exercise program, consider physical therapy.  2.  History of syncope: All testing from hospitalization were reviewed, at last visit we discussed a 24-hour EEG, this been deferred.  Patient has had no recurrent events.  Patient is aware to maintain adequate hydration.  Please follow-up with primary

## 2025-06-13 ENCOUNTER — HOSPITAL ENCOUNTER (EMERGENCY)
Facility: HOSPITAL | Age: 80
Discharge: HOME | End: 2025-06-13
Payer: MEDICARE

## 2025-06-13 VITALS
OXYGEN SATURATION: 98 % | BODY MASS INDEX: 27.93 KG/M2 | SYSTOLIC BLOOD PRESSURE: 145 MMHG | DIASTOLIC BLOOD PRESSURE: 80 MMHG | RESPIRATION RATE: 18 BRPM | HEIGHT: 70 IN | WEIGHT: 195.11 LBS | TEMPERATURE: 97.7 F | HEART RATE: 85 BPM

## 2025-06-13 DIAGNOSIS — L03.90 CELLULITIS, UNSPECIFIED CELLULITIS SITE: ICD-10-CM
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
## 2025-06-13 DIAGNOSIS — Z20.3 NEED FOR POST EXPOSURE PROPHYLAXIS FOR RABIES: ICD-10-CM

## 2025-06-13 PROCEDURE — 90471 IMMUNIZATION ADMIN: CPT | Performed by: PHYSICIAN ASSISTANT

## 2025-06-13 PROCEDURE — 90375 RABIES IG IM/SC: CPT | Performed by: PHYSICIAN ASSISTANT

## 2025-06-13 PROCEDURE — 90675 RABIES VACCINE IM: CPT | Performed by: PHYSICIAN ASSISTANT

## 2025-06-13 PROCEDURE — 99284 EMERGENCY DEPT VISIT MOD MDM: CPT

## 2025-06-13 PROCEDURE — 6360000002 HC RX W HCPCS: Performed by: PHYSICIAN ASSISTANT

## 2025-06-13 PROCEDURE — 96372 THER/PROPH/DIAG INJ SC/IM: CPT

## 2025-06-13 RX ADMIN — RABIES IMMUNE GLOBULIN (HUMAN) 270 UNITS: 300 INJECTION, SOLUTION INFILTRATION; INTRAMUSCULAR at 13:14

## 2025-06-13 RX ADMIN — RABIES IMMUNE GLOBULIN (HUMAN) 1500 UNITS: 300 INJECTION, SOLUTION INFILTRATION; INTRAMUSCULAR at 13:14

## 2025-06-13 RX ADMIN — RABIES VACCINE 1 ML: KIT at 13:14

## 2025-06-13 ASSESSMENT — PAIN - FUNCTIONAL ASSESSMENT: PAIN_FUNCTIONAL_ASSESSMENT: 0-10

## 2025-06-13 NOTE — DISCHARGE INSTRUCTIONS
Indication: Rabies postexposure prophylaxis  Prescription date: 6/13/2025   Time: 1:28 PM    Day 0 -  6/13/2025 - Given in ED  Day 3 -  6/16/2025  Day 7-   6/20/2025  Day 14- 6/27/2025

## 2025-06-13 NOTE — ED PROVIDER NOTES
[facility] EMERGENCY DEPARTMENT  EMERGENCY DEPARTMENT ENCOUNTER       Pt Name: Brayden Fang Jr.  MRN: [id number]  Birthdate 1945  Date of evaluation: 6/13/2025  Provider: MIKI Perea   PCP: Shorty Schulz MD  Note Started: 1:15 PM EDT 6/13/25     CHIEF COMPLAINT       Chief Complaint   Patient presents with    Animal Bite     Pt was bit by stray cat to L hand x Monday and [facility], given TDAP shot, rx for Augmentin and told to come to ED for rabies shot.         HISTORY OF PRESENT ILLNESS: 1 or more elements      History From: Patient  None     Brayden Fang Jr. is a 79 y.o. male with history as noted below, who presents to the ED for [facility].  Patient had a cat bite from an unknown cat on Monday.  He was given Augmentin there today and updated tetanus shot was advised to come here to the emergency department for rabies series vaccination.  Endorses localized redness and generalized soreness denies any significant areas of pain.  Denies any extremity numbness, weakness, or tingling.  Denies fevers, chest pain, shortness of breath.  States he is otherwise asymptomatic and in his usual state of health     Nursing Notes were all reviewed and agreed with or any disagreements were addressed in the HPI.     REVIEW OF SYSTEMS      Review of Systems   All other systems reviewed and are negative.       Positives and Pertinent negatives as per HPI.    PAST HISTORY     Past Medical History:  Past Medical History:   Diagnosis Date    Arthritis     back,neck,shoulders    Arthritis     CAD (coronary artery disease)     per 10/2011 cardiology note    Cancer (HCC) 10/01/2011    prostate    Cardiomyopathy, alcoholic (HCC)     per 10/2011 cardiology note    Epilepsy (HCC) 10/2022    Essential hypertension     Hypercholesteremia     Hypertension     Neuropathy     Other ill-defined conditions(799.89)     elevated cholesterol    Syncope    PROCEDURES   Unless otherwise noted below, none  Procedures     CRITICAL CARE TIME       EMERGENCY DEPARTMENT COURSE and DIFFERENTIAL DIAGNOSIS/MDM   Vitals:    Vitals:    06/13/25 1230   BP: (!) 145/80   Pulse: 94   Resp: 18   Temp: 97.7 °F (36.5 °C)   SpO2: 96%   Weight: 88.5 kg (195 lb 1.7 oz)   Height: 1.778 m (5' 10\")        Patient was given the following medications:  Medications   rabies vaccine, PCEC (RABAVERT) injection 1 mL (has no administration in time range)   rabies immune globulin 1500 UNIT/5ML injection 1,500 Units (1,500 Units IntraMUSCular Given 6/13/25 1314)     And   rabies immune globulin 300 UNIT/ML injection 270 Units (270 Units IntraMUSCular Given 6/13/25 1314)       CONSULTS: (Who and What was discussed)  None    Chronic Conditions: as noted above     Social Determinants affecting Dx or Tx: None    Records Reviewed (source and summary of external records): Nursing Notes and Old Medical Records    MDM (CC/HPI Summary, DDx, ED Course, Reassessment, Disposition Considerations -Tests not done, Shared Decision Making, Pt Expectation of Test or Tx.):          Neurovascularly intact, ROM intact   Afebrile   No tenderness to joint                     FINAL IMPRESSION   No diagnosis found.      DISPOSITION/PLAN   DISPOSITION                 {Disposition (Optional):78666}     PATIENT REFERRED TO:  No follow-up provider specified.      DISCHARGE MEDICATIONS:     Medication List        ASK your doctor about these medications      ALLEGRA PO     aspirin 81 MG EC tablet     diphenhydrAMINE 25 MG capsule  Commonly known as: BENADRYL     gabapentin 300 MG capsule  Commonly known as: NEURONTIN  Take 1 capsule by mouth at bedtime for 90 days. Max Daily Amount: 300 mg     metoprolol tartrate 25 MG tablet  Commonly known as: LOPRESSOR     rosuvastatin 5 MG tablet  Commonly known as: CRESTOR     triamcinolone 0.1 % cream  Commonly known as: KENALOG                DISCONTINUED MEDICATIONS:  Current Discharge

## 2025-06-13 NOTE — ED NOTES
DC papers reviewed and in hand, pt and family verbalized understanding. Patient ambulatory out of ED with steady gait, no acute distress noted.

## 2025-06-16 ENCOUNTER — HOSPITAL ENCOUNTER (EMERGENCY)
Facility: HOSPITAL | Age: 80
Discharge: HOME | End: 2025-06-16
Payer: MEDICARE

## 2025-06-16 VITALS
SYSTOLIC BLOOD PRESSURE: 174 MMHG | TEMPERATURE: 97.9 F | DIASTOLIC BLOOD PRESSURE: 92 MMHG | RESPIRATION RATE: 17 BRPM | OXYGEN SATURATION: 100 % | HEART RATE: 104 BPM

## 2025-06-16 DIAGNOSIS — Z23 NEED FOR PROPHYLACTIC VACCINATION AND INOCULATION AGAINST RABIES: Primary | ICD-10-CM

## 2025-06-16 PROCEDURE — 90471 IMMUNIZATION ADMIN: CPT | Performed by: PHYSICIAN ASSISTANT

## 2025-06-16 PROCEDURE — 6360000002 HC RX W HCPCS: Performed by: PHYSICIAN ASSISTANT

## 2025-06-16 PROCEDURE — 90675 RABIES VACCINE IM: CPT | Performed by: PHYSICIAN ASSISTANT

## 2025-06-16 PROCEDURE — 99284 EMERGENCY DEPT VISIT MOD MDM: CPT

## 2025-06-16 PROCEDURE — 96372 THER/PROPH/DIAG INJ SC/IM: CPT

## 2025-06-16 PROCEDURE — 4500000002 HC ER NO CHARGE

## 2025-06-16 RX ADMIN — RABIES VACCINE 1 ML: KIT at 11:09

## 2025-06-16 ASSESSMENT — LIFESTYLE VARIABLES
HOW MANY STANDARD DRINKS CONTAINING ALCOHOL DO YOU HAVE ON A TYPICAL DAY: PATIENT DOES NOT DRINK
HOW OFTEN DO YOU HAVE A DRINK CONTAINING ALCOHOL: NEVER

## 2025-06-16 ASSESSMENT — PAIN SCALES - GENERAL: PAINLEVEL_OUTOF10: 0

## 2025-06-16 NOTE — ED PROVIDER NOTES
08/17/2011    PROSTATE SURGERY      PROSTATECTOMY  10/19/11    ROBOTIC ASSISTED LAPAROSCOPIC PROSTATECTOMY WITH LEFT PELVIC LYMPH NODE DISSECTION       Family History:  Family History   Problem Relation Age of Onset    Seizures Mother     Stroke Mother     Heart Disease Mother     Cancer Mother        Social History:  Social History     Tobacco Use    Smoking status: Never    Smokeless tobacco: Never   Vaping Use    Vaping status: Never Used   Substance Use Topics    Alcohol use: Not Currently    Drug use: Never       Allergies:  Allergies   Allergen Reactions    Adhesive Tape Itching     bandaide causes itching,irritates skin  (9/1/15 - patient states that bandaids only cause irritation if left on for long period.  Tested negative for latex allergy)       CURRENT MEDICATIONS      Previous Medications    ASPIRIN 81 MG EC TABLET    Take 1 tablet by mouth daily    DIPHENHYDRAMINE (BENADRYL) 25 MG CAPSULE    Take 1 capsule by mouth every 6 hours as needed for Itching    FEXOFENADINE HCL (ALLEGRA PO)    Take by mouth as needed (itching)    GABAPENTIN (NEURONTIN) 300 MG CAPSULE    Take 1 capsule by mouth at bedtime for 90 days. Max Daily Amount: 300 mg    METOPROLOL TARTRATE (LOPRESSOR) 25 MG TABLET    Take 1 tablet by mouth daily    RABIES VACCINE, PCEC (RABAVERT) INJECTION    Inject 1 mL into the muscle See Admin Instructions To be administered by a pharmacist, health department or outpatient department in 3 days.    RABIES VACCINE, PCEC (RABAVERT) INJECTION    Inject 1 mL into the muscle See Admin Instructions To be administered by a pharmacist, health department or outpatient department in 7 days.    RABIES VACCINE, PCEC (RABAVERT) INJECTION    Inject 1 mL into the muscle See Admin Instructions To be administered by a pharmacist, health department or outpatient department in 14 days.    ROSUVASTATIN (CRESTOR) 5 MG TABLET    Take 1 tablet by mouth three times a week Monday - Wednesday and Friday    TRIAMCINOLONE

## 2025-06-20 ENCOUNTER — HOSPITAL ENCOUNTER (EMERGENCY)
Facility: HOSPITAL | Age: 80
Discharge: HOME | End: 2025-06-20
Payer: MEDICARE

## 2025-06-20 VITALS
OXYGEN SATURATION: 98 % | SYSTOLIC BLOOD PRESSURE: 168 MMHG | RESPIRATION RATE: 18 BRPM | TEMPERATURE: 97.5 F | HEART RATE: 98 BPM | DIASTOLIC BLOOD PRESSURE: 72 MMHG

## 2025-06-20 DIAGNOSIS — Z23 NEED FOR PROPHYLACTIC VACCINATION AND INOCULATION AGAINST RABIES: Primary | ICD-10-CM

## 2025-06-20 PROCEDURE — 99284 EMERGENCY DEPT VISIT MOD MDM: CPT

## 2025-06-20 PROCEDURE — 90675 RABIES VACCINE IM: CPT | Performed by: EMERGENCY MEDICINE

## 2025-06-20 PROCEDURE — 90471 IMMUNIZATION ADMIN: CPT | Performed by: EMERGENCY MEDICINE

## 2025-06-20 PROCEDURE — 6360000002 HC RX W HCPCS: Performed by: EMERGENCY MEDICINE

## 2025-06-20 PROCEDURE — 96372 THER/PROPH/DIAG INJ SC/IM: CPT

## 2025-06-20 RX ADMIN — RABIES VACCINE 1 ML: KIT at 11:01

## 2025-06-20 ASSESSMENT — PAIN - FUNCTIONAL ASSESSMENT: PAIN_FUNCTIONAL_ASSESSMENT: NONE - DENIES PAIN

## 2025-06-20 NOTE — ED PROVIDER NOTES
[facility] EMERGENCY DEPARTMENT  EMERGENCY DEPARTMENT ENCOUNTER       Pt Name: Brayden Fang Jr.  MRN: [id number]  Birthdate 1945  Date of Evaluation: 6/20/2025  Provider: Aretha Stevens PA-C   PCP: Shorty Schulz MD  Note Started: 10:46 AM 6/20/25     CHIEF COMPLAINT       Chief Complaint   Patient presents with    Immunizations     Patient ambulatory to triage reporting he is here to receive his 3rd rabies vaccine. Denies other complaints.         HISTORY OF PRESENT ILLNESS: 1 or more elements      History From: Patient  None     Brayden Fang Jr. is a 79 y.o. male who presents to the ED today for the third of the rabies series.  Also concerned as he is states that he noticed some pus from the wound.  However no redness or fever.     Nursing Notes were all reviewed and agreed with or any disagreements were addressed in the HPI.     REVIEW OF SYSTEMS      Review of Systems     Positives and Pertinent negatives as per HPI.    PAST HISTORY     Past Medical History:  Past Medical History:   Diagnosis Date    Arthritis     back,neck,shoulders    Arthritis     CAD (coronary artery disease)     per 10/2011 cardiology note    Cancer (HCC) 10/01/2011    prostate    Cardiomyopathy, alcoholic (HCC)     per 10/2011 cardiology note    Epilepsy (HCC) 10/2022    Essential hypertension     Hypercholesteremia     Hypertension     Neuropathy     Other ill-defined conditions(799.89)     elevated cholesterol    Syncope and collapse        Past Surgical History:  Past Surgical History:   Procedure Laterality Date    COLONOSCOPY N/A 11/8/2016    COLONOSCOPY performed by Dameon Menendez MD at [facility] ENDOSCOPY    HEENT      tonsillectomy    ORTHOPEDIC SURGERY      left ankle plate & screws    OTHER SURGICAL HISTORY      colonoscopy    [place] UNLISTED PROCEDURE CARDIAC SURGERY      cardiac cath x2 normal,2003    PROSTATE BIOPSY, NEEDLE, SATURATION SAMPLING      08/17/2011    PROSTATE SURGERY      PROSTATECTOMY   (Optional):14669}     PATIENT REFERRED TO:  [facility] Emergency Department  [address] [id number] [phone]        Shorty Schulz MD  [address] [place] [id number] [phone]             DISCHARGE MEDICATIONS:     Medication List        START taking these medications      amoxicillin-clavulanate 875-125 MG per tablet  Commonly known as: AUGMENTIN  Take 1 tablet by mouth 2 times daily for 5 days            ASK your doctor about these medications      ALLEGRA PO     aspirin 81 MG EC tablet     diphenhydrAMINE 25 MG capsule  Commonly known as: BENADRYL     gabapentin 300 MG capsule  Commonly known as: NEURONTIN  Take 1 capsule by mouth at bedtime for 90 days. Max Daily Amount: 300 mg     metoprolol tartrate 25 MG tablet  Commonly known as: LOPRESSOR     * rabies vaccine, PCEC injection  Commonly known as: RABAVERT  Inject 1 mL into the muscle See Admin Instructions To be administered by a pharmacist, health department or outpatient department in 3 days.     * rabies vaccine, PCEC injection  Commonly known as: RABAVERT  Inject 1 mL into the muscle See Admin Instructions To be administered by a pharmacist, health department or outpatient department in 7 days.     * rabies vaccine, PCEC injection  Commonly known as: RABAVERT  Inject 1 mL into the muscle See Admin Instructions To be administered by a pharmacist, health department or outpatient department in 14 days.  Start taking on: June 27, 2025     rosuvastatin 5 MG tablet  Commonly known as: CRESTOR     triamcinolone 0.1 % cream  Commonly known as: KENALOG           * This list has 3 medication(s) that are the same as other medications prescribed for you. Read the directions carefully, and ask your doctor or other care provider to review them with you.                   Where to Get Your Medications        These medications were sent to [id number] [address] [place] [address]

## 2025-06-27 ENCOUNTER — HOSPITAL ENCOUNTER (EMERGENCY)
Facility: HOSPITAL | Age: 80
Discharge: HOME | End: 2025-06-27
Payer: MEDICARE

## 2025-06-27 VITALS
DIASTOLIC BLOOD PRESSURE: 68 MMHG | RESPIRATION RATE: 17 BRPM | HEART RATE: 100 BPM | OXYGEN SATURATION: 100 % | TEMPERATURE: 97.7 F | SYSTOLIC BLOOD PRESSURE: 129 MMHG

## 2025-06-27 DIAGNOSIS — Z23 NEED FOR RABIES VACCINATION: Primary | ICD-10-CM

## 2025-06-27 PROCEDURE — 90675 RABIES VACCINE IM: CPT | Performed by: PHYSICIAN ASSISTANT

## 2025-06-27 PROCEDURE — 90471 IMMUNIZATION ADMIN: CPT | Performed by: PHYSICIAN ASSISTANT

## 2025-06-27 PROCEDURE — 6360000002 HC RX W HCPCS: Performed by: PHYSICIAN ASSISTANT

## 2025-06-27 PROCEDURE — 4500000002 HC ER NO CHARGE

## 2025-06-27 RX ADMIN — RABIES VACCINE 1 ML: KIT at 12:01

## 2025-06-27 ASSESSMENT — LIFESTYLE VARIABLES
HOW OFTEN DO YOU HAVE A DRINK CONTAINING ALCOHOL: NEVER
HOW MANY STANDARD DRINKS CONTAINING ALCOHOL DO YOU HAVE ON A TYPICAL DAY: PATIENT DOES NOT DRINK

## 2025-06-27 NOTE — ED PROVIDER NOTES
interpretation of EKG.      RADIOLOGY:  Non-plain film images such as CT, Ultrasound and MRI are read by the radiologist. Plain radiographic images are visualized and preliminarily interpreted by the ED Provider with the below findings:          Interpretation per the Radiologist below, if available at the time of this note:     No orders to display        PROCEDURES   Unless otherwise noted below, none  Procedures     CRITICAL CARE TIME       EMERGENCY DEPARTMENT COURSE and DIFFERENTIAL DIAGNOSIS/MDM   Vitals:    Vitals:    06/27/25 1132   BP: 129/68   Pulse: 100   Resp: 17   Temp: 97.7 °F (36.5 °C)   SpO2: 100%        Patient was given the following medications:  Medications   rabies vaccine, PCEC (RABAVERT) injection 1 mL (1 mL IntraMUSCular Given 6/27/25 1201)       CONSULTS: (Who and What was discussed)  None    Chronic Conditions: as noted above     Social Determinants affecting Dx or Tx: None    Records Reviewed (source and summary of external records): Nursing Notes and Old Medical Records    MDM (CC/HPI Summary, DDx, ED Course, Reassessment, Disposition Considerations -Tests not done, Shared Decision Making, Pt Expectation of Test or Tx.):          Patient is a pleasant 78 yo male who presents to the ED for evaluation of 4th rabies series vaccination. Patient is asymptomatic at this time and states he is otherwise in his usual state of health.       Patient is asymptomatic.    No evidence of emergent conditions requiring further evaluation of management acutely here at this time.                 FINAL IMPRESSION     1. Need for rabies vaccination          DISPOSITION/PLAN   DISPOSITION Decision To Discharge 06/27/2025 11:53:52 AM   DISPOSITION CONDITION Stable           Discharge Note: The patient is stable for discharge home. The signs, symptoms, diagnosis, and discharge instructions have been discussed, understanding conveyed, and agreed upon. The patient is to follow up as recommended or return to ER

## 2025-06-27 NOTE — ED NOTES
Patient discharged by Fuad LIVINGSTON - pt sent to the front [facility], with strong and steady gait, no acute distress noted at time of discharge - Discharge information / home RX / and reasons to return to the ED were reviewed by the ED provider.      Pt's spouse at side for comfort care and for a ride home.

## 2025-06-27 NOTE — DISCHARGE INSTRUCTIONS
Thank You!    It was a pleasure taking care of you in our Emergency Department today. We know that when you come to [facility], you are entrusting us with your health, comfort, and safety. Our clinicians honor that trust, and truly appreciate the opportunity to care for you and your loved ones.    If you receive a survey about your Emergency Department experience today, please fill it out.  We value your feedback. Thank you.      Kimberli Merida PA-C    ___________________________________  I have included a copy of your lab results and/or radiologic studies from today's visit so you can have them easily available at your follow-up visit.   No results found for this or any previous visit (from the past 12 hours).    No orders to display     [unfilled]

## 2025-07-11 DIAGNOSIS — G62.9 SENSORY MOTOR NEUROPATHY: Primary | ICD-10-CM

## 2025-07-11 RX ORDER — GABAPENTIN 300 MG/1
CAPSULE ORAL
Qty: 90 CAPSULE | Refills: 1 | Status: SHIPPED | OUTPATIENT
Start: 2025-07-11 | End: 2025-10-09

## (undated) DEVICE — STOPCOCK IV 4 W TRNSPAR